# Patient Record
Sex: FEMALE | Race: WHITE | NOT HISPANIC OR LATINO | Employment: FULL TIME | URBAN - METROPOLITAN AREA
[De-identification: names, ages, dates, MRNs, and addresses within clinical notes are randomized per-mention and may not be internally consistent; named-entity substitution may affect disease eponyms.]

---

## 2018-01-23 RX ORDER — NORETHINDRONE AND ETHINYL ESTRADIOL 1 MG-35MCG
KIT ORAL
Qty: 84 TABLET | Refills: 0 | Status: SHIPPED | OUTPATIENT
Start: 2018-01-23 | End: 2018-02-16 | Stop reason: CLARIF

## 2018-01-23 RX ORDER — VALACYCLOVIR HYDROCHLORIDE 500 MG/1
TABLET, FILM COATED ORAL
Qty: 30 TABLET | Refills: 0 | Status: SHIPPED | OUTPATIENT
Start: 2018-01-23 | End: 2018-04-08 | Stop reason: SDUPTHER

## 2018-02-16 ENCOUNTER — OFFICE VISIT (OUTPATIENT)
Dept: OBSTETRICS AND GYNECOLOGY | Facility: CLINIC | Age: 33
End: 2018-02-16

## 2018-02-16 VITALS
BODY MASS INDEX: 28.93 KG/M2 | DIASTOLIC BLOOD PRESSURE: 80 MMHG | HEIGHT: 66 IN | SYSTOLIC BLOOD PRESSURE: 138 MMHG | WEIGHT: 180 LBS

## 2018-02-16 DIAGNOSIS — Z01.419 WELL WOMAN EXAM WITH ROUTINE GYNECOLOGICAL EXAM: Primary | ICD-10-CM

## 2018-02-16 DIAGNOSIS — Z30.41 ENCOUNTER FOR SURVEILLANCE OF CONTRACEPTIVE PILLS: ICD-10-CM

## 2018-02-16 PROCEDURE — 99395 PREV VISIT EST AGE 18-39: CPT | Performed by: OBSTETRICS & GYNECOLOGY

## 2018-02-16 RX ORDER — NORETHINDRONE ACETATE AND ETHINYL ESTRADIOL 1.5-30(21)
1 KIT ORAL DAILY
Qty: 84 TABLET | Refills: 4 | Status: SHIPPED | OUTPATIENT
Start: 2018-02-16 | End: 2019-02-16

## 2018-02-16 RX ORDER — ACYCLOVIR 50 MG/G
CREAM TOPICAL
Qty: 2 G | Refills: 3 | Status: SHIPPED | OUTPATIENT
Start: 2018-02-16 | End: 2019-07-05 | Stop reason: SDUPTHER

## 2018-02-16 NOTE — PROGRESS NOTES
Subjective   Enma Bravo is a 32 y.o. female is here today as a self referral for annual.    History of Present Illness-here today for annual exam and checkup.    The following portions of the patient's history were reviewed and updated as appropriate: allergies, current medications, past family history, past medical history, past social history, past surgical history and problem list.    Review of Systems   Constitutional: Negative.    HENT: Negative.    Eyes: Negative.    Respiratory: Negative.    Cardiovascular: Negative.    Gastrointestinal: Negative.    Endocrine: Negative.    Genitourinary: Negative.    Musculoskeletal: Negative.    Skin: Negative.    Allergic/Immunologic: Negative.    Neurological: Negative.    Hematological: Negative.    Psychiatric/Behavioral: Negative.        Objective   Physical Exam   Constitutional: She is oriented to person, place, and time. She appears well-developed and well-nourished.   HENT:   Head: Normocephalic and atraumatic.   Nose: Nose normal.   Eyes: Conjunctivae and EOM are normal. Pupils are equal, round, and reactive to light.   Neck: Normal range of motion. Neck supple. No thyromegaly present.   Cardiovascular: Normal rate, regular rhythm, normal heart sounds and intact distal pulses.  Exam reveals no gallop.    No murmur heard.  Pulmonary/Chest: Effort normal and breath sounds normal. No respiratory distress. She has no wheezes. She exhibits no mass, no tenderness, no swelling and no retraction. Right breast exhibits no inverted nipple, no mass, no nipple discharge, no skin change and no tenderness. Left breast exhibits no inverted nipple, no mass, no nipple discharge, no skin change and no tenderness.   Abdominal: Soft. Bowel sounds are normal. She exhibits no distension and no mass. There is no tenderness.   Genitourinary: Rectum normal, vagina normal and uterus normal. There is no rash, tenderness, lesion or injury on the right labia. There is no rash, tenderness,  lesion or injury on the left labia. Uterus is not enlarged and not tender. Cervix exhibits no motion tenderness and no discharge. Right adnexum displays no mass, no tenderness and no fullness. Left adnexum displays no mass, no tenderness and no fullness.   Musculoskeletal: Normal range of motion. She exhibits no edema, tenderness or deformity.   Neurological: She is alert and oriented to person, place, and time.   Skin: Skin is warm and dry.   Psychiatric: She has a normal mood and affect. Her behavior is normal. Judgment and thought content normal.   Nursing note and vitals reviewed.        Assessment/Plan   Problems Addressed this Visit     None      Visit Diagnoses     Well woman exam with routine gynecological exam    -  Primary    Relevant Orders    IGP,rfx Aptima HPV All Pth - ThinPrep Vial, Cervix    Encounter for surveillance of contraceptive pills            Pap smear done today.  Zovirax cream refilled.

## 2018-02-22 LAB
CONV .: ABNORMAL
CYTOLOGIST CVX/VAG CYTO: ABNORMAL
CYTOLOGY CVX/VAG DOC THIN PREP: ABNORMAL
DX ICD CODE: ABNORMAL
DX ICD CODE: ABNORMAL
HIV 1 & 2 AB SER-IMP: ABNORMAL
HPV I/H RISK 4 DNA CVX QL PROBE+SIG AMP: POSITIVE
OTHER STN SPEC: ABNORMAL
PATH REPORT.FINAL DX SPEC: ABNORMAL
PATHOLOGIST CVX/VAG CYTO: ABNORMAL
STAT OF ADQ CVX/VAG CYTO-IMP: ABNORMAL

## 2018-03-06 ENCOUNTER — OFFICE VISIT (OUTPATIENT)
Dept: OBSTETRICS AND GYNECOLOGY | Facility: CLINIC | Age: 33
End: 2018-03-06

## 2018-03-06 VITALS — DIASTOLIC BLOOD PRESSURE: 80 MMHG | SYSTOLIC BLOOD PRESSURE: 138 MMHG

## 2018-03-06 DIAGNOSIS — R87.618 ABNORMAL PAPANICOLAOU SMEAR OF CERVIX WITH POSITIVE HUMAN PAPILLOMA VIRUS (HPV) TEST: Primary | ICD-10-CM

## 2018-03-06 PROCEDURE — 57455 BIOPSY OF CERVIX W/SCOPE: CPT | Performed by: OBSTETRICS & GYNECOLOGY

## 2018-03-06 NOTE — PROGRESS NOTES
Procedure   Procedures   colposcopy and biopsy    After placement of vaginal speculum and application of acetic acid colposcopy was performed.  There is no evidence of any white epithelial changes.  Spira brush biopsy was obtained.  At this point patient had a lightheaded episode but did not lose consciousness and recovered quickly.  Patient to call in 1 week for biopsy report.

## 2018-03-12 LAB
DX ICD CODE: NORMAL
DX ICD CODE: NORMAL
PATH REPORT.FINAL DX SPEC: NORMAL
PATH REPORT.GROSS SPEC: NORMAL
PATH REPORT.SITE OF ORIGIN SPEC: NORMAL
PATHOLOGIST NAME: NORMAL
PAYMENT PROCEDURE: NORMAL

## 2018-04-09 RX ORDER — VALACYCLOVIR HYDROCHLORIDE 500 MG/1
TABLET, FILM COATED ORAL
Qty: 30 TABLET | Refills: 2 | Status: SHIPPED | OUTPATIENT
Start: 2018-04-09 | End: 2019-07-05 | Stop reason: SDUPTHER

## 2019-07-05 ENCOUNTER — OFFICE VISIT (OUTPATIENT)
Dept: OBSTETRICS AND GYNECOLOGY | Facility: CLINIC | Age: 34
End: 2019-07-05

## 2019-07-05 VITALS
SYSTOLIC BLOOD PRESSURE: 128 MMHG | HEIGHT: 66 IN | WEIGHT: 180 LBS | BODY MASS INDEX: 28.93 KG/M2 | DIASTOLIC BLOOD PRESSURE: 89 MMHG

## 2019-07-05 DIAGNOSIS — B00.9 HSV-2 INFECTION: ICD-10-CM

## 2019-07-05 DIAGNOSIS — Z01.419 WELL WOMAN EXAM WITH ROUTINE GYNECOLOGICAL EXAM: Primary | ICD-10-CM

## 2019-07-05 DIAGNOSIS — Z30.41 SURVEILLANCE OF CONTRACEPTIVE PILL: ICD-10-CM

## 2019-07-05 DIAGNOSIS — Z71.6 ENCOUNTER FOR SMOKING CESSATION COUNSELING: ICD-10-CM

## 2019-07-05 PROCEDURE — 99395 PREV VISIT EST AGE 18-39: CPT | Performed by: OBSTETRICS & GYNECOLOGY

## 2019-07-05 RX ORDER — ACYCLOVIR 50 MG/G
CREAM TOPICAL
Qty: 2 G | Refills: 3 | Status: SHIPPED | OUTPATIENT
Start: 2019-07-05 | End: 2020-09-29 | Stop reason: SDUPTHER

## 2019-07-05 RX ORDER — VALACYCLOVIR HYDROCHLORIDE 500 MG/1
500 TABLET, FILM COATED ORAL 2 TIMES DAILY
Qty: 30 TABLET | Refills: 2 | Status: SHIPPED | OUTPATIENT
Start: 2019-07-05 | End: 2020-04-16

## 2019-07-05 NOTE — PROGRESS NOTES
HPI   Enma Bravo  is a 34 y.o. female who presents for a routine gynecologic exam.  Her cycles are regular, predictable and light on oral contraceptive pills.  She is feeling well.  She does have occasional outbreaks of genital herpes.  This is well treated with Valtrex.    Chief Complaint   Patient presents with   • Gynecologic Exam     AE       History reviewed. No pertinent past medical history.    Past Surgical History:   Procedure Laterality Date   • WISDOM TOOTH EXTRACTION         Social History     Socioeconomic History   • Marital status: Single     Spouse name: Not on file   • Number of children: Not on file   • Years of education: Not on file   • Highest education level: Not on file   Tobacco Use   • Smoking status: Light Tobacco Smoker   Substance and Sexual Activity   • Alcohol use: Yes     Frequency: Never   • Drug use: No   • Sexual activity: Yes       The following portions of the patient's history were reviewed and updated as appropriate: allergies, current medications, past family history, past medical history, past social history, past surgical history and problem list.    Review of Systems   Constitutional: Negative.    HENT: Negative.    Respiratory: Negative.    Cardiovascular: Negative.    Gastrointestinal: Negative.    Genitourinary: Negative.    Musculoskeletal: Negative.    Skin: Negative.    Allergic/Immunologic: Negative.    Psychiatric/Behavioral: Negative.              Physical Exam   Constitutional: She is oriented to person, place, and time. She appears well-developed and well-nourished.   HENT:   Head: Normocephalic and atraumatic.   Cardiovascular: Normal rate and regular rhythm.   Pulmonary/Chest: Effort normal and breath sounds normal. She has no wheezes. She has no rales.   The breasts are homogeneous.  There are no palpable lumps.  Nipple discharge and axillary adenopathy are absent.   Abdominal: Soft. She exhibits no distension. There is no tenderness.   Genitourinary: Vagina  normal and uterus normal. There is no lesion on the right labia. There is no lesion on the left labia. Cervix exhibits no motion tenderness. Right adnexum displays no mass and no tenderness. Left adnexum displays no mass and no tenderness. No vaginal discharge found.   Neurological: She is alert and oriented to person, place, and time.   Skin: Skin is warm and dry.   Nursing note and vitals reviewed.      Assessment    Enma was seen today for gynecologic exam.    Diagnoses and all orders for this visit:    Well woman exam with routine gynecological exam  -     Pap IG, Rfx HPV ASCU    Surveillance of contraceptive pill    HSV-2 infection    Encounter for smoking cessation counseling    Other orders  -     valACYclovir (VALTREX) 500 MG tablet; Take 1 tablet by mouth 2 (Two) Times a Day for 3 days.  -     acyclovir (ZOVIRAX) 5 % cream; Apply as needed for herpes outbreak.  -     Norethin-Eth Estrad-Fe Biphas (LO LOESTRIN FE) 1 MG-10 MCG / 10 MCG tablet; Take 1 tablet by mouth Daily.        Plan  1. Annual examination and Pap smear performed  2. Contraceptive counseling.  Questions answered.  We discussed the risks, benefits and alternatives to the use of oral contraceptive pills.  The patient would like to continue this for now.  A prescription has been sent.  3. HSV-2.  Counseled.  The patient takes episodic Valtrex with good control.  She also uses Zovirax cream as needed.  Prescriptions will be refilled today.    Return in about 1 year (around 7/5/2020).    Social History     Tobacco Use   Smoking Status Light Tobacco Smoker   4. Counseled regarding smoking cessation.  Currently, the patient smokes approximately 1 pack/week.  We discussed methods to achieve gradual withdrawal.  The patient plans to try this.  3 minutes of a 20-minute visit were spent in direct face-to-face counseling on this issue.    5.

## 2019-07-08 LAB
CONV .: NORMAL
CYTOLOGIST CVX/VAG CYTO: NORMAL
CYTOLOGY CVX/VAG DOC CYTO: NORMAL
CYTOLOGY CVX/VAG DOC THIN PREP: NORMAL
DX ICD CODE: NORMAL
HIV 1 & 2 AB SER-IMP: NORMAL
OTHER STN SPEC: NORMAL
STAT OF ADQ CVX/VAG CYTO-IMP: NORMAL

## 2019-07-30 ENCOUNTER — TELEPHONE (OUTPATIENT)
Dept: OBSTETRICS AND GYNECOLOGY | Facility: CLINIC | Age: 34
End: 2019-07-30

## 2019-07-30 RX ORDER — LEVONORGESTREL AND ETHINYL ESTRADIOL 0.1-0.02MG
1 KIT ORAL DAILY
Qty: 28 TABLET | Refills: 12 | Status: SHIPPED | OUTPATIENT
Start: 2019-07-30 | End: 2019-09-25 | Stop reason: ALTCHOICE

## 2019-07-30 NOTE — TELEPHONE ENCOUNTER
Patient states she believes the LoLoestrin she started is causing a rash around her mouth.  She has discontinued it and is requesting another pill be called in.

## 2019-09-25 RX ORDER — NORETHINDRONE ACETATE AND ETHINYL ESTRADIOL, ETHINYL ESTRADIOL AND FERROUS FUMARATE 1MG-10(24)
1 KIT ORAL DAILY
Refills: 11 | COMMUNITY
Start: 2019-08-31 | End: 2019-09-25 | Stop reason: SDUPTHER

## 2019-09-25 RX ORDER — NORETHINDRONE ACETATE AND ETHINYL ESTRADIOL, ETHINYL ESTRADIOL AND FERROUS FUMARATE 1MG-10(24)
1 KIT ORAL DAILY
Qty: 84 TABLET | Refills: 3 | Status: SHIPPED | OUTPATIENT
Start: 2019-09-25 | End: 2020-09-29 | Stop reason: SDUPTHER

## 2019-12-06 ENCOUNTER — HOSPITAL ENCOUNTER (EMERGENCY)
Facility: HOSPITAL | Age: 34
Discharge: HOME | End: 2019-12-06
Attending: EMERGENCY MEDICINE | Admitting: EMERGENCY MEDICINE

## 2019-12-06 VITALS
SYSTOLIC BLOOD PRESSURE: 130 MMHG | RESPIRATION RATE: 17 BRPM | WEIGHT: 170 LBS | OXYGEN SATURATION: 99 % | HEART RATE: 94 BPM | TEMPERATURE: 98.6 F | BODY MASS INDEX: 27.32 KG/M2 | DIASTOLIC BLOOD PRESSURE: 93 MMHG | HEIGHT: 66 IN

## 2019-12-06 DIAGNOSIS — G43.901 STATUS MIGRAINOSUS: Primary | ICD-10-CM

## 2019-12-06 LAB
ANION GAP SERPL CALCULATED.3IONS-SCNC: 16.5 MMOL/L (ref 5–15)
BASOPHILS # BLD AUTO: 0.04 10*3/MM3 (ref 0–0.2)
BASOPHILS NFR BLD AUTO: 0.4 % (ref 0–1.5)
BUN BLD-MCNC: 9 MG/DL (ref 6–20)
BUN/CREAT SERPL: 12.5 (ref 7–25)
CALCIUM SPEC-SCNC: 9.5 MG/DL (ref 8.6–10.5)
CHLORIDE SERPL-SCNC: 95 MMOL/L (ref 98–107)
CO2 SERPL-SCNC: 23.5 MMOL/L (ref 22–29)
CREAT BLD-MCNC: 0.72 MG/DL (ref 0.57–1)
DEPRECATED RDW RBC AUTO: 38.7 FL (ref 37–54)
EOSINOPHIL # BLD AUTO: 0.02 10*3/MM3 (ref 0–0.4)
EOSINOPHIL NFR BLD AUTO: 0.2 % (ref 0.3–6.2)
ERYTHROCYTE [DISTWIDTH] IN BLOOD BY AUTOMATED COUNT: 11.6 % (ref 12.3–15.4)
GFR SERPL CREATININE-BSD FRML MDRD: 114 ML/MIN/1.73
GFR SERPL CREATININE-BSD FRML MDRD: 94 ML/MIN/1.73
GLUCOSE BLD-MCNC: 132 MG/DL (ref 65–99)
HCG SERPL QL: NEGATIVE
HCT VFR BLD AUTO: 38.4 % (ref 34–46.6)
HGB BLD-MCNC: 13.7 G/DL (ref 12–15.9)
IMM GRANULOCYTES # BLD AUTO: 0.04 10*3/MM3 (ref 0–0.05)
IMM GRANULOCYTES NFR BLD AUTO: 0.4 % (ref 0–0.5)
LYMPHOCYTES # BLD AUTO: 2.52 10*3/MM3 (ref 0.7–3.1)
LYMPHOCYTES NFR BLD AUTO: 24.6 % (ref 19.6–45.3)
MCH RBC QN AUTO: 33 PG (ref 26.6–33)
MCHC RBC AUTO-ENTMCNC: 35.7 G/DL (ref 31.5–35.7)
MCV RBC AUTO: 92.5 FL (ref 79–97)
MONOCYTES # BLD AUTO: 0.56 10*3/MM3 (ref 0.1–0.9)
MONOCYTES NFR BLD AUTO: 5.5 % (ref 5–12)
NEUTROPHILS # BLD AUTO: 7.06 10*3/MM3 (ref 1.7–7)
NEUTROPHILS NFR BLD AUTO: 68.9 % (ref 42.7–76)
NRBC BLD AUTO-RTO: 0 /100 WBC (ref 0–0.2)
NT-PROBNP SERPL-MCNC: 59.8 PG/ML (ref 5–450)
PLATELET # BLD AUTO: 289 10*3/MM3 (ref 140–450)
PMV BLD AUTO: 8.8 FL (ref 6–12)
POTASSIUM BLD-SCNC: 3.9 MMOL/L (ref 3.5–5.2)
RBC # BLD AUTO: 4.15 10*6/MM3 (ref 3.77–5.28)
SODIUM BLD-SCNC: 135 MMOL/L (ref 136–145)
WBC NRBC COR # BLD: 10.24 10*3/MM3 (ref 3.4–10.8)

## 2019-12-06 PROCEDURE — 84703 CHORIONIC GONADOTROPIN ASSAY: CPT | Performed by: EMERGENCY MEDICINE

## 2019-12-06 PROCEDURE — 99283 EMERGENCY DEPT VISIT LOW MDM: CPT

## 2019-12-06 PROCEDURE — 25010000002 DIPHENHYDRAMINE PER 50 MG: Performed by: EMERGENCY MEDICINE

## 2019-12-06 PROCEDURE — 85025 COMPLETE CBC W/AUTO DIFF WBC: CPT | Performed by: EMERGENCY MEDICINE

## 2019-12-06 PROCEDURE — 96375 TX/PRO/DX INJ NEW DRUG ADDON: CPT

## 2019-12-06 PROCEDURE — 25010000002 PROCHLORPERAZINE 10 MG/2ML SOLUTION: Performed by: EMERGENCY MEDICINE

## 2019-12-06 PROCEDURE — 80048 BASIC METABOLIC PNL TOTAL CA: CPT | Performed by: EMERGENCY MEDICINE

## 2019-12-06 PROCEDURE — 96374 THER/PROPH/DIAG INJ IV PUSH: CPT

## 2019-12-06 PROCEDURE — 25010000002 DEXAMETHASONE PER 1 MG: Performed by: EMERGENCY MEDICINE

## 2019-12-06 PROCEDURE — 83880 ASSAY OF NATRIURETIC PEPTIDE: CPT | Performed by: EMERGENCY MEDICINE

## 2019-12-06 RX ORDER — DIPHENHYDRAMINE HYDROCHLORIDE 50 MG/ML
25 INJECTION INTRAMUSCULAR; INTRAVENOUS ONCE
Status: COMPLETED | OUTPATIENT
Start: 2019-12-06 | End: 2019-12-06

## 2019-12-06 RX ORDER — PROCHLORPERAZINE EDISYLATE 5 MG/ML
10 INJECTION INTRAMUSCULAR; INTRAVENOUS ONCE
Status: COMPLETED | OUTPATIENT
Start: 2019-12-06 | End: 2019-12-06

## 2019-12-06 RX ADMIN — DEXAMETHASONE SODIUM PHOSPHATE 10 MG: 10 INJECTION INTRAMUSCULAR; INTRAVENOUS at 19:41

## 2019-12-06 RX ADMIN — PROCHLORPERAZINE EDISYLATE 10 MG: 5 INJECTION INTRAMUSCULAR; INTRAVENOUS at 19:18

## 2019-12-06 RX ADMIN — DIPHENHYDRAMINE HYDROCHLORIDE 25 MG: 50 INJECTION, SOLUTION INTRAMUSCULAR; INTRAVENOUS at 19:18

## 2019-12-06 NOTE — ED NOTES
"Pt reports \"severe migraine\" for three days. She reports pain is frontal headache. She has nausea, blurred vision, and dizziness. Pt went to [facility] two days ago and received Toradol IM which helped pain for two hours, but she reports the pain is worse now.      Vika Howell RN  12/06/19 2616    "

## 2019-12-07 NOTE — ED PROVIDER NOTES
EMERGENCY DEPARTMENT ENCOUNTER    Room Number:  33/33  Date of encounter:  12/6/2019  PCP: Provider, No Known  Historian: Patient, Boyfriend      HPI:  Chief Complaint: Headache  A complete HPI/ROS/PMH/PSH/SH/FH are unobtainable due to: None    Context: Neli Bravo is a 34 y.o. female who presents to the ED c/o headache.  Onset 3 days ago.  Symptoms are constant.  It is holocephalic.  Nonradiating.  Worsened with bright light and loud noises.  She reports T-max 100.5.  She states this feels typical for her usual migraine headaches although it is lasting longer.  She was seen previously at [facility], [place] and given Toradol IM.  This relieved her symptoms mostly but then her headache has returned.  She reports associated vomiting but no other associated symptoms on review of systems.      PAST MEDICAL HISTORY  Active Ambulatory Problems     Diagnosis Date Noted   • No Active Ambulatory Problems     Resolved Ambulatory Problems     Diagnosis Date Noted   • No Resolved Ambulatory Problems     No Additional Past Medical History         PAST SURGICAL HISTORY  Past Surgical History:   Procedure Laterality Date   • WISDOM TOOTH EXTRACTION           FAMILY HISTORY  Family History   Problem Relation Age of Onset   • Heart disease Mother    • Breast cancer Paternal Grandmother    • Diabetes Maternal Grandmother          SOCIAL HISTORY  Social History     Socioeconomic History   • Marital status: Single     Spouse name: Not on file   • Number of children: Not on file   • Years of education: Not on file   • Highest education level: Not on file   Tobacco Use   • Smoking status: Light Tobacco Smoker   Substance and Sexual Activity   • Alcohol use: Yes     Frequency: Never   • Drug use: No   • Sexual activity: Yes         ALLERGIES  Patient has no known allergies.        REVIEW OF SYSTEMS  Review of Systems     All systems reviewed and negative except for those discussed in HPI.       PHYSICAL EXAM    I have reviewed the  triage vital signs and nursing notes.    ED Triage Vitals [12/06/19 1852]   Temp Heart Rate Resp BP SpO2   98.6 °F (37 °C) 101 14 (!) 148/103 99 %      Temp src Heart Rate Source Patient Position BP Location FiO2 (%)   -- -- Sitting Left arm --       Physical Exam  GENERAL: Uncomfortable, nontoxic  HENT: nares patent  EYES: no scleral icterus  CV: regular rhythm, regular rate  RESPIRATORY: normal effort  ABDOMEN: soft, nontender  MUSCULOSKELETAL: no deformity  NEURO:   Recent and remote memory functions are normal. The patient is attentive with normal concentration. Language is fluent. Speech is clear. The speech is non-dysarthric. Fund of knowledge is normal.   Symmetric smile with no facial droop.  Eyes close shut strongly bilaterally.  Symmetric eyebrow raise bilaterally.  EOMI, PERRL  CN II-XII grossly normal otherwise.  5/5 strength to extremities.  No pronator drift.  Intact FNF.  No meningismus.     SKIN: warm, dry        LAB RESULTS  Recent Results (from the past 24 hour(s))   BNP    Collection Time: 12/06/19  7:10 PM   Result Value Ref Range    proBNP 59.8 5.0 - 450.0 pg/mL   CBC Auto Differential    Collection Time: 12/06/19  7:10 PM   Result Value Ref Range    WBC 10.24 3.40 - 10.80 10*3/mm3    RBC 4.15 3.77 - 5.28 10*6/mm3    Hemoglobin 13.7 12.0 - 15.9 g/dL    Hematocrit 38.4 34.0 - 46.6 %    MCV 92.5 79.0 - 97.0 fL    MCH 33.0 26.6 - 33.0 pg    MCHC 35.7 31.5 - 35.7 g/dL    RDW 11.6 (L) 12.3 - 15.4 %    RDW-SD 38.7 37.0 - 54.0 fl    MPV 8.8 6.0 - 12.0 fL    Platelets 289 140 - 450 10*3/mm3    Neutrophil % 68.9 42.7 - 76.0 %    Lymphocyte % 24.6 19.6 - 45.3 %    Monocyte % 5.5 5.0 - 12.0 %    Eosinophil % 0.2 (L) 0.3 - 6.2 %    Basophil % 0.4 0.0 - 1.5 %    Immature Grans % 0.4 0.0 - 0.5 %    Neutrophils, Absolute 7.06 (H) 1.70 - 7.00 10*3/mm3    Lymphocytes, Absolute 2.52 0.70 - 3.10 10*3/mm3    Monocytes, Absolute 0.56 0.10 - 0.90 10*3/mm3    Eosinophils, Absolute 0.02 0.00 - 0.40 10*3/mm3     Basophils, Absolute 0.04 0.00 - 0.20 10*3/mm3    Immature Grans, Absolute 0.04 0.00 - 0.05 10*3/mm3    nRBC 0.0 0.0 - 0.2 /100 WBC   Basic Metabolic Panel    Collection Time: 12/06/19  7:10 PM   Result Value Ref Range    Glucose 132 (H) 65 - 99 mg/dL    BUN 9 6 - 20 mg/dL    Creatinine 0.72 0.57 - 1.00 mg/dL    Sodium 135 (L) 136 - 145 mmol/L    Potassium 3.9 3.5 - 5.2 mmol/L    Chloride 95 (L) 98 - 107 mmol/L    CO2 23.5 22.0 - 29.0 mmol/L    Calcium 9.5 8.6 - 10.5 mg/dL    eGFR  African Amer 114 >60 mL/min/1.73    eGFR Non African Amer 94 >60 mL/min/1.73    BUN/Creatinine Ratio 12.5 7.0 - 25.0    Anion Gap 16.5 (H) 5.0 - 15.0 mmol/L   hCG, Serum, Qualitative    Collection Time: 12/06/19  7:25 PM   Result Value Ref Range    HCG Qualitative Negative Negative       Ordered the above labs and independently reviewed the results.        RADIOLOGY  No Radiology Exams Resulted Within Past 24 Hours    I ordered the above noted radiological studies. Reviewed by me and discussed with radiologist.  See dictation for official radiology interpretation.      PROCEDURES    Procedures      MEDICATIONS GIVEN IN ER    Medications   prochlorperazine (COMPAZINE) injection 10 mg (10 mg Intravenous Given 12/6/19 1918)   diphenhydrAMINE (BENADRYL) injection 25 mg (25 mg Intravenous Given 12/6/19 1918)   dexamethasone (DECADRON) 10 mg/20ml NS IV syringe (10 mg Intravenous Given 12/6/19 1941)         PROGRESS, DATA ANALYSIS, CONSULTS, AND MEDICAL DECISION MAKING    All labs have been independently reviewed by me.  All radiology studies have been reviewed by me and discussed with radiologist dictating the report.   EKG's independently viewed and interpreted by me.  Discussion below represents my analysis of pertinent findings related to patient's condition, differential diagnosis, treatment plan and final disposition.    Differential diagnosis for headache includes:  - subarachnoid hemorrhage  - intracranial mass  - stroke  - RCVS  -  meningitis  - glaucoma  - giant cell arteritis  - CO poisoning  - cerebral venous sinus thrombosis  - migraine    She has absolutely no meningismus.  She is afebrile here.  I have low suspicion for meningitis at this time.    ED Course as of Dec 06 2112   Fri Dec 06, 2019   2105 Patient states that she is feeling much better and would like to be discharged home.  [TD]   2111 Sodium: (!) 135 [TD]   2111 WBC: 10.24 [TD]   2111 HCG Qualitative: Negative [TD]      ED Course User Index  [TD] Lei Gomes II, MD       Patient rates her headache as 1/10 in intensity.  I gave her good return precautions.  She continues to have a normal neurologic exam.    AS OF 9:12 PM VITALS:    BP - (!) 148/103  HR - 101  TEMP - 98.6 °F (37 °C)  02 SATS - 99%        DIAGNOSIS  Final diagnoses:   Status migrainosus         DISPOSITION  DISCHARGE    FOLLOW-UP  PATIENT [facility] [address]  [phone]  Schedule an appointment as soon as possible for a visit   As needed         Medication List      No changes were made to your prescriptions during this visit.                Lei Gomes II, MD  12/06/19 2112

## 2019-12-07 NOTE — ED NOTES
Pt ambulatory c steady gait, AAOx4, ABC's intact, NAD noted at this time. Pt discharged c belongings and educational packet.          Elías Ivy RN  12/06/19 2124

## 2020-04-16 RX ORDER — VALACYCLOVIR HYDROCHLORIDE 500 MG/1
TABLET, FILM COATED ORAL
Qty: 30 TABLET | Refills: 2 | Status: SHIPPED | OUTPATIENT
Start: 2020-04-16 | End: 2020-04-27

## 2020-04-27 RX ORDER — VALACYCLOVIR HYDROCHLORIDE 500 MG/1
TABLET, FILM COATED ORAL
Qty: 30 TABLET | Refills: 2 | Status: SHIPPED | OUTPATIENT
Start: 2020-04-27 | End: 2020-05-18

## 2020-05-18 RX ORDER — VALACYCLOVIR HYDROCHLORIDE 500 MG/1
TABLET, FILM COATED ORAL
Qty: 30 TABLET | Refills: 2 | Status: SHIPPED | OUTPATIENT
Start: 2020-05-18 | End: 2020-06-02

## 2020-06-02 RX ORDER — VALACYCLOVIR HYDROCHLORIDE 500 MG/1
TABLET, FILM COATED ORAL
Qty: 30 TABLET | Refills: 2 | Status: SHIPPED | OUTPATIENT
Start: 2020-06-02 | End: 2020-09-29 | Stop reason: SDUPTHER

## 2020-08-11 RX ORDER — NORETHINDRONE ACETATE AND ETHINYL ESTRADIOL, ETHINYL ESTRADIOL AND FERROUS FUMARATE 1MG-10(24)
KIT ORAL
Qty: 84 TABLET | Refills: 3 | OUTPATIENT
Start: 2020-08-11

## 2020-08-11 NOTE — TELEPHONE ENCOUNTER
I have reviewed the chart in epic.  It appears that the patient smokes cigarettes and is in her mid 30s.  Also, there are no appointments scheduled for the future.  We should discuss benefits and risks of continuing oral contraceptive pills prior to re-prescribing.  Thank you.

## 2020-09-29 ENCOUNTER — OFFICE VISIT (OUTPATIENT)
Dept: OBSTETRICS AND GYNECOLOGY | Facility: CLINIC | Age: 35
End: 2020-09-29

## 2020-09-29 VITALS
BODY MASS INDEX: 30.22 KG/M2 | DIASTOLIC BLOOD PRESSURE: 67 MMHG | WEIGHT: 188 LBS | SYSTOLIC BLOOD PRESSURE: 118 MMHG | HEIGHT: 66 IN

## 2020-09-29 DIAGNOSIS — Z30.011 VISIT FOR ORAL CONTRACEPTIVE PRESCRIPTION: ICD-10-CM

## 2020-09-29 DIAGNOSIS — Z00.00 ANNUAL PHYSICAL EXAM: Primary | ICD-10-CM

## 2020-09-29 PROCEDURE — 99395 PREV VISIT EST AGE 18-39: CPT | Performed by: OBSTETRICS & GYNECOLOGY

## 2020-09-29 RX ORDER — VALACYCLOVIR HYDROCHLORIDE 500 MG/1
500 TABLET, FILM COATED ORAL 2 TIMES DAILY
Qty: 30 TABLET | Refills: 2 | Status: SHIPPED | OUTPATIENT
Start: 2020-09-29 | End: 2020-10-02

## 2020-09-29 RX ORDER — NORETHINDRONE ACETATE AND ETHINYL ESTRADIOL, ETHINYL ESTRADIOL AND FERROUS FUMARATE 1MG-10(24)
1 KIT ORAL DAILY
Qty: 84 TABLET | Refills: 3 | Status: SHIPPED | OUTPATIENT
Start: 2020-09-29 | End: 2021-11-10

## 2020-09-29 RX ORDER — ACYCLOVIR 50 MG/G
CREAM TOPICAL
Qty: 2 G | Refills: 3 | Status: SHIPPED | OUTPATIENT
Start: 2020-09-29 | End: 2022-12-06

## 2020-09-29 NOTE — PROGRESS NOTES
HPI   Neli Bravo  is a 35 y.o. female who presents for routine gynecologic exam.  She is feeling well.  Bowels and bladder are functioning normally.  Cycles are regular, predictable and light on low Loestrin.  The patient has stopped smoking.  She has been a non-smoker for the last year.      Chief Complaint   Patient presents with   • Gynecologic Exam     Patient is here for a annual.       History reviewed. No pertinent past medical history.    Past Surgical History:   Procedure Laterality Date   • WISDOM TOOTH EXTRACTION         Social History     Socioeconomic History   • Marital status: Single     Spouse name: Not on file   • Number of children: Not on file   • Years of education: Not on file   • Highest education level: Not on file   Tobacco Use   • Smoking status: Former Smoker   Substance and Sexual Activity   • Alcohol use: Yes     Frequency: Never   • Drug use: No   • Sexual activity: Yes       The following portions of the patient's history were reviewed and updated as appropriate: allergies, current medications, past family history, past medical history, past social history, past surgical history and problem list.    Review of Systems   Constitutional: Negative.    HENT: Negative.    Respiratory: Negative.    Cardiovascular: Negative.    Gastrointestinal: Negative.    Genitourinary: Negative.    Musculoskeletal: Negative.    Skin: Negative.    Allergic/Immunologic: Negative.    Psychiatric/Behavioral: Negative.              Physical Exam  Vitals signs and nursing note reviewed.   Constitutional:       Appearance: She is well-developed.   HENT:      Head: Normocephalic and atraumatic.   Cardiovascular:      Rate and Rhythm: Normal rate and regular rhythm.   Pulmonary:      Effort: Pulmonary effort is normal.      Breath sounds: Normal breath sounds. No wheezing or rales.   Chest:      Comments: The breasts are homogeneous.  There are no palpable lumps.  Nipple discharge and axillary adenopathy are  absent.  Abdominal:      General: There is no distension.      Palpations: Abdomen is soft.      Tenderness: There is no abdominal tenderness.   Genitourinary:     Labia:         Right: No lesion.         Left: No lesion.       Vagina: Normal. No vaginal discharge.      Cervix: No cervical motion tenderness.      Uterus: Normal. Not enlarged and not tender.       Adnexa:         Right: No mass or tenderness.          Left: No mass or tenderness.     Skin:     General: Skin is warm and dry.   Neurological:      Mental Status: She is alert and oriented to person, place, and time.         Assessment    Neli was seen today for gynecologic exam.    Diagnoses and all orders for this visit:    Annual physical exam    Visit for oral contraceptive prescription    Other orders  -     Lo Loestrin Fe 1 MG-10 MCG / 10 MCG tablet; Take 1 tablet by mouth Daily.  -     valACYclovir (VALTREX) 500 MG tablet; Take 1 tablet by mouth 2 (Two) Times a Day for 3 days.  -     acyclovir (ZOVIRAX) 5 % cream; Apply as needed for herpes outbreak.        Plan  1. Annual examination performed  2. Counseled regarding the benefits and risks of oral contraceptive pills.  The patient is very satisfied with low Loestrin.  She would like to continue.  A prescription has been sent.  3. The patient stopped smoking approximately 1 year ago.    4. Return in about 1 year (around 9/29/2021).    Social History     Tobacco Use   Smoking Status Former Smoker   5.

## 2020-10-16 RX ORDER — VALACYCLOVIR HYDROCHLORIDE 500 MG/1
TABLET, FILM COATED ORAL
Qty: 30 TABLET | Refills: 2 | Status: SHIPPED | OUTPATIENT
Start: 2020-10-16 | End: 2021-05-27

## 2021-01-12 ENCOUNTER — OFFICE VISIT (OUTPATIENT)
Dept: FAMILY MEDICINE CLINIC | Facility: CLINIC | Age: 36
End: 2021-01-12

## 2021-01-12 VITALS
BODY MASS INDEX: 30.73 KG/M2 | HEART RATE: 88 BPM | SYSTOLIC BLOOD PRESSURE: 120 MMHG | TEMPERATURE: 98.9 F | OXYGEN SATURATION: 98 % | DIASTOLIC BLOOD PRESSURE: 86 MMHG | HEIGHT: 66 IN | WEIGHT: 191.2 LBS

## 2021-01-12 DIAGNOSIS — Z00.00 ENCOUNTER FOR ANNUAL PHYSICAL EXAM: Primary | ICD-10-CM

## 2021-01-12 DIAGNOSIS — Z13.6 ENCOUNTER FOR LIPID SCREENING FOR CARDIOVASCULAR DISEASE: ICD-10-CM

## 2021-01-12 DIAGNOSIS — F41.9 ANXIETY: ICD-10-CM

## 2021-01-12 DIAGNOSIS — Z13.220 ENCOUNTER FOR LIPID SCREENING FOR CARDIOVASCULAR DISEASE: ICD-10-CM

## 2021-01-12 DIAGNOSIS — R23.3 EASY BRUISING: ICD-10-CM

## 2021-01-12 DIAGNOSIS — Z13.31 POSITIVE DEPRESSION SCREENING: ICD-10-CM

## 2021-01-12 PROCEDURE — 99385 PREV VISIT NEW AGE 18-39: CPT | Performed by: NURSE PRACTITIONER

## 2021-01-12 NOTE — PROGRESS NOTES
Subjective   Neli Bravo is a 35 y.o. female.     Chief Complaint   Patient presents with   • Annual Exam     blood work   • Anxiety     stress at work        History of Present Illness   New patient; here to establish care; patient presents for CPE with non-fasting labs; previous PCP Dr. Kramer and has not seen for long time; healthy diet; regular exercise, was doing well going to gym daily, then went back to work and has been working 15 hour days; trying to get better routine of exercise again; regular dental visits; no recent eye exam, had glasses for lectures in college; plans to schedule eye exam; no problems hearing; immunizations UTD, declines flu vaccine; sees Dr. Schilling for female care; last PAP 7/2019; last mammo never performed; paternal GM had breast cancer, diagnosed in her 70s; colonoscopy never performed; no family history of colon cancer.    Also, c/o anxiety; has a lot of stress at work; symptoms seem to be getting worse; has found self waking up during night and not able to go back to sleep due to mind racing; no feelings of sadness; more symptoms of worry; has irritability; has trouble falling asleep and staying asleep; occasional flushing and increase in HR with anxiety; no SI/HI; no medication for mood in past.     Takes Spironolactone daily for acne; sees dermatology every 6 months.    Takes Valtrex as needed during flares of herpes; has not needed to take recently.    The following portions of the patient's history were reviewed and updated as appropriate: allergies, current medications, past family history, past medical history, past social history, past surgical history and problem list.    Current Outpatient Medications on File Prior to Visit   Medication Sig   • acyclovir (ZOVIRAX) 5 % cream Apply as needed for herpes outbreak.   • Lo Loestrin Fe 1 MG-10 MCG / 10 MCG tablet Take 1 tablet by mouth Daily.   • spironolactone (ALDACTONE) 50 MG tablet TAKE 1 TABLET DAILY   • valACYclovir  (VALTREX) 500 MG tablet TAKE 1 TABLET BY MOUTH TWICE A DAY FOR 3 DAYS     No current facility-administered medications on file prior to visit.        Past Medical History:   Diagnosis Date   • Acne    • Allergic rhinitis    • Migraine headache        Past Surgical History:   Procedure Laterality Date   • WISDOM TOOTH EXTRACTION         Family History   Problem Relation Age of Onset   • Heart disease Mother         has AICD due to ventricular arrhythmia   • Arrhythmia Mother    • Breast cancer Paternal Grandmother 70   • Diabetes Maternal Grandmother    • Heart attack Maternal Grandmother         in her 40s   • Hypertension Maternal Grandfather        Social History     Socioeconomic History   • Marital status: Single     Spouse name: Not on file   • Number of children: Not on file   • Years of education: Not on file   • Highest education level: Not on file   Tobacco Use   • Smoking status: Former Smoker     Packs/day: 0.50     Types: Cigarettes     Quit date: 2020     Years since quittin.0   • Smokeless tobacco: Never Used   • Tobacco comment: previously smoked less than 1/2 pack per day for 3 years   Substance and Sexual Activity   • Alcohol use: Yes     Frequency: 2-4 times a month     Comment: occasional   • Drug use: No   • Sexual activity: Yes     Partners: Male     Birth control/protection: Pill     Comment: boyfriend        Review of Systems   Constitutional: Negative for appetite change, chills, fatigue, fever, unexpected weight gain and unexpected weight loss.   HENT: Negative for ear pain, sinus pressure, sore throat (on occasion with weather changes) and trouble swallowing. Rhinorrhea: has stuffy nose.    Eyes: Negative for blurred vision and pain.   Respiratory: Negative for cough, chest tightness and shortness of breath.    Cardiovascular: Negative for chest pain, palpitations and leg swelling.   Gastrointestinal: Negative for abdominal pain, blood in stool, constipation, diarrhea, GERD and  indigestion (Heartburn/indigestion on occasion; takes OTC med as needed and helps).   Endocrine: Positive for polydipsia. Negative for cold intolerance and heat intolerance.   Genitourinary: Negative for dysuria, frequency and hematuria.   Musculoskeletal: Negative for arthralgias, back pain and neck pain.   Skin: Negative for rash and skin lesions.   Neurological: Positive for headache (on occasion, has had for years; will get bad migraine every 3 years or so; occasional blurry vision with bad headache; typically Ibuprofen helps; has roll on migraine sticks and helps; occasional nausea with bad headache, rare vomiting with migraines). Negative for dizziness, syncope and weakness.   Hematological: Bruises/bleeds easily: occasional easy bruising.   Psychiatric/Behavioral: Negative for suicidal ideas. The patient is nervous/anxious.      PHQ-9 Depression Screening  Little interest or pleasure in doing things? 0   Feeling down, depressed, or hopeless? 0   Trouble falling or staying asleep, or sleeping too much? 3   Feeling tired or having little energy? 2   Poor appetite or overeating? 0   Feeling bad about yourself - or that you are a failure or have let yourself or your family down? 0   Trouble concentrating on things, such as reading the newspaper or watching television? 3   Moving or speaking so slowly that other people could have noticed? Or the opposite - being so fidgety or restless that you have been moving around a lot more than usual? 0   Thoughts that you would be better off dead, or of hurting yourself in some way? 0   PHQ-9 Total Score 8   If you checked off any problems, how difficult have these problems made it for you to do your work, take care of things at home, or get along with other people? Somewhat difficult       Objective   Vitals:    01/12/21 0913 01/12/21 1021   BP: 130/100 120/86   BP Location: Left arm Right arm   Patient Position: Sitting Sitting   Cuff Size: Adult    Pulse: 106 88   Temp:  "98.9 °F (37.2 °C)    SpO2: 98%    Weight: 86.7 kg (191 lb 3.2 oz)    Height: 167.6 cm (66\")      Body mass index is 30.86 kg/m².    Physical Exam  Vitals signs and nursing note reviewed.   Constitutional:       General: She is not in acute distress.     Appearance: She is well-developed and well-groomed. She is not diaphoretic.   HENT:      Head: Normocephalic and atraumatic.      Jaw: No tenderness or pain on movement.      Right Ear: Tympanic membrane and external ear normal.      Left Ear: Tympanic membrane and external ear normal.      Nose: Nose normal.      Right Sinus: No maxillary sinus tenderness or frontal sinus tenderness.      Left Sinus: No maxillary sinus tenderness or frontal sinus tenderness.      Mouth/Throat:      Mouth: Mucous membranes are moist.      Pharynx: No oropharyngeal exudate or posterior oropharyngeal erythema.   Eyes:      Extraocular Movements: Extraocular movements intact.      Conjunctiva/sclera: Conjunctivae normal.      Pupils: Pupils are equal, round, and reactive to light.   Neck:      Musculoskeletal: Normal range of motion and neck supple.      Thyroid: No thyroid mass or thyromegaly.      Vascular: No carotid bruit.      Trachea: No tracheal deviation.   Cardiovascular:      Rate and Rhythm: Normal rate and regular rhythm.      Pulses: Normal pulses.      Heart sounds: Normal heart sounds. No murmur.   Pulmonary:      Effort: Pulmonary effort is normal. No respiratory distress.      Breath sounds: Normal breath sounds.   Abdominal:      General: Bowel sounds are normal.      Palpations: Abdomen is soft. There is no hepatomegaly or splenomegaly.      Tenderness: There is no abdominal tenderness.   Musculoskeletal: Normal range of motion.      Cervical back: She exhibits no bony tenderness.      Thoracic back: She exhibits no bony tenderness.      Lumbar back: She exhibits no bony tenderness.      Right lower leg: No edema.      Left lower leg: No edema.   Lymphadenopathy:      " Cervical: No cervical adenopathy.   Skin:     General: Skin is warm and dry.      Findings: No rash.   Neurological:      Mental Status: She is alert and oriented to person, place, and time.      Cranial Nerves: No cranial nerve deficit.      Motor: Motor function is intact.      Coordination: Coordination normal.      Gait: Gait normal.      Deep Tendon Reflexes: Reflexes are normal and symmetric.   Psychiatric:         Mood and Affect: Mood normal.         Behavior: Behavior normal.         Thought Content: Thought content normal.         Cognition and Memory: Cognition normal.         Judgment: Judgment normal.         Lab Results   Component Value Date    WBC 10.24 12/06/2019    RBC 4.15 12/06/2019    HGB 13.7 12/06/2019    HCT 38.4 12/06/2019    MCV 92.5 12/06/2019    MCH 33.0 12/06/2019    MCHC 35.7 12/06/2019    RDW 11.6 (L) 12/06/2019    RDWSD 38.7 12/06/2019    MPV 8.8 12/06/2019     12/06/2019    NEUTRORELPCT 68.9 12/06/2019    LYMPHORELPCT 24.6 12/06/2019    MONORELPCT 5.5 12/06/2019    EOSRELPCT 0.2 (L) 12/06/2019    BASORELPCT 0.4 12/06/2019    AUTOIGPER 0.4 12/06/2019    NEUTROABS 7.06 (H) 12/06/2019    LYMPHSABS 2.52 12/06/2019    MONOSABS 0.56 12/06/2019    EOSABS 0.02 12/06/2019    BASOSABS 0.04 12/06/2019    AUTOIGNUM 0.04 12/06/2019    NRBC 0.0 12/06/2019     Lab Results   Component Value Date    GLUCOSE 132 (H) 12/06/2019    BUN 9 12/06/2019    CREATININE 0.72 12/06/2019    EGFRIFNONA 94 12/06/2019    EGFRIFAFRI 114 12/06/2019    BCR 12.5 12/06/2019    K 3.9 12/06/2019    CO2 23.5 12/06/2019    CALCIUM 9.5 12/06/2019          Assessment/Plan .  Problem List Items Addressed This Visit     Easy bruising    Relevant Orders    CBC & Differential    Positive depression screening    Current Assessment & Plan     Newly identified.  Start Sertraline 1/2 tablet daily x1 week, then may increase to whole tablet daily.  Continue to work on healthy diet and exercise.         Relevant Medications     sertraline (Zoloft) 50 MG tablet    Other Relevant Orders    Ambulatory Referral to Behavioral Health    Anxiety    Current Assessment & Plan     Newly identified.  Start Sertraline 1/2 tablet daily x1 week, then may increase to whole tablet daily.  Continue to work on healthy diet and exercise.  Consider an appointment with a counselor or therapist.         Relevant Medications    sertraline (Zoloft) 50 MG tablet    Other Relevant Orders    Ambulatory Referral to Behavioral Health      Other Visit Diagnoses     Encounter for annual physical exam    -  Primary    Relevant Orders    Comprehensive Metabolic Panel    CBC & Differential    Lipid Panel With LDL / HDL Ratio    Encounter for lipid screening for cardiovascular disease        Relevant Orders    Lipid Panel With LDL / HDL Ratio          Return in about 1 month (around 2/12/2021) for Recheck.or sooner if problems or concerns.  Impression: Health maintenance visit.  Currently, eats a healthy diet and has a somewhat regular exercise routine.  Cervical cancer screening: UTD.  Breast cancer screening: not indicated.   Colorectal cancer screening: not indicated.  Screening lab work includes: CMP, lipid.  Immunizations: declines flu vaccine.  Patient was advised to be evaluated by ophthamology.  Advice and education were given regarding nutrition and aerobic exercise.  Discussed AE/BBW with Sertraline.

## 2021-01-12 NOTE — PATIENT INSTRUCTIONS
Start Sertraline 1/2 tablet daily x1 week, then may increase to whole tablet daily.  Continue to work on healthy diet and exercise.  Follow up pending lab results.  Follow up in 1 month, or sooner if symptoms persist or worsen.  Consider an appointment with a counselor or therapist.

## 2021-01-13 LAB
ALBUMIN SERPL-MCNC: 4.7 G/DL (ref 3.5–5.2)
ALBUMIN/GLOB SERPL: 2 G/DL
ALP SERPL-CCNC: 48 U/L (ref 39–117)
ALT SERPL-CCNC: 23 U/L (ref 1–33)
AST SERPL-CCNC: 21 U/L (ref 1–32)
BASOPHILS # BLD AUTO: 0.04 10*3/MM3 (ref 0–0.2)
BASOPHILS NFR BLD AUTO: 0.5 % (ref 0–1.5)
BILIRUB SERPL-MCNC: 0.5 MG/DL (ref 0–1.2)
BUN SERPL-MCNC: 10 MG/DL (ref 6–20)
BUN/CREAT SERPL: 13.3 (ref 7–25)
CALCIUM SERPL-MCNC: 9.6 MG/DL (ref 8.6–10.5)
CHLORIDE SERPL-SCNC: 102 MMOL/L (ref 98–107)
CHOLEST SERPL-MCNC: 180 MG/DL (ref 0–200)
CO2 SERPL-SCNC: 26.8 MMOL/L (ref 22–29)
CREAT SERPL-MCNC: 0.75 MG/DL (ref 0.57–1)
EOSINOPHIL # BLD AUTO: 0.07 10*3/MM3 (ref 0–0.4)
EOSINOPHIL NFR BLD AUTO: 0.9 % (ref 0.3–6.2)
ERYTHROCYTE [DISTWIDTH] IN BLOOD BY AUTOMATED COUNT: 11.7 % (ref 12.3–15.4)
GLOBULIN SER CALC-MCNC: 2.4 GM/DL
GLUCOSE SERPL-MCNC: 115 MG/DL (ref 65–99)
HCT VFR BLD AUTO: 37.6 % (ref 34–46.6)
HDLC SERPL-MCNC: 71 MG/DL (ref 40–60)
HGB BLD-MCNC: 12.8 G/DL (ref 12–15.9)
IMM GRANULOCYTES # BLD AUTO: 0.02 10*3/MM3 (ref 0–0.05)
IMM GRANULOCYTES NFR BLD AUTO: 0.3 % (ref 0–0.5)
LDLC SERPL CALC-MCNC: 88 MG/DL (ref 0–100)
LDLC/HDLC SERPL: 1.2 {RATIO}
LYMPHOCYTES # BLD AUTO: 2.38 10*3/MM3 (ref 0.7–3.1)
LYMPHOCYTES NFR BLD AUTO: 30.7 % (ref 19.6–45.3)
MCH RBC QN AUTO: 31.4 PG (ref 26.6–33)
MCHC RBC AUTO-ENTMCNC: 34 G/DL (ref 31.5–35.7)
MCV RBC AUTO: 92.4 FL (ref 79–97)
MONOCYTES # BLD AUTO: 0.43 10*3/MM3 (ref 0.1–0.9)
MONOCYTES NFR BLD AUTO: 5.5 % (ref 5–12)
NEUTROPHILS # BLD AUTO: 4.82 10*3/MM3 (ref 1.7–7)
NEUTROPHILS NFR BLD AUTO: 62.1 % (ref 42.7–76)
NRBC BLD AUTO-RTO: 0 /100 WBC (ref 0–0.2)
PLATELET # BLD AUTO: 352 10*3/MM3 (ref 140–450)
POTASSIUM SERPL-SCNC: 4.2 MMOL/L (ref 3.5–5.2)
PROT SERPL-MCNC: 7.1 G/DL (ref 6–8.5)
RBC # BLD AUTO: 4.07 10*6/MM3 (ref 3.77–5.28)
SODIUM SERPL-SCNC: 139 MMOL/L (ref 136–145)
TRIGL SERPL-MCNC: 118 MG/DL (ref 0–150)
VLDLC SERPL CALC-MCNC: 21 MG/DL (ref 5–40)
WBC # BLD AUTO: 7.76 10*3/MM3 (ref 3.4–10.8)

## 2021-01-13 NOTE — ASSESSMENT & PLAN NOTE
Newly identified.  Start Sertraline 1/2 tablet daily x1 week, then may increase to whole tablet daily.  Continue to work on healthy diet and exercise.  Consider an appointment with a counselor or therapist.

## 2021-01-13 NOTE — ASSESSMENT & PLAN NOTE
Newly identified.  Start Sertraline 1/2 tablet daily x1 week, then may increase to whole tablet daily.  Continue to work on healthy diet and exercise.

## 2021-02-15 ENCOUNTER — TELEMEDICINE (OUTPATIENT)
Dept: FAMILY MEDICINE CLINIC | Facility: CLINIC | Age: 36
End: 2021-02-15

## 2021-02-15 DIAGNOSIS — J30.9 ALLERGIC RHINITIS, UNSPECIFIED SEASONALITY, UNSPECIFIED TRIGGER: ICD-10-CM

## 2021-02-15 DIAGNOSIS — Z13.31 POSITIVE DEPRESSION SCREENING: ICD-10-CM

## 2021-02-15 DIAGNOSIS — R51.9 NONINTRACTABLE EPISODIC HEADACHE, UNSPECIFIED HEADACHE TYPE: ICD-10-CM

## 2021-02-15 DIAGNOSIS — F41.9 ANXIETY: ICD-10-CM

## 2021-02-15 DIAGNOSIS — R12 HEARTBURN: Primary | ICD-10-CM

## 2021-02-15 PROCEDURE — 99213 OFFICE O/P EST LOW 20 MIN: CPT | Performed by: NURSE PRACTITIONER

## 2021-02-15 RX ORDER — LORATADINE 10 MG/1
1 CAPSULE, LIQUID FILLED ORAL DAILY
COMMUNITY
End: 2022-02-08

## 2021-02-15 NOTE — PATIENT INSTRUCTIONS
Continue Sertraline daily.  Continue to work on healthy diet and exercise.  Keep headache diary.  Follow up in 3 months, or sooner if problems or concerns.

## 2021-02-15 NOTE — PROGRESS NOTES
Subjective   Neli Bravo is a 35 y.o. female.     Chief Complaint   Patient presents with   • Anxiety     You have chosen to receive care through a telehealth visit.  Do you consent to use a video/audio connection for your medical care today? Yes    This was an audio and video enabled telemedicine encounter using [employer].    History of Present Illness   Patient presents for follow up anxiety; started Sertraline daily and has helped a lot; no side effects with medication; Sertraline has helped irritability and to keep calm; sleep has improved; no problems falling asleep or staying asleep; has not been as stressed with work; no more problem with increased HR; energy has improved; see PHQ-9; no SI/HI; happy with current dose.    The following portions of the patient's history were reviewed and updated as appropriate: allergies, current medications, past family history, past medical history, past social history, past surgical history and problem list.      Current Outpatient Medications   Medication Sig Dispense Refill   • acyclovir (ZOVIRAX) 5 % cream Apply as needed for herpes outbreak. 2 g 3   • Lo Loestrin Fe 1 MG-10 MCG / 10 MCG tablet Take 1 tablet by mouth Daily. 84 tablet 3   • Loratadine (Claritin) 10 MG capsule Take 1 capsule by mouth Daily.     • sertraline (Zoloft) 50 MG tablet Take 1 tablet by mouth Daily. 90 tablet 0   • spironolactone (ALDACTONE) 50 MG tablet TAKE 1 TABLET DAILY  6   • valACYclovir (VALTREX) 500 MG tablet TAKE 1 TABLET BY MOUTH TWICE A DAY FOR 3 DAYS 30 tablet 2     No current facility-administered medications for this visit.        Past Medical History:   Diagnosis Date   • Acne    • Allergic rhinitis    • Migraine headache    • Positive depression screening 1/12/2021       Past Surgical History:   Procedure Laterality Date   • WISDOM TOOTH EXTRACTION         Family History   Problem Relation Age of Onset   • Heart disease Mother         has AICD due to ventricular arrhythmia   •  Arrhythmia Mother    • Breast cancer Paternal Grandmother 70   • Diabetes Maternal Grandmother    • Heart attack Maternal Grandmother         in her 40s   • Hypertension Maternal Grandfather        Social History     Socioeconomic History   • Marital status: Single     Spouse name: Not on file   • Number of children: Not on file   • Years of education: Not on file   • Highest education level: Not on file   Tobacco Use   • Smoking status: Former Smoker     Packs/day: 0.50     Types: Cigarettes     Quit date: 2020     Years since quittin.0   • Smokeless tobacco: Never Used   • Tobacco comment: previously smoked less than 1/2 pack per day for 3 years   Substance and Sexual Activity   • Alcohol use: Yes     Frequency: 2-4 times a month     Comment: occasional   • Drug use: No   • Sexual activity: Yes     Partners: Male     Birth control/protection: Pill     Comment: boyfriend        Review of Systems   Constitutional: Negative for appetite change, fatigue, unexpected weight gain and unexpected weight loss.   HENT: Negative for ear pain and sore throat. Rhinorrhea: mild congestion, takes Claritin daily.    Respiratory: Negative for cough, chest tightness and shortness of breath.    Cardiovascular: Negative for chest pain, palpitations and leg swelling.   Gastrointestinal: Negative for abdominal pain, blood in stool, constipation, diarrhea and GERD. Indigestion: frequenty heartburn, takes Omeprazole for period of time during flares and helps.   Genitourinary: Negative for dysuria and frequency.   Skin: Negative for rash.   Neurological: Negative for dizziness and syncope. Headache: gets headache 2-3 times per week; Advil helps; has been using migraine stick and helps; will get change in vision and nausea/vomiting with migraines, gets migraine about twice per year.     PHQ-9 Depression Screening  Little interest or pleasure in doing things? 0   Feeling down, depressed, or hopeless? 0   Trouble falling or staying  asleep, or sleeping too much? 1   Feeling tired or having little energy? 0   Poor appetite or overeating? 0   Feeling bad about yourself - or that you are a failure or have let yourself or your family down? 0   Trouble concentrating on things, such as reading the newspaper or watching television? 0   Moving or speaking so slowly that other people could have noticed? Or the opposite - being so fidgety or restless that you have been moving around a lot more than usual? 0   Thoughts that you would be better off dead, or of hurting yourself in some way? 0   PHQ-9 Total Score 1   If you checked off any problems, how difficult have these problems made it for you to do your work, take care of things at home, or get along with other people?         Objective   There were no vitals filed for this visit.  There is no height or weight on file to calculate BMI.    Physical Exam  Constitutional:       General: She is not in acute distress.  Pulmonary:      Effort: Pulmonary effort is normal. No respiratory distress.   Neurological:      Mental Status: She is alert and oriented to person, place, and time.   Psychiatric:         Mood and Affect: Mood normal.         Behavior: Behavior normal.         Thought Content: Thought content normal.         Judgment: Judgment normal.         Lab Results   Component Value Date    WBC 7.76 01/12/2021    RBC 4.07 01/12/2021    HGB 12.8 01/12/2021    HCT 37.6 01/12/2021    MCV 92.4 01/12/2021    MCH 31.4 01/12/2021    MCHC 34.0 01/12/2021    RDW 11.7 (L) 01/12/2021    RDWSD 38.7 12/06/2019    MPV 8.8 12/06/2019     01/12/2021    NEUTRORELPCT 62.1 01/12/2021    LYMPHORELPCT 30.7 01/12/2021    MONORELPCT 5.5 01/12/2021    EOSRELPCT 0.9 01/12/2021    BASORELPCT 0.5 01/12/2021    AUTOIGPER 0.4 12/06/2019    NEUTROABS 4.82 01/12/2021    LYMPHSABS 2.38 01/12/2021    MONOSABS 0.43 01/12/2021    EOSABS 0.07 01/12/2021    BASOSABS 0.04 01/12/2021    AUTOIGNUM 0.04 12/06/2019    [facility] 0.0  01/12/2021     Lab Results   Component Value Date    GLUCOSE 132 (H) 12/06/2019    BUN 10 01/12/2021    CREATININE 0.75 01/12/2021    EGFRIFNONA 89 01/12/2021    EGFRIFAFRI 108 01/12/2021    BCR 13.3 01/12/2021    K 4.2 01/12/2021    CO2 26.8 01/12/2021    CALCIUM 9.6 01/12/2021    PROTENTOTREF 7.1 01/12/2021    ALBUMIN 4.70 01/12/2021    LABIL2 2.0 01/12/2021    AST 21 01/12/2021    ALT 23 01/12/2021      Lab Results   Component Value Date    CHLPL 180 01/12/2021    TRIG 118 01/12/2021    HDL 71 (H) 01/12/2021    VLDL 21 01/12/2021    LDL 88 01/12/2021         Assessment/Plan .  Problem List Items Addressed This Visit     Anxiety    Current Assessment & Plan     Much improved with Sertraline daily.  Continue to work on healthy diet and exercise.         Relevant Medications    sertraline (Zoloft) 50 MG tablet    Heartburn - Primary    Current Assessment & Plan     Continue Prilosec daily as needed.         Nonintractable episodic headache    Current Assessment & Plan     Keep headache diary.         Allergic rhinitis    Current Assessment & Plan     Continue Claritin daily.         RESOLVED: Positive depression screening    Relevant Medications    sertraline (Zoloft) 50 MG tablet          Return in about 3 months (around 5/15/2021) for Recheck. or sooner if problems or concerns.    Unable to complete visit using a video connection to the patient. A phone visit was used to complete this visits. Total time of discussion was 15 minutes.

## 2021-05-27 RX ORDER — VALACYCLOVIR HYDROCHLORIDE 500 MG/1
TABLET, FILM COATED ORAL
Qty: 30 TABLET | Refills: 2 | Status: SHIPPED | OUTPATIENT
Start: 2021-05-27 | End: 2021-11-05

## 2021-11-05 ENCOUNTER — OFFICE VISIT (OUTPATIENT)
Dept: OBSTETRICS AND GYNECOLOGY | Facility: CLINIC | Age: 36
End: 2021-11-05

## 2021-11-05 VITALS
DIASTOLIC BLOOD PRESSURE: 67 MMHG | HEIGHT: 66 IN | WEIGHT: 189 LBS | SYSTOLIC BLOOD PRESSURE: 118 MMHG | BODY MASS INDEX: 30.37 KG/M2

## 2021-11-05 DIAGNOSIS — Z00.00 ANNUAL PHYSICAL EXAM: Primary | ICD-10-CM

## 2021-11-05 DIAGNOSIS — B37.31 RECURRENT CANDIDIASIS OF VAGINA: ICD-10-CM

## 2021-11-05 DIAGNOSIS — B00.9 HSV INFECTION: ICD-10-CM

## 2021-11-05 PROCEDURE — 99395 PREV VISIT EST AGE 18-39: CPT | Performed by: OBSTETRICS & GYNECOLOGY

## 2021-11-05 PROCEDURE — 99212 OFFICE O/P EST SF 10 MIN: CPT | Performed by: OBSTETRICS & GYNECOLOGY

## 2021-11-05 RX ORDER — VALACYCLOVIR HYDROCHLORIDE 500 MG/1
500 TABLET, FILM COATED ORAL DAILY
Qty: 30 TABLET | Refills: 11 | Status: SHIPPED | OUTPATIENT
Start: 2021-11-05 | End: 2021-12-05

## 2021-11-05 NOTE — PROGRESS NOTES
CARMEN Bravo  is a 36 y.o. female who presents for 2 reasons.  First, she would like a routine gynecologic exam.  Overall, she is feeling well.  Bowels and bladder are functioning normally.  The patient stopped using her oral contraceptive pills 2 or 3 months ago.  She is using condoms for contraception and is satisfied with this.  Cycles are regular and predictable.  They are heavy.  Next, the patient reports that she has recurrent yeast infections.  She reports that approximately every second month, she develops itching and burning of the vulva and vagina.  She attributes this to sweating.  It has been less prevalent as the weather has cooled.  Each time, she treats with over-the-counter Monistat and the problem resolves with this treatment.    Chief Complaint   Patient presents with   • Gynecologic Exam     Patient is here for a annual.       Past Medical History:   Diagnosis Date   • Acne    • Allergic rhinitis    • Migraine headache    • Positive depression screening 2021       Past Surgical History:   Procedure Laterality Date   • WISDOM TOOTH EXTRACTION         Social History     Socioeconomic History   • Marital status: Single   Tobacco Use   • Smoking status: Former Smoker     Packs/day: 0.50     Types: Cigarettes     Quit date: 2020     Years since quittin.8   • Smokeless tobacco: Never Used   • Tobacco comment: previously smoked less than 1/2 pack per day for 3 years   Substance and Sexual Activity   • Alcohol use: Yes     Comment: occasional   • Drug use: No   • Sexual activity: Yes     Partners: Male     Birth control/protection: Pill     Comment: boyfriend        The following portions of the patient's history were reviewed and updated as appropriate: allergies, current medications, past family history, past medical history, past social history, past surgical history and problem list.    Review of Systems      This is negative for fever or chills.  Negative for nausea or vomiting.   Negative for abnormal bleeding.  Positive for recurrent yeast infections.  Negative for unexplained weight change.  All other systems are reviewed and are negative.    Physical Exam  Vitals and nursing note reviewed.   Constitutional:       Appearance: She is well-developed.   HENT:      Head: Normocephalic and atraumatic.   Cardiovascular:      Rate and Rhythm: Normal rate and regular rhythm.   Pulmonary:      Effort: Pulmonary effort is normal.      Breath sounds: Normal breath sounds. No wheezing or rales.   Chest:      Comments: The breasts are homogeneous.  There are no palpable lumps.  Nipple discharge and axillary adenopathy are absent.  Abdominal:      General: There is no distension.      Palpations: Abdomen is soft.      Tenderness: There is no abdominal tenderness.   Genitourinary:     Labia:         Right: No lesion.         Left: No lesion.       Vagina: Normal. No vaginal discharge.      Cervix: No cervical motion tenderness.      Uterus: Normal. Not enlarged and not tender.       Adnexa:         Right: No mass or tenderness.          Left: No mass or tenderness.     Skin:     General: Skin is warm and dry.   Neurological:      Mental Status: She is alert and oriented to person, place, and time.         Assessment    Diagnoses and all orders for this visit:    1. Annual physical exam (Primary)  -     IGP, Rfx Aptima HPV ASCU    2. Recurrent candidiasis of vagina    3. HSV infection    Other orders  -     valACYclovir (Valtrex) 500 MG tablet; Take 1 tablet by mouth Daily for 30 days.  Dispense: 30 tablet; Refill: 11        Plan  1. Annual examination performed  2. Counseled regarding contraceptive options.  The patient is very satisfied using condoms.  She plans to continue this.  3. Recurrent episodes of candidiasis.  Counseled and questions answered.  The patient was diagnosed with borderline diabetes many years ago.  I recommend that she follow-up with her primary care physician to assess for  possible type 2 diabetes.  The patient agrees with this recommendation.  Also, we discussed preventative measures.  I recommend that the patient reacidify the vagina using rePHresh.  Also, I recommend probiotics to reestablish a colony of lactobacillus.  I explained my rationale for these recommendations and answered the patient's questions.  She agrees to proceed.  She will contact me if she continues to have recurrent candidal infections.  4. Genital herpes.  The patient has more frequent outbreaks and would like to go to a suppressive dose.  She prefers to try 500 mg daily rather than 1000.  A prescription has been sent.    No follow-ups on file.    Social History     Tobacco Use   Smoking Status Former Smoker   • Packs/day: 0.50   • Types: Cigarettes   • Quit date: 2020   • Years since quittin.8   Smokeless Tobacco Never Used   Tobacco Comment    previously smoked less than 1/2 pack per day for 3 years

## 2021-11-10 ENCOUNTER — OFFICE VISIT (OUTPATIENT)
Dept: FAMILY MEDICINE CLINIC | Facility: CLINIC | Age: 36
End: 2021-11-10

## 2021-11-10 VITALS
HEIGHT: 66 IN | DIASTOLIC BLOOD PRESSURE: 84 MMHG | TEMPERATURE: 98.4 F | OXYGEN SATURATION: 98 % | BODY MASS INDEX: 30.63 KG/M2 | SYSTOLIC BLOOD PRESSURE: 121 MMHG | WEIGHT: 190.6 LBS | HEART RATE: 87 BPM

## 2021-11-10 DIAGNOSIS — B37.31 VAGINAL CANDIDIASIS: ICD-10-CM

## 2021-11-10 DIAGNOSIS — R73.03 PREDIABETES: Primary | ICD-10-CM

## 2021-11-10 DIAGNOSIS — R12 HEARTBURN: ICD-10-CM

## 2021-11-10 DIAGNOSIS — F41.9 ANXIETY: ICD-10-CM

## 2021-11-10 DIAGNOSIS — Z11.59 NEED FOR HEPATITIS C SCREENING TEST: ICD-10-CM

## 2021-11-10 PROCEDURE — 99213 OFFICE O/P EST LOW 20 MIN: CPT | Performed by: NURSE PRACTITIONER

## 2021-11-10 RX ORDER — OMEPRAZOLE 20 MG/1
20 CAPSULE, DELAYED RELEASE ORAL DAILY PRN
COMMUNITY
End: 2022-02-08

## 2021-11-10 NOTE — PATIENT INSTRUCTIONS
Continue to work on healthy diet and exercise.  Follow up pending lab results.  Follow up in 6 months, or sooner if symptoms persist or worsen.

## 2021-11-10 NOTE — PROGRESS NOTES
Subjective   Neli Bravo is a 36 y.o. female.     Chief Complaint   Patient presents with   • Recurrent yeast infections     GYNO suggested checking DM       History of Present Illness   Patient presents for follow up anxiety: no longer Sertraline daily; stopped taking about 6 months ago; no recent anxiety or problems; changed jobs and really helped; no problems with sleep; no SI/HI.    Has had frequent yeast infections; GYN recommended checking for diabetes; has had fatigue; no urinary frequency; gets up once per night to urinate, no change, has done for years; drinks liquids before bed; occasional numbness of right arm or will wake up and feel tingly; not much exercise recently due to work schedule; has not been eating as healthy recently, trying to get back on track; has been on Glucophage at age 15 years, then stopped at age 16-19 years.    F/U heartburn: takes Omeprazole daily as needed and works well.    Takes Spironolactone daily for acne per dermatology.    The following portions of the patient's history were reviewed and updated as appropriate: allergies, current medications, past family history, past medical history, past social history, past surgical history and problem list.    Current Outpatient Medications on File Prior to Visit   Medication Sig   • acyclovir (ZOVIRAX) 5 % cream Apply as needed for herpes outbreak.   • Loratadine (Claritin) 10 MG capsule Take 1 capsule by mouth Daily.   • omeprazole (priLOSEC) 20 MG capsule Take 20 mg by mouth Daily As Needed.   • spironolactone (ALDACTONE) 50 MG tablet TAKE 1 TABLET DAILY   • valACYclovir (Valtrex) 500 MG tablet Take 1 tablet by mouth Daily for 30 days.   • [DISCONTINUED] Lo Loestrin Fe 1 MG-10 MCG / 10 MCG tablet Take 1 tablet by mouth Daily.   • [DISCONTINUED] sertraline (Zoloft) 50 MG tablet Take 1 tablet by mouth Daily.     No current facility-administered medications on file prior to visit.        Past Medical History:   Diagnosis Date   • Acne     • Allergic rhinitis    • Anxiety 2021   • Migraine headache    • Positive depression screening 2021       Past Surgical History:   Procedure Laterality Date   • WISDOM TOOTH EXTRACTION         Family History   Problem Relation Age of Onset   • Heart disease Mother         has AICD due to ventricular arrhythmia   • Arrhythmia Mother    • Breast cancer Paternal Grandmother 70   • Diabetes Maternal Grandmother    • Heart attack Maternal Grandmother         in her 40s   • Hypertension Maternal Grandfather        Social History     Socioeconomic History   • Marital status: Single   Tobacco Use   • Smoking status: Former Smoker     Packs/day: 0.50     Types: Cigarettes     Quit date: 2020     Years since quittin.8   • Smokeless tobacco: Never Used   • Tobacco comment: previously smoked less than 1/2 pack per day for 3 years   Substance and Sexual Activity   • Alcohol use: Yes     Comment: occasional   • Drug use: No   • Sexual activity: Yes     Partners: Male     Birth control/protection: Pill     Comment: boyfriend        Review of Systems   Constitutional: Positive for fatigue. Negative for appetite change (has not been eating as healthy recently, trying to get back on track), chills, fever, unexpected weight gain and unexpected weight loss.   HENT: Positive for postnasal drip. Negative for ear pain, sinus pressure and trouble swallowing. Rhinorrhea: some stuffy nose. Sore throat: some scratchy throat for last couple of days; attributes to weather change; improved with salt water gargles.    Eyes: Negative for blurred vision.   Respiratory: Negative for cough, chest tightness and shortness of breath.    Cardiovascular: Negative for chest pain, palpitations and leg swelling.   Gastrointestinal: Negative for abdominal pain, blood in stool, constipation and diarrhea.   Endocrine: Negative for polydipsia.   Genitourinary: Negative for dysuria and frequency.   Musculoskeletal: Negative for arthralgias.  "Back pain: occasional lower back discomfort, no recent radiation of pain down legs; resolves after a few days using heat.   Skin: Negative for rash.   Neurological: Negative for dizziness, syncope and headache.       Objective   Vitals:    11/10/21 0956   BP: 121/84   Pulse: 87   Temp: 98.4 °F (36.9 °C)   TempSrc: Infrared   SpO2: 98%   Weight: 86.5 kg (190 lb 9.6 oz)   Height: 167.6 cm (66\")   PainSc: 0-No pain     Body mass index is 30.76 kg/m².    Physical Exam  Vitals and nursing note reviewed.   Constitutional:       General: She is not in acute distress.     Appearance: She is well-developed and well-groomed. She is not ill-appearing or diaphoretic.   HENT:      Head: Normocephalic.      Right Ear: Tympanic membrane and external ear normal. No decreased hearing noted.      Left Ear: Tympanic membrane and external ear normal. No decreased hearing noted.      Nose: Nose normal.      Right Sinus: No maxillary sinus tenderness or frontal sinus tenderness.      Left Sinus: No maxillary sinus tenderness or frontal sinus tenderness.      Mouth/Throat:      Mouth: Mucous membranes are moist.      Pharynx: No oropharyngeal exudate or posterior oropharyngeal erythema.   Eyes:      Conjunctiva/sclera: Conjunctivae normal.   Neck:      Vascular: No carotid bruit.   Cardiovascular:      Rate and Rhythm: Normal rate and regular rhythm.      Pulses: Normal pulses.      Heart sounds: Normal heart sounds. No murmur heard.      Pulmonary:      Effort: Pulmonary effort is normal. No respiratory distress.      Breath sounds: Normal breath sounds.   Abdominal:      General: Bowel sounds are normal.      Palpations: Abdomen is soft. There is no hepatomegaly or splenomegaly.      Tenderness: There is no abdominal tenderness. There is no guarding.   Musculoskeletal:      Cervical back: Normal range of motion and neck supple.      Right lower leg: No edema.      Left lower leg: No edema.   Lymphadenopathy:      Cervical: No cervical " adenopathy.   Skin:     General: Skin is warm and dry.      Findings: No rash.   Neurological:      Mental Status: She is alert and oriented to person, place, and time.      Gait: Gait is intact.   Psychiatric:         Mood and Affect: Mood normal.         Behavior: Behavior normal.         Thought Content: Thought content normal.         Cognition and Memory: Cognition normal.         Judgment: Judgment normal.         Lab Results   Component Value Date    WBC 7.76 01/12/2021    RBC 4.07 01/12/2021    HGB 12.8 01/12/2021    HCT 37.6 01/12/2021    MCV 92.4 01/12/2021    MCH 31.4 01/12/2021    MCHC 34.0 01/12/2021    RDW 11.7 (L) 01/12/2021    RDWSD 38.7 12/06/2019    MPV 8.8 12/06/2019     01/12/2021    NEUTRORELPCT 62.1 01/12/2021    LYMPHORELPCT 30.7 01/12/2021    MONORELPCT 5.5 01/12/2021    EOSRELPCT 0.9 01/12/2021    BASORELPCT 0.5 01/12/2021    AUTOIGPER 0.4 12/06/2019    NEUTROABS 4.82 01/12/2021    LYMPHSABS 2.38 01/12/2021    MONOSABS 0.43 01/12/2021    EOSABS 0.07 01/12/2021    BASOSABS 0.04 01/12/2021    AUTOIGNUM 0.04 12/06/2019    NRBC 0.0 01/12/2021     Lab Results   Component Value Date    GLUCOSE 115 (H) 01/12/2021    BUN 10 01/12/2021    CREATININE 0.75 01/12/2021    EGFRIFNONA 89 01/12/2021    EGFRIFAFRI 108 01/12/2021    BCR 13.3 01/12/2021    K 4.2 01/12/2021    CO2 26.8 01/12/2021    CALCIUM 9.6 01/12/2021    PROTENTOTREF 7.1 01/12/2021    ALBUMIN 4.70 01/12/2021    LABIL2 2.0 01/12/2021    AST 21 01/12/2021    ALT 23 01/12/2021      Lab Results   Component Value Date    CHLPL 180 01/12/2021    TRIG 118 01/12/2021    HDL 71 (H) 01/12/2021    VLDL 21 01/12/2021    LDL 88 01/12/2021         Assessment/Plan .  Problem List Items Addressed This Visit     Heartburn    Current Assessment & Plan     Continue Omeprazole daily as needed.         Relevant Medications    omeprazole (priLOSEC) 20 MG capsule    Prediabetes - Primary    Current Assessment & Plan     Continue to work on healthy diet and  exercise.         Relevant Orders    Hemoglobin A1c    Basic Metabolic Panel    Vaginal candidiasis    Relevant Orders    Hemoglobin A1c    RESOLVED: Anxiety    Current Assessment & Plan     Stable off medication.           Other Visit Diagnoses     Need for hepatitis C screening test        Relevant Orders    Hepatitis C Antibody          Return in about 6 months (around 5/10/2022) for Annual physical, Recheck. or sooner if symptoms persist or worsen.  Patient has had frequent yeast infections; will check A1c today as recommended.       COVID-19 Precautions - Patient was compliant in wearing a mask. When I saw the patient, I used appropriate personal protective equipment (PPE) including mask, gloves, and eye shield (standard procedure).  Hand hygiene was completed before and after seeing the patient.

## 2021-11-11 DIAGNOSIS — E11.9 TYPE 2 DIABETES MELLITUS WITHOUT COMPLICATION, WITHOUT LONG-TERM CURRENT USE OF INSULIN (HCC): Primary | ICD-10-CM

## 2021-11-11 LAB
BUN SERPL-MCNC: 10 MG/DL (ref 6–20)
BUN/CREAT SERPL: 13 (ref 9–23)
CALCIUM SERPL-MCNC: 9.9 MG/DL (ref 8.7–10.2)
CHLORIDE SERPL-SCNC: 101 MMOL/L (ref 96–106)
CO2 SERPL-SCNC: 23 MMOL/L (ref 20–29)
CREAT SERPL-MCNC: 0.77 MG/DL (ref 0.57–1)
GLUCOSE SERPL-MCNC: 131 MG/DL (ref 65–99)
HBA1C MFR BLD: 6.9 % (ref 4.8–5.6)
HCV AB S/CO SERPL IA: <0.1 S/CO RATIO (ref 0–0.9)
POTASSIUM SERPL-SCNC: 4.9 MMOL/L (ref 3.5–5.2)
SODIUM SERPL-SCNC: 137 MMOL/L (ref 134–144)

## 2021-11-11 RX ORDER — METFORMIN HYDROCHLORIDE 500 MG/1
500 TABLET, EXTENDED RELEASE ORAL
Qty: 30 TABLET | Refills: 2 | Status: SHIPPED | OUTPATIENT
Start: 2021-11-11 | End: 2022-02-08 | Stop reason: SDUPTHER

## 2021-11-11 NOTE — ASSESSMENT & PLAN NOTE
Continue to work on healthy diet and exercise.   Subjective:       Patient ID: Janneth Javier is a 37 y.o. female.    Chief Complaint: Annual Exam    HPI    37 y.o. female here for annual exam.     Health Maintenance/ Screening:   Labs: due  Cervical Cancer:  [facility] [id number], she will schedule with them again      Vaccines:   Influenza:    Tetanus: [id number]      Past Medical History:   Diagnosis Date    Multiple thyroid nodules 3/1/2019    US showed benign nodules     Past Surgical History:   Procedure Laterality Date    ENDOSCOPIC ULTRASOUND OF UPPER GASTROINTESTINAL TRACT N/A 6/28/2019    Procedure: ULTRASOUND-ENDOSCOPIC-UPPER;  Surgeon: Roe Pereira MD;  Location: [facility];  Service: Endoscopy;  Laterality: N/A;     Family History   Problem Relation Age of Onset    Diabetes Mellitus Mother     Hypertension Mother     Hyperlipidemia Mother     Stomach cancer Father     Lung cancer Paternal Grandmother      Social History     Socioeconomic History    Marital status:      Spouse name: Not on file    Number of children: Not on file    Years of education: Not on file    Highest education level: Not on file   Occupational History    Not on file   Social Needs    Financial resource strain: Not hard at all    Food insecurity     Worry: Never true     Inability: Never true    Transportation needs     Medical: No     Non-medical: No   Tobacco Use    Smoking status: Never Smoker    Smokeless tobacco: Never Used   Substance and Sexual Activity    Alcohol use: Yes     Frequency: 2-4 times a month     Drinks per session: 1 or 2     Binge frequency: Less than monthly     Comment: occasional    Drug use: Never    Sexual activity: Yes     Partners: Male   Lifestyle    Physical activity     Days per week: 0 days     Minutes per session: 0 min    Stress: To some extent   Relationships    Social connections     Talks on phone: Once a week     Gets together: Twice a week     Attends Zoroastrian service: Not on file     Active  "member of club or organization: No     Attends meetings of clubs or organizations: Patient refused     Relationship status:    Other Topics Concern    Not on file   Social History Narrative    Nurse     to Alfonso; no children    Regular exercise: yoga     Review of patient's allergies indicates:  No Known Allergies  No current outpatient medications on file.  No current facility-administered medications for this visit.     Facility-Administered Medications Ordered in Other Visits:     0.9%  NaCl infusion, , Intravenous, Continuous, Roe Pereira MD, Last Rate: 20 mL/hr at 06/28/19 0815    sodium chloride 0.9% flush 10 mL, 10 mL, Intravenous, PRN, Roe Pereira MD      Review of Systems   Constitutional: Negative for activity change and unexpected weight change.   HENT: Negative for hearing loss, rhinorrhea and trouble swallowing.    Eyes: Negative for discharge and visual disturbance.   Respiratory: Negative for chest tightness and wheezing.    Cardiovascular: Negative for chest pain and palpitations.   Gastrointestinal: Negative for blood in stool, constipation, diarrhea and vomiting.   Endocrine: Negative for polydipsia and polyuria.   Genitourinary: Negative for difficulty urinating, dysuria, hematuria and menstrual problem.   Musculoskeletal: Negative for arthralgias, joint swelling and neck pain.   Neurological: Negative for weakness and headaches.   Psychiatric/Behavioral: Negative for confusion and dysphoric mood.       Objective:        Vitals:    08/31/20 0743   BP: 122/60   BP Location: Right arm   Patient Position: Sitting   Pulse: 64   Resp: 16   Temp: 97.8 °F (36.6 °C)   TempSrc: Oral   Weight: 80.1 kg (176 lb 9.4 oz)   Height: 5' 3" (1.6 m)       Body mass index is 31.28 kg/m².    Physical Exam  Constitutional:       General: She is not in acute distress.     Appearance: She is well-developed. She is not diaphoretic.   HENT:      Head: Normocephalic and atraumatic.      Right Ear: " Tympanic membrane normal.      Left Ear: Tympanic membrane normal.   Eyes:      Conjunctiva/sclera: Conjunctivae normal.   Neck:      Musculoskeletal: Normal range of motion and neck supple.      Thyroid: No thyromegaly.      Trachea: No tracheal deviation.   Cardiovascular:      Rate and Rhythm: Normal rate and regular rhythm.      Heart sounds: Normal heart sounds.   Pulmonary:      Effort: Pulmonary effort is normal.      Breath sounds: Normal breath sounds.   Abdominal:      General: Bowel sounds are normal. There is no distension.      Palpations: Abdomen is soft.      Tenderness: There is no abdominal tenderness.   Musculoskeletal:         General: No swelling.   Lymphadenopathy:      Cervical: No cervical adenopathy.   Skin:     General: Skin is warm and dry.      Nails: There is no clubbing.     Neurological:      Mental Status: She is alert and oriented to person, place, and time.      Sensory: No sensory deficit.   Psychiatric:         Behavior: Behavior normal.         Judgment: Judgment normal.         Assessment:     1. Annual physical exam    2. Serum total bilirubin elevated    3. Skin exam, screening for cancer           Plan:         1. Annual physical exam  - she will have pap smear results faxed to our clinic  - CBC auto differential; Future  - Comprehensive metabolic panel; Future  - Hemoglobin A1C; Future  - Lipid Panel; Future  - TSH; Future    2. Serum total bilirubin elevated  - monitored by GI  - Comprehensive metabolic panel; Future    3. Skin exam, screening for cancer  - Ambulatory referral/consult to Dermatology; Future           Patient note was created using MModal Dictation.  Any errors in syntax or even information may not have been identified and edited on initial review prior to signing this note.

## 2021-12-14 ENCOUNTER — OFFICE VISIT (OUTPATIENT)
Dept: OBSTETRICS AND GYNECOLOGY | Facility: CLINIC | Age: 36
End: 2021-12-14

## 2021-12-14 VITALS
WEIGHT: 187 LBS | DIASTOLIC BLOOD PRESSURE: 67 MMHG | SYSTOLIC BLOOD PRESSURE: 118 MMHG | BODY MASS INDEX: 30.05 KG/M2 | HEIGHT: 66 IN

## 2021-12-14 DIAGNOSIS — B37.9 CANDIDIASIS: Primary | ICD-10-CM

## 2021-12-14 PROCEDURE — 99213 OFFICE O/P EST LOW 20 MIN: CPT | Performed by: OBSTETRICS & GYNECOLOGY

## 2021-12-14 NOTE — PROGRESS NOTES
HPI   Neli Bravo  is a 36 y.o. female who presents to discuss recurrent vulvovaginitis.  She has been treated for yeast infections multiple times.  Usually, this responds to Monistat, but recurs the following month.  The patient has followed up with her internist and has begun treatment for borderline type 2 diabetes, or prediabetes.  She has only been doing this for 1 month.  Also, she has started using a probiotic.  She has also been douching.  She reports that her problem worsened within the last month.  Her sanitary products changed at that time.  She buys the same brand, but they have change the product itself.  She reports vaginal swelling and pain along with vulvar swelling and pain.  This has worsened over the last several days.  She reports that it worsens right after her cycle each month.  Also, she has a white vaginal discharge.    Chief Complaint   Patient presents with   • Follow-up     Patient is here for a f/u for painful intercourse.       Past Medical History:   Diagnosis Date   • Acne    • Allergic rhinitis    • Anxiety 2021   • Migraine headache    • Positive depression screening 2021       Past Surgical History:   Procedure Laterality Date   • WISDOM TOOTH EXTRACTION         Social History     Socioeconomic History   • Marital status: Single   Tobacco Use   • Smoking status: Former Smoker     Packs/day: 0.50     Types: Cigarettes     Quit date: 2020     Years since quittin.9   • Smokeless tobacco: Never Used   • Tobacco comment: previously smoked less than 1/2 pack per day for 3 years   Substance and Sexual Activity   • Alcohol use: Yes     Comment: occasional   • Drug use: No   • Sexual activity: Yes     Partners: Male     Birth control/protection: Pill     Comment: boyfriend        The following portions of the patient's history were reviewed and updated as appropriate: allergies, current medications, past family history, past medical history, past social history, past  surgical history and problem list.    Review of Systems      This is positive for vaginal itching and swelling.  It is positive for vaginal pain.  It is positive for vulvar itching and pain.  It is positive for vaginal discharge.  Negative for fever or chills.  Negative for nausea or vomiting.    Physical Exam  Vitals and nursing note reviewed.   Constitutional:       Appearance: Normal appearance.   Abdominal:      General: There is no distension.      Palpations: Abdomen is soft.      Tenderness: There is no abdominal tenderness.   Genitourinary:     Comments: There is erythema of the labia minora bilaterally.  There is a white vaginal discharge.  No blood in the vault.  The cervix is normal in appearance.  It is not tender to palpation.  The uterus is mobile within the pelvis.  It is normal in size.  It is not tender.  The adnexa are not tender or enlarged.  Neurological:      Mental Status: She is alert and oriented to person, place, and time.         Assessment    Diagnoses and all orders for this visit:    1. Candidiasis (Primary)  -     NuSwab VG+ - Swab, Vagina    Other orders  -     nystatin-triamcinolone (MYCOLOG II) 024184-6.1 UNIT/GM-% cream; Apply 1 application topically to the appropriate area as directed 2 (Two) Times a Day for 7 days.  Dispense: 14 g; Refill: 0        Plan  1. Recurrent vulvovaginitis.  By physical examination, this is most consistent with Candida.  It is also possible that there is an allergic component, as the patient sanitary products have recently changed and her symptoms worsen after her period each month.  I counseled the patient and answered her questions.  In the short-term, I recommend Diflucan plus Mycolog cream for the external inflammation.  Also, cultures were performed.  In the longer term, the patient will continue to treat her prediabetes as well as to use probiotics.  She will stop douching and use rePHresh to assist in establishing a healthier vaginal environment.   Also, she will obtain hypoallergenic sanitary products.  I counseled the patient and answered her questions.  She agrees with these recommendations.    2. No follow-ups on file.    Social History     Tobacco Use   Smoking Status Former Smoker   • Packs/day: 0.50   • Types: Cigarettes   • Quit date: 2020   • Years since quittin.9   Smokeless Tobacco Never Used   Tobacco Comment    previously smoked less than 1/2 pack per day for 3 years   3.

## 2021-12-15 ENCOUNTER — TELEPHONE (OUTPATIENT)
Dept: OBSTETRICS AND GYNECOLOGY | Facility: CLINIC | Age: 36
End: 2021-12-15

## 2021-12-15 RX ORDER — FLUCONAZOLE 150 MG/1
150 TABLET ORAL ONCE
Qty: 1 TABLET | Refills: 0 | Status: SHIPPED | OUTPATIENT
Start: 2021-12-15 | End: 2021-12-15

## 2021-12-15 NOTE — TELEPHONE ENCOUNTER
I have reviewed the chart and do not see the Diflucan.  For this reason, I have sent it again.  Thank you.

## 2021-12-15 NOTE — TELEPHONE ENCOUNTER
Patient calling she said she was seen yesterday and she thought that she was having two prescriptions sent in to pharmacy but she only received one. I asked her what medication was missing and she said that she thought she was getting something called in for yeast infection but I do not see that we sent anything. She wanted to see if it still can be sent today or if maybe we were just waiting for results of her swab to come back first before sending in medication?

## 2021-12-16 LAB
A VAGINAE DNA VAG QL NAA+PROBE: NORMAL SCORE
BVAB2 DNA VAG QL NAA+PROBE: NORMAL SCORE
C ALBICANS DNA VAG QL NAA+PROBE: NEGATIVE
C GLABRATA DNA VAG QL NAA+PROBE: NEGATIVE
C TRACH DNA VAG QL NAA+PROBE: NEGATIVE
MEGA1 DNA VAG QL NAA+PROBE: NORMAL SCORE
N GONORRHOEA DNA VAG QL NAA+PROBE: NEGATIVE
T VAGINALIS DNA VAG QL NAA+PROBE: NEGATIVE

## 2022-01-25 ENCOUNTER — INITIAL PRENATAL (OUTPATIENT)
Dept: OBSTETRICS AND GYNECOLOGY | Facility: CLINIC | Age: 37
End: 2022-01-25

## 2022-01-25 VITALS — SYSTOLIC BLOOD PRESSURE: 116 MMHG | BODY MASS INDEX: 30.18 KG/M2 | DIASTOLIC BLOOD PRESSURE: 64 MMHG | WEIGHT: 187 LBS

## 2022-01-25 DIAGNOSIS — Z34.91 INITIAL OBSTETRIC VISIT IN FIRST TRIMESTER: Primary | ICD-10-CM

## 2022-01-25 LAB
B-HCG UR QL: POSITIVE
EXPIRATION DATE: ABNORMAL
INTERNAL NEGATIVE CONTROL: ABNORMAL
INTERNAL POSITIVE CONTROL: ABNORMAL
Lab: ABNORMAL

## 2022-01-25 PROCEDURE — 0501F PRENATAL FLOW SHEET: CPT | Performed by: OBSTETRICS & GYNECOLOGY

## 2022-01-25 PROCEDURE — 81025 URINE PREGNANCY TEST: CPT | Performed by: OBSTETRICS & GYNECOLOGY

## 2022-01-25 NOTE — PROGRESS NOTES
CC: Initial obstetric visit    HPI: 36-year-old  1 para 0 presents at an uncertain gestational age for her initial obstetric visit.  She believes her last menstrual period was either  or .  She is feeling well.  She has minimal nausea.  No abdominal or pelvic pain.  No vaginal bleeding.  Her course is complicated by possible diabetes.  The patient had a hemoglobin A1c of 6.9.  She has been taking Metformin and has been doing well.    Assessment and plan    1.  Intrauterine pregnancy at approximately 6 weeks gestational age by pelvic examination.  I recommend an ultrasound to confirm estimated gestational age and the patient agrees.  2.  Pregnancy related counseling was undertaken and questions were answered.  3.  Possible diabetes.  Counseled and questions answered.  Hemoglobin A1c has been added to the patient's prenatal labs.  Also, she will continue her Metformin.  She will follow up with Corazon in the near future to discuss glycemic monitoring and nutritional recommendations.  4.  Advanced maternal age.  Counseled and questions answered.  We discussed genetic screening.  We discussed invasive versus noninvasive testing.  I answered the patient's questions.  She would like to consider her options and will decide in the near future.  5.  Counseled regarding ACOG recommendations for the COVID-19 vaccine in pregnancy.  I answered the patient's questions.  She is considering this.  6.  Counseled regarding a [facility] recommendations for the influenza vaccine in pregnancy.  The patient is also considering this.

## 2022-01-26 LAB
ABO GROUP BLD: NORMAL
BACTERIA UR CULT: NO GROWTH
BACTERIA UR CULT: NORMAL
BASOPHILS # BLD AUTO: 0 X10E3/UL (ref 0–0.2)
BASOPHILS NFR BLD AUTO: 0 %
BLD GP AB SCN SERPL QL: NEGATIVE
EOSINOPHIL # BLD AUTO: 0.1 X10E3/UL (ref 0–0.4)
EOSINOPHIL NFR BLD AUTO: 1 %
ERYTHROCYTE [DISTWIDTH] IN BLOOD BY AUTOMATED COUNT: 11.8 % (ref 11.7–15.4)
HBA1C MFR BLD: 6.4 % (ref 4.8–5.6)
HBV SURFACE AG SERPL QL IA: NEGATIVE
HCT VFR BLD AUTO: 38.4 % (ref 34–46.6)
HCV AB S/CO SERPL IA: 0.1 S/CO RATIO (ref 0–0.9)
HGB BLD-MCNC: 13.3 G/DL (ref 11.1–15.9)
HIV 1+2 AB+HIV1 P24 AG SERPL QL IA: NON REACTIVE
IMM GRANULOCYTES # BLD AUTO: 0 X10E3/UL (ref 0–0.1)
IMM GRANULOCYTES NFR BLD AUTO: 0 %
LYMPHOCYTES # BLD AUTO: 2.2 X10E3/UL (ref 0.7–3.1)
LYMPHOCYTES NFR BLD AUTO: 30 %
MCH RBC QN AUTO: 31.8 PG (ref 26.6–33)
MCHC RBC AUTO-ENTMCNC: 34.6 G/DL (ref 31.5–35.7)
MCV RBC AUTO: 92 FL (ref 79–97)
MONOCYTES # BLD AUTO: 0.4 X10E3/UL (ref 0.1–0.9)
MONOCYTES NFR BLD AUTO: 6 %
NEUTROPHILS # BLD AUTO: 4.6 X10E3/UL (ref 1.4–7)
NEUTROPHILS NFR BLD AUTO: 63 %
PLATELET # BLD AUTO: 365 X10E3/UL (ref 150–450)
RBC # BLD AUTO: 4.18 X10E6/UL (ref 3.77–5.28)
RH BLD: POSITIVE
RPR SER QL: NON REACTIVE
RUBV IGG SERPL IA-ACNC: 8.22 INDEX
WBC # BLD AUTO: 7.2 X10E3/UL (ref 3.4–10.8)

## 2022-02-01 ENCOUNTER — ROUTINE PRENATAL (OUTPATIENT)
Dept: OBSTETRICS AND GYNECOLOGY | Facility: CLINIC | Age: 37
End: 2022-02-01

## 2022-02-01 VITALS — WEIGHT: 187 LBS | BODY MASS INDEX: 30.18 KG/M2 | DIASTOLIC BLOOD PRESSURE: 78 MMHG | SYSTOLIC BLOOD PRESSURE: 126 MMHG

## 2022-02-01 DIAGNOSIS — O24.111 PRE-EXISTING TYPE 2 DIABETES MELLITUS DURING PREGNANCY IN FIRST TRIMESTER: ICD-10-CM

## 2022-02-01 DIAGNOSIS — O20.9 VAGINAL BLEEDING IN PREGNANCY, FIRST TRIMESTER: ICD-10-CM

## 2022-02-01 DIAGNOSIS — Z3A.11 11 WEEKS GESTATION OF PREGNANCY: Primary | ICD-10-CM

## 2022-02-01 LAB
GLUCOSE UR STRIP-MCNC: NEGATIVE MG/DL
PROT UR STRIP-MCNC: NEGATIVE MG/DL

## 2022-02-01 PROCEDURE — 99215 OFFICE O/P EST HI 40 MIN: CPT | Performed by: NURSE PRACTITIONER

## 2022-02-01 RX ORDER — LANCETS 30 GAUGE
1 EACH MISCELLANEOUS 4 TIMES DAILY
Qty: 100 EACH | Refills: 1 | Status: SHIPPED | OUTPATIENT
Start: 2022-02-01

## 2022-02-01 NOTE — PROGRESS NOTES
Chief Complaint   Patient presents with   • Routine Prenatal Visit     Diabetic education     OB follow up     Neli Bravo is a 36 y.o.  11w2d being seen today for her obstetrical visit.  Patient reports nausea. Fetal movement: too early. She had light spotting over the weekend, this lasted for less than 24 hours, however she had additional spotting yesterday. Denies any recent IC.     She is here today for education on the management of diabetes in pregnancy. Newly found elevated A1C 6.9& 2 months ago. Most recent A1c 6.4%. She is taking metformin, but states she has not officially been dx with T2D.  Family hx: MGM has T2D  Prepregnancy wt 187. BMI 30    Review of Systems  Cramping/contractions : denies  Vaginal bleeding: spotting    /78   Wt 84.8 kg (187 lb)   LMP 2021   BMI 30.18 kg/m²     Assessment/Plan  Diagnoses and all orders for this visit:    1. 11 weeks gestation of pregnancy (Primary)    2. Vaginal bleeding in pregnancy, first trimester  -     Cancel: US Non-ob Transvaginal  -     US Ob Transvaginal    3. Pre-existing type 2 diabetes mellitus during pregnancy in first trimester    Other orders  -     Blood Glucose Monitoring Suppl w/Device kit; 1 each 1 (One) Time for 1 dose.  Dispense: 1 each; Refill: 0  -     glucose blood test strip; Take blood sugar fasting (AM) and 2 hours after start of breakfast, lunch and dinner  Dispense: 100 each; Refill: 12  -     Lancets misc; 1 each 4 (Four) Times a Day.  Dispense: 100 each; Refill: 1      TVUS completed to further evaluate vaginal spotting.   Single IUP with +FHB, anterior fibroids noted   Call with any heavy vaginal bleeding or if no improvement in bleeding    Counseled on dietary modifications and restrictions, risks of hyperglycemia on the pregnancy and for , risks for complications of uncontrolled type 2 diabetes, discussed frequency of accuchecks. Reviewed goals of fasting 60 - 95 and 2 hour postprandial< 120 throughout  pregnancy, S&S of hypoglycemia, corrective action for hypoglycemia, sick day management, fetal kick counts, and exercise. Provided with resources and glucose log. Reviewed use of glucometer. All questions answered appropriately. Encouraged to call office with any further questions. Education material given. Instructed to call the office for glucose >180     [email or web address]    Disc importance of good glycemic control in pregnancy. Disc risks associated with poorly or uncontrolled glucose including but not limited to excessive birth weight,  birth, lung immaturity, polyhydramnios,  hypoglycemia, stillbirth, etc.      Pt will send blood glucose log to me weekly through [employer] or via telephone.    Reviewed this stage of pregnancy  Problem list updated   Follow up in 3 weeks with Dr Schilling    I have spent 45 minutes in care of this patient, this includes face to face time, time spent reviewing the medical history and chart, time spent reviewing labs, times spent ordering labs and medications, and time spent documenting.      Corazon Hawley, APRN  2022  10:15 EST

## 2022-02-08 ENCOUNTER — TELEPHONE (OUTPATIENT)
Dept: OBSTETRICS AND GYNECOLOGY | Facility: CLINIC | Age: 37
End: 2022-02-08

## 2022-02-08 ENCOUNTER — OFFICE VISIT (OUTPATIENT)
Dept: FAMILY MEDICINE CLINIC | Facility: CLINIC | Age: 37
End: 2022-02-08

## 2022-02-08 ENCOUNTER — DOCUMENTATION (OUTPATIENT)
Dept: OBSTETRICS AND GYNECOLOGY | Facility: CLINIC | Age: 37
End: 2022-02-08

## 2022-02-08 VITALS
HEIGHT: 66 IN | WEIGHT: 187.2 LBS | SYSTOLIC BLOOD PRESSURE: 120 MMHG | HEART RATE: 80 BPM | TEMPERATURE: 97.8 F | BODY MASS INDEX: 30.08 KG/M2 | OXYGEN SATURATION: 96 % | DIASTOLIC BLOOD PRESSURE: 80 MMHG

## 2022-02-08 DIAGNOSIS — J30.9 ALLERGIC RHINITIS, UNSPECIFIED SEASONALITY, UNSPECIFIED TRIGGER: ICD-10-CM

## 2022-02-08 DIAGNOSIS — E11.9 TYPE 2 DIABETES MELLITUS WITHOUT COMPLICATION, WITHOUT LONG-TERM CURRENT USE OF INSULIN: Primary | ICD-10-CM

## 2022-02-08 DIAGNOSIS — R12 HEARTBURN: ICD-10-CM

## 2022-02-08 PROCEDURE — 99214 OFFICE O/P EST MOD 30 MIN: CPT | Performed by: NURSE PRACTITIONER

## 2022-02-08 RX ORDER — METFORMIN HYDROCHLORIDE 500 MG/1
500 TABLET, EXTENDED RELEASE ORAL
Qty: 90 TABLET | Refills: 1 | Status: SHIPPED | OUTPATIENT
Start: 2022-02-08 | End: 2022-09-16 | Stop reason: HOSPADM

## 2022-02-08 NOTE — PROGRESS NOTES
Neli Bravo  12w2d sent me her blood glucose log today via [employer]. Pt reports she is testing gluocse 1 1/2 hour after meals and not 2 hours after meals.    Blood Glucose log is as follows:  2/2/22 119, 129, 123, 122  2/3/22 128, 113, 109, 120  2/4/22 132, 109, 188, 129  2/5/22 135,  96, 106, 167  2/6/22 117, 193, 140, 130  2/7/22 123, 147, 142, 143  2/8/22 113, 174      Plan/Assessment  1) T2D, A1, second trimester    Glucose not well controlled with diet  7/7 FBS above goal  >50% postprandial results above goal, however PP are not reliable as pt has been testing sooner than 2 hours after meals. If she were testing 1 hour after meals goal would be <140, which would still be >50% of results above goal  Will further discuss with Dr Schilling. Pt will likely require insulin during pregnancy  Encouraged to test glucose 2 hours after start of meals  Continue to monitor glucose 4 times a day  Send blood glucose log to me again in one week  Instructed to call sooner if any questions or concerns      Corazon Hawley, APRN  2/8/2022  15:11 EST

## 2022-02-08 NOTE — PROGRESS NOTES
Subjective   Neli Bravo is a 36 y.o. female.     Chief Complaint   Patient presents with   • Diabetes     blood work        History of Present Illness   Patient presents for follow up DM2: started Metformin daily for A1c 6.9%; no problems with medication; had started losing weight and appetite had improved; has been watching intake of carbs/sugars; currently about 7 weeks pregnant; saw GYN and had vaginal US to determine gestation; had A1c 2 weeks ago and 6.4%; met with APRN at GYN office regarding diabetes during pregnancy; has been monitoring blood sugar for last week; blood sugar has been running 110-120s fasting in morning and 110-140s 2 hours after meals; goal is fasting less than 90 and less than 120 2 hours after meals; trying to watch carbs per meal; struggling with meal ideas; trying to eat fruit for snacks; makes sure gets protein in diet; some exercise, does a lot of walking with job; works at [employer] and told to avoid lifting; has follow up with GYN on 2/25/22; plans to schedule eye exam.    F/U heartburn: has not been taking Omeprazole for about 4 weeks since when thought pregnant; has had nausea with pregnancy; no recent heartburn.    No longer taking Spironolactone for acne.    The following portions of the patient's history were reviewed and updated as appropriate: allergies, current medications, past family history, past medical history, past social history, past surgical history and problem list.      Current Outpatient Medications   Medication Sig Dispense Refill   • acyclovir (ZOVIRAX) 5 % cream Apply as needed for herpes outbreak. 2 g 3   • glucose blood test strip Take blood sugar fasting (AM) and 2 hours after start of breakfast, lunch and dinner 100 each 12   • Lancets misc 1 each 4 (Four) Times a Day. 100 each 1   • metFORMIN ER (GLUCOPHAGE-XR) 500 MG 24 hr tablet Take 1 tablet by mouth Daily With Dinner. 90 tablet 1     No current facility-administered medications for this visit.        Past Medical History:   Diagnosis Date   • Acne    • Allergic rhinitis    • Anxiety 2021   • Migraine headache    • Positive depression screening 2021       Past Surgical History:   Procedure Laterality Date   • WISDOM TOOTH EXTRACTION         Family History   Problem Relation Age of Onset   • Heart disease Mother         has AICD due to ventricular arrhythmia   • Arrhythmia Mother    • Breast cancer Paternal Grandmother 70   • Diabetes Maternal Grandmother    • Heart attack Maternal Grandmother         in her 40s   • Hypertension Maternal Grandfather        Social History     Socioeconomic History   • Marital status: Single   Tobacco Use   • Smoking status: Former Smoker     Packs/day: 0.50     Types: Cigarettes     Quit date: 2020     Years since quittin.0   • Smokeless tobacco: Never Used   • Tobacco comment: previously smoked less than 1/2 pack per day for 3 years   Substance and Sexual Activity   • Alcohol use: Yes     Comment: occasional   • Drug use: No   • Sexual activity: Yes     Partners: Male     Birth control/protection: Pill     Comment: boyfriend        Review of Systems   Constitutional: Positive for fatigue (feels tired at end of active work day). Negative for appetite change, chills, fever, unexpected weight gain and unexpected weight loss.   HENT: Negative for ear pain, sinus pressure, sore throat and trouble swallowing. Postnasal drip: some at times. Rhinorrhea: has stuffy nose in mornings; has not been taking Claritin recently.    Eyes: Negative for blurred vision.   Respiratory: Negative for cough, chest tightness and shortness of breath.    Cardiovascular: Negative for chest pain, palpitations and leg swelling.   Gastrointestinal: Positive for constipation (has started stool softener and has helped). Negative for abdominal pain (some about 2 weeks ago, had some spotting, saw GYN and WNL), blood in stool, diarrhea, vomiting and GERD. Nausea: see HPI.   Genitourinary:  "Negative for dysuria and frequency (some, but has been drinking a lot of water ).   Musculoskeletal: Negative for arthralgias. Back pain: mild in lower back; no radiation of pain down legs.   Skin: Negative for rash.   Neurological: Negative for weakness and light-headedness. Dizziness: mild when bending over yesterday. Headache: has had some headaches recently; may be related to change in weather.   Hematological: Bruises/bleeds easily: no change in easy bruising.   Psychiatric/Behavioral: Negative for depressed mood. The patient is not nervous/anxious.        Objective   Vitals:    02/08/22 0954   BP: 120/80   BP Location: Left arm   Patient Position: Sitting   Cuff Size: Adult   Pulse: 80   Temp: 97.8 °F (36.6 °C)   SpO2: 96%   Weight: 84.9 kg (187 lb 3.2 oz)   Height: 167.6 cm (66\")     Body mass index is 30.21 kg/m².    Physical Exam  Vitals and nursing note reviewed.   Constitutional:       General: She is not in acute distress.     Appearance: She is well-developed. She is not diaphoretic.   HENT:      Head: Normocephalic.      Right Ear: Tympanic membrane and external ear normal.      Left Ear: Tympanic membrane and external ear normal.      Nose: Nose normal.      Right Sinus: No maxillary sinus tenderness or frontal sinus tenderness.      Left Sinus: No maxillary sinus tenderness or frontal sinus tenderness.      Mouth/Throat:      Mouth: Mucous membranes are moist.      Pharynx: No oropharyngeal exudate or posterior oropharyngeal erythema.   Eyes:      Conjunctiva/sclera: Conjunctivae normal.   Cardiovascular:      Rate and Rhythm: Normal rate and regular rhythm.      Pulses:           Dorsalis pedis pulses are 1+ on the right side and 1+ on the left side.        Posterior tibial pulses are 2+ on the right side and 2+ on the left side.      Heart sounds: Normal heart sounds. No murmur heard.      Pulmonary:      Effort: Pulmonary effort is normal. No respiratory distress.      Breath sounds: Normal breath " sounds.   Abdominal:      General: Bowel sounds are normal.      Palpations: Abdomen is soft. There is no hepatomegaly or splenomegaly.      Tenderness: There is no abdominal tenderness.   Musculoskeletal:      Cervical back: Normal range of motion and neck supple.      Right lower leg: No edema.      Left lower leg: No edema.   Feet:      Right foot:      Protective Sensation: 10 sites tested. 10 sites sensed.      Skin integrity: Skin integrity normal.      Left foot:      Protective Sensation: 10 sites tested. 10 sites sensed.      Skin integrity: Skin integrity normal.      Comments: Diabetic Foot Exam Performed and Monofilament Test Performed    Skin:     General: Skin is warm and dry.   Neurological:      Mental Status: She is alert and oriented to person, place, and time.   Psychiatric:         Mood and Affect: Mood normal.         Behavior: Behavior normal.         Thought Content: Thought content normal.         Cognition and Memory: Cognition normal.         Judgment: Judgment normal.         Lab Results   Component Value Date    WBC 7.2 01/25/2022    RBC 4.18 01/25/2022    HGB 13.3 01/25/2022    HCT 38.4 01/25/2022    MCV 92 01/25/2022    MCH 31.8 01/25/2022    MCHC 34.6 01/25/2022    RDW 11.8 01/25/2022    RDWSD 38.7 12/06/2019    MPV 8.8 12/06/2019     01/25/2022    NEUTRORELPCT 63 01/25/2022    LYMPHORELPCT 30 01/25/2022    MONORELPCT 6 01/25/2022    EOSRELPCT 1 01/25/2022    BASORELPCT 0 01/25/2022    AUTOIGPER 0.4 12/06/2019    NEUTROABS 4.6 01/25/2022    LYMPHSABS 2.2 01/25/2022    MONOSABS 0.4 01/25/2022    EOSABS 0.1 01/25/2022    BASOSABS 0.0 01/25/2022    AUTOIGNUM 0.04 12/06/2019    NRBC 0.0 01/12/2021     Lab Results   Component Value Date    GLUCOSE 131 (H) 11/10/2021    BUN 10 11/10/2021    CREATININE 0.77 11/10/2021    EGFRIFNONA 101 11/10/2021    EGFRIFAFRI 117 11/10/2021    BCR 13 11/10/2021    K 4.9 11/10/2021    CO2 23 11/10/2021    CALCIUM 9.9 11/10/2021    PROTENTOTREF 7.1  01/12/2021    ALBUMIN 4.70 01/12/2021    LABIL2 2.0 01/12/2021    AST 21 01/12/2021    ALT 23 01/12/2021      Lab Results   Component Value Date    CHLPL 180 01/12/2021    TRIG 118 01/12/2021    HDL 71 (H) 01/12/2021    VLDL 21 01/12/2021    LDL 88 01/12/2021     Lab Results   Component Value Date    HGBA1C 6.4 (H) 01/25/2022         Assessment/Plan .  Problem List Items Addressed This Visit     Heartburn    Current Assessment & Plan     Stable off Omeprazole.  May try Pepcid if needed for heartburn.         Allergic rhinitis    Current Assessment & Plan     Stable off Claritin.  Will consider resuming Claritin daily due to typically problems with allergies during Spring and has had some headaches recently.         Type 2 diabetes mellitus without complication, without long-term current use of insulin (HCC) - Primary    Current Assessment & Plan     Diabetes is improving with treatment.   Continue current treatment regimen.  Regular aerobic exercise.  Reminded to get yearly retinal exam.  Diabetes will be reassessed in 6 months.  Continue Metformin daily.  Continue to monitor your blood sugar as instructed.  Continue to watch intake of carbs/sugars in diet.  Follow up as scheduled with GYN.         Relevant Medications    metFORMIN ER (GLUCOPHAGE-XR) 500 MG 24 hr tablet          Return in about 6 months (around 8/8/2022) for Recheck.or sooner if problems or concerns.  Last A1c improved with Metformin daily; pt has guidelines per GYN regarding goals for blood sugar; patient has upcoming labs with GYN; instructed to call back if no additional labs and will check CMP.  Handouts given and reviewed regarding diabetes and meal planning/carb counting.       I spent 30 minutes caring for Neli on this date of service. This time includes time spent by me in the following activities:reviewing tests, obtaining and/or reviewing a separately obtained history, performing a medically appropriate examination and/or evaluation ,  counseling and educating the patient/family/caregiver, ordering medications, tests, or procedures and documenting information in the medical record    COVID-19 Precautions - Patient was compliant in wearing a mask. When I saw the patient, I used appropriate personal protective equipment (PPE) including mask, gloves, and eye shield (standard procedure).  Hand hygiene was completed before and after seeing the patient.

## 2022-02-08 NOTE — ASSESSMENT & PLAN NOTE
Diabetes is improving with treatment.   Continue current treatment regimen.  Regular aerobic exercise.  Reminded to get yearly retinal exam.  Diabetes will be reassessed in 6 months.  Continue Metformin daily.  Continue to monitor your blood sugar as instructed.  Continue to watch intake of carbs/sugars in diet.  Follow up as scheduled with GYN.

## 2022-02-08 NOTE — ASSESSMENT & PLAN NOTE
Stable off Claritin.  Will consider resuming Claritin daily due to typically problems with allergies during Spring and has had some headaches recently.

## 2022-02-08 NOTE — PATIENT INSTRUCTIONS
May try Pepcid if needed for heartburn.  Resume Claritin daily.  Continue to monitor your blood sugar and record results.  Continue to work on healthy diet and exercise.  Follow up pending lab results.  Follow up in 6 months, or sooner if problems or concerns.  Follow up as scheduled with GYN.   Schedule an eye exam.

## 2022-02-08 NOTE — TELEPHONE ENCOUNTER
I called Neli Bravo to review glucose results. No answer, left VM to return my call.    Corazon Hawley, APRN  2/8/22  4:10pm

## 2022-02-09 ENCOUNTER — TELEPHONE (OUTPATIENT)
Dept: OBSTETRICS AND GYNECOLOGY | Facility: CLINIC | Age: 37
End: 2022-02-09

## 2022-02-09 RX ORDER — PEN NEEDLE, DIABETIC 32GX 5/32"
1 NEEDLE, DISPOSABLE MISCELLANEOUS 2 TIMES DAILY
Qty: 90 EACH | Refills: 3 | Status: SHIPPED | OUTPATIENT
Start: 2022-02-09 | End: 2022-08-18 | Stop reason: SDUPTHER

## 2022-02-09 RX ORDER — DEXTROSE 5 G
15 TABLET,CHEWABLE ORAL AS NEEDED
Qty: 50 TABLET | Refills: 6 | Status: SHIPPED | OUTPATIENT
Start: 2022-02-09 | End: 2022-09-16 | Stop reason: HOSPADM

## 2022-02-09 NOTE — TELEPHONE ENCOUNTER
I called Neli Bravo to discuss glucose results. Discussed indications to begin insulin. Discussed uses, side effects, and how to inject insulin. Will have pt come to office for education if pens are not covered.    Corazon Hawley, APRN  2/9/22  4:34pm

## 2022-02-10 ENCOUNTER — TELEPHONE (OUTPATIENT)
Dept: OBSTETRICS AND GYNECOLOGY | Facility: CLINIC | Age: 37
End: 2022-02-10

## 2022-02-10 NOTE — TELEPHONE ENCOUNTER
There is no alternative unfortunately. I'm not sure what the cost was for the prescription, but there are over the counter glucose tablets available for under $5.00 at most pharmacies. Aryan

## 2022-02-10 NOTE — TELEPHONE ENCOUNTER
Good afternoon,     Patient is wondering if there is an alternative to Rx - glucose (B-D GLUCOSE) 5 g chewable tablet [815]    If possible. Her insurance does not cover this.    Please advise,   Thank you

## 2022-02-16 ENCOUNTER — DOCUMENTATION (OUTPATIENT)
Dept: OBSTETRICS AND GYNECOLOGY | Facility: CLINIC | Age: 37
End: 2022-02-16

## 2022-02-16 NOTE — PROGRESS NOTES
Neli Bravo  13w3d sent me her blood glucose log today via [employer]. Insulin was prescribed 2/9/22, however she was unable to start this 2/14/22.     Blood Glucose log is as follows:  2/9/22   105, 130, 151, 131  2/10/22 111,  98, 167, 154  2/11/22 117, 84,   -,    -  2/12/22 121, 119, 100, 150  2/13/22 130, 100, 120, 131  2/14/22 110,  97, 88, 191 -Insulin started   2/15/22  88, 129, 102, 103      Plan/Assessment  1) T2D, A2, second trimester    6/7 FBS above goal  6/6 PP dinner above goal  Glucose not well controlled with current dose of insulin, however, insulin was just started 2 days ago, prefer to increased every 3-4 days until at goal  Will have pt send glucose again in 2 day and will make insulin adjustments if indicated  Continue to monitor glucose 4 times a day  Send blood glucose log to me again in one week  Instructed to call sooner if any questions or concerns    Corazon Hawley, APRN  2/16/2022  11:12 EST

## 2022-02-18 ENCOUNTER — DOCUMENTATION (OUTPATIENT)
Dept: OBSTETRICS AND GYNECOLOGY | Facility: CLINIC | Age: 37
End: 2022-02-18

## 2022-02-18 NOTE — PROGRESS NOTES
Neli Bravo  13w5d sent me her blood glucose log today via [employer]. She started insulin 2/14/22. Glucose log was sent in 2/15/22 she has sent today for short term follow for insulin adjustment.     Blood Glucose log is as follows:    2/15/22 88, 129, 102, 103  2/16/22 92, 128, 94, 83  2/17/22 107, 82, 83, 107  2/18/22 99      Plan/Assessment  1) GDM, A2, second trimester    3/4 FBS, postprandial dinner results are improving  Will increase HS insulin to 11 units  Continue to monitor glucose 4 times a day  Send blood glucose log to me again in one week  Instructed to call sooner if any questions or concerns    Corazon Hawley, APRN  2/18/2022  12:32 EST

## 2022-02-25 ENCOUNTER — ROUTINE PRENATAL (OUTPATIENT)
Dept: OBSTETRICS AND GYNECOLOGY | Facility: CLINIC | Age: 37
End: 2022-02-25

## 2022-02-25 VITALS — SYSTOLIC BLOOD PRESSURE: 122 MMHG | BODY MASS INDEX: 30.21 KG/M2 | WEIGHT: 187.2 LBS | DIASTOLIC BLOOD PRESSURE: 78 MMHG

## 2022-02-25 DIAGNOSIS — Z3A.14 14 WEEKS GESTATION OF PREGNANCY: Primary | ICD-10-CM

## 2022-02-25 DIAGNOSIS — O24.112 PRE-EXISTING TYPE 2 DIABETES MELLITUS DURING PREGNANCY IN SECOND TRIMESTER: ICD-10-CM

## 2022-02-25 LAB
GLUCOSE UR STRIP-MCNC: NEGATIVE MG/DL
PROT UR STRIP-MCNC: NEGATIVE MG/DL

## 2022-02-25 PROCEDURE — 0502F SUBSEQUENT PRENATAL CARE: CPT | Performed by: OBSTETRICS & GYNECOLOGY

## 2022-02-25 NOTE — PROGRESS NOTES
CC: Obstetric visit    36-year-old  1 para 0 presents at 14-5/7 weeks for her obstetric visit.  Overall, she is feeling well.  She was evaluated by Corazon and changed from Metformin to insulin.  Most of her fasting sugars are 95 or less and most of her postprandials are under 100.    Assessment and plan    1.  Intrauterine pregnancy at 14-5/7 weeks  2.  Type 2 diabetes.  This is now insulin requiring.  The patient is adjusting to her insulin regimen and her sugars are mostly in range.  Counseled.  3.  Counseled regarding genetic screening.  The patient plans to proceed with cystic fibrosis carrier testing and free fetal DNA testing today.  4.  Counseled regarding the COVID-19 vaccine.  The patient plans to schedule this in the near future.

## 2022-03-01 ENCOUNTER — DOCUMENTATION (OUTPATIENT)
Dept: OBSTETRICS AND GYNECOLOGY | Facility: CLINIC | Age: 37
End: 2022-03-01

## 2022-03-01 NOTE — PROGRESS NOTES
Neli Bravo  10w3d sent me her blood glucose log today via [employer].    Blood Glucose log is as follows:  2/21/22 124, 83, 75, 103  2/22/22  98,  95, 92, 122  2/23/22 102, 73, 190,83  2/24/22 88,  67,  73, -  2/25/22 83, 141, 73, 83  2/26/22 89,  83, 113, 110  2/27/22 93,  93, 103, -  3/1/22   88      Plan/Assessment  1) T2D, A2, first trimester    4/8 FBS above goal  Will increase HS NPH to 14 units  Continue to monitor glucose 4 times a day  Send blood glucose log to me again in one week  Instructed to call sooner if any questions or concerns    Update sent to Dr. Shakir Hawley, APRN  3/1/2022  09:44 EST

## 2022-03-08 ENCOUNTER — DOCUMENTATION (OUTPATIENT)
Dept: OBSTETRICS AND GYNECOLOGY | Facility: CLINIC | Age: 37
End: 2022-03-08

## 2022-03-08 NOTE — PROGRESS NOTES
Neli Bravo  11w3d sent me her blood glucose log today via [employer].    Blood Glucose log is as follows:  3/1/22  88, 128, 123, 73  3/2/22  85,  86, 79, 82  3/3/22  86,  93, 96, 98  3/4/22 100, 119, 120, 84  3/5/22  86,  77, 84, 96  3/6/22  84,  64, 97, 96  3/7/22  97,  89, 81, 133  3/8/22  87,  76      Plan/Assessment  1) T2D, A2, first trimester    Glucose well controlled with current dose  Continue current dose of NPH insulin  Continue to monitor glucose 4 times a day  Send blood glucose log to me again in one week  Instructed to call sooner if any questions or concerns    Update sent to Dr. Shakir Hawley, APRN  3/8/2022  11:36 EST

## 2022-03-16 ENCOUNTER — DOCUMENTATION (OUTPATIENT)
Dept: OBSTETRICS AND GYNECOLOGY | Facility: CLINIC | Age: 37
End: 2022-03-16

## 2022-03-16 NOTE — PROGRESS NOTES
Neli Bravo  12w4d sent me her blood glucose log today via [employer].    Blood Glucose log is as follows:  3/9/22  86, 120, 72, 86  3/10/22 93, 88, 88, 103  3/11/22 83, 132, -,   66  3/12/22 81, 83, 101, 120  3/13/22 94, 82, 102, 100  3/14/22 88, 73, 116, 100  3/15/22 80, 146, 73, 52  3/16/22 83       Plan/Assessment  1) T2D, A2, second trimester    Glucose is well controlled with current insulin dosing  Continue NPH 15 units AM, 14 units PM  Two hypoglycemic results noted, reviewed Rule of 15 correction  Continue to monitor glucose 4 times a day  Send blood glucose log to me again in one week  Instructed to call sooner if any questions or concerns    Update sent to Dr. Shakir Hawley, APRN  3/16/2022  19:48 EDT

## 2022-03-22 ENCOUNTER — ROUTINE PRENATAL (OUTPATIENT)
Dept: OBSTETRICS AND GYNECOLOGY | Facility: CLINIC | Age: 37
End: 2022-03-22

## 2022-03-22 VITALS — WEIGHT: 185.6 LBS | DIASTOLIC BLOOD PRESSURE: 68 MMHG | BODY MASS INDEX: 29.96 KG/M2 | SYSTOLIC BLOOD PRESSURE: 124 MMHG

## 2022-03-22 DIAGNOSIS — Z3A.13 13 WEEKS GESTATION OF PREGNANCY: Primary | ICD-10-CM

## 2022-03-22 PROCEDURE — 0502F SUBSEQUENT PRENATAL CARE: CPT | Performed by: OBSTETRICS & GYNECOLOGY

## 2022-03-22 NOTE — PROGRESS NOTES
CC: Obstetric visit    HPI: 36-year-old  1 para 0 presents at 13-3/7 weeks for her obstetric visit.  Sugars have improved after starting insulin.  The patient reports no fasting sugars out of range since last week.  Only one postprandial sugars out of range.  She does have a vulvar lesion that she would like to have it evaluated.  Otherwise, she is feeling well.    Physical examination    The abdomen is soft and gravid.  There is no fundal tenderness.  No suprapubic tenderness.  Pelvic examination reveals normal female external genitalia.  There is an abrasion at the inferior portion of the labia minora.  No additional lesions.  This does not appear vesicular.    Assessment and plan    1.  Intrauterine pregnancy at 13-3/7 weeks  2.  Noninvasive prenatal screening tests today.  3.  Type 2 diabetes, now requiring insulin.  Glycemic control is improving.  The patient continues to work on this.  4.  Vulvar lesion.  The patient believes this may be irritation from her underwear or jeans.  It does not appear vesicular/herpetic.  We discussed further work-up and the patient declines this.  She will contact me if the lesion does not resolve in the next 24 hours.  History of genital herpes.  Counseled and questions answered.  The patient has an outbreak approximately once yearly.  We discussed the use of suppressive Valtrex starting at 36 weeks.

## 2022-03-24 ENCOUNTER — DOCUMENTATION (OUTPATIENT)
Dept: OBSTETRICS AND GYNECOLOGY | Facility: CLINIC | Age: 37
End: 2022-03-24

## 2022-03-24 NOTE — PROGRESS NOTES
Neli Bravo  13w5d sent me her blood glucose log today via [employer].    Blood Glucose log is as follows:  3/16/22 83, -, -, 91  3/17/22 91, 71, 69, 93  3/18/22 83, 80, 99, 111  3/19/22 85, 100, 95, 111  3/20/22 91, 77, 91, 110  3/21/22 98, 95, 79, 140  3/22/22 92, 93, -, -         Plan/Assessment  1) T2D, A1, second trimester    Glucose well controlled with diet  Continue to monitor glucose 4 times a day  Send blood glucose log to me again in one week  Instructed to call sooner if any questions or concerns    Update sent to Dr. Shakir Hawley, APRN  3/24/2022  09:56 EDT

## 2022-03-27 LAB
CFDNA.FET/CFDNA.TOTAL SFR FETUS: NORMAL %
CITATION REF LAB TEST: NORMAL
FET 13+18+21+X+Y ANEUP PLAS.CFDNA: NEGATIVE
FET CHR 21 TS PLAS.CFDNA QL: NEGATIVE
FET SEX PLAS.CFDNA DOSAGE CFDNA: NORMAL
FET TS 13 RISK PLAS.CFDNA QL: NEGATIVE
FET TS 18 RISK WBC.DNA+CFDNA QL: NEGATIVE
GA EST FROM CONCEPTION DATE: NORMAL D
GESTATIONAL AGE > 9:: YES
LAB DIRECTOR NAME PROVIDER: NORMAL
LAB DIRECTOR NAME PROVIDER: NORMAL
LABORATORY COMMENT REPORT: NORMAL
LIMITATIONS OF THE TEST: NORMAL
NEGATIVE PREDICTIVE VALUE: NORMAL
NOTE: NORMAL
PERFORMANCE CHARACTERISTICS: NORMAL
POSITIVE PREDICTIVE VALUE: NORMAL
REF LAB TEST METHOD: NORMAL
TEST PERFORMANCE INFO SPEC: NORMAL

## 2022-03-30 LAB
CFTR MUT ANL BLD/T: NORMAL
LABORATORY COMMENT REPORT: NORMAL

## 2022-04-01 ENCOUNTER — DOCUMENTATION (OUTPATIENT)
Dept: OBSTETRICS AND GYNECOLOGY | Facility: CLINIC | Age: 37
End: 2022-04-01

## 2022-04-01 NOTE — PROGRESS NOTES
Neli Bravo  14w6d sent me her blood glucose log today via [employer].    Blood Glucose log is as follows:  3/24/22 78,  70, 93,  100  3/25/22 88, 101, 110, 120  3/26/22 78,  98, 153, -  3/27/22 89, 100, 111, 100  3/28/22 86, 101, 98,  100  3/29/22 81,  68, 113, -  3/30/22 78,  62, 122, 80      Plan/Assessment  1) T2D, A2, second trimester    Glucose well controlled with rare elevated PP noted  2 hypoglycemic results noted  Continue current dose of insulin  Continue to monitor glucose 4 times a day  Send blood glucose log to me again in one week  Instructed to call sooner if any questions or concerns    Update sent to Dr. Shakir Hawley, APRN  4/1/2022  10:55 EDT

## 2022-04-12 ENCOUNTER — DOCUMENTATION (OUTPATIENT)
Dept: OBSTETRICS AND GYNECOLOGY | Facility: CLINIC | Age: 37
End: 2022-04-12

## 2022-04-12 NOTE — PROGRESS NOTES
Neli Bravo  16w3d sent me her blood glucose log today via [employer].    Blood Glucose log is as follows:  4/5/22   82, 135, 77, 83  4/6/22   97, 78, 150, -  4/7/22   91, 74,  -,    99  4/8/22   73, 66, 115, -  4/9/22   85, 82, 172, -  4/10/22 78, 55, 85, -  4/11/22 77, 85, 75, 116  4/12/22 76, 119      Plan/Assessment  1) T2D, A2, second trimester    Glucose well controlled with current dose of insulin  Continue current dose  2 hypoglycemic results noted  Several missing results noted  Encouraged to test glucose 4 times a day  Send blood glucose log to me again in one week  Instructed to call sooner if any questions or concerns    Update sent to Dr. Shakir Hawley, APRN  4/12/2022  11:40 EDT

## 2022-04-19 ENCOUNTER — ROUTINE PRENATAL (OUTPATIENT)
Dept: OBSTETRICS AND GYNECOLOGY | Facility: CLINIC | Age: 37
End: 2022-04-19

## 2022-04-19 VITALS — SYSTOLIC BLOOD PRESSURE: 118 MMHG | WEIGHT: 186.6 LBS | DIASTOLIC BLOOD PRESSURE: 70 MMHG | BODY MASS INDEX: 30.12 KG/M2

## 2022-04-19 DIAGNOSIS — Z34.92 SECOND TRIMESTER PREGNANCY: Primary | ICD-10-CM

## 2022-04-19 LAB
GLUCOSE UR STRIP-MCNC: NEGATIVE MG/DL
PROT UR STRIP-MCNC: NEGATIVE MG/DL

## 2022-04-19 PROCEDURE — 0502F SUBSEQUENT PRENATAL CARE: CPT | Performed by: OBSTETRICS & GYNECOLOGY

## 2022-04-19 NOTE — PROGRESS NOTES
CC: Obstetric visit    HPI: 36-year-old  1 para 0 presents at 17-3/7 weeks for her obstetric visit.  She reports excellent glycemic control.  She had one postprandial value which was elevated, but it was Easter.  The patient ate extra carbs that day.  She has begun to experience fetal movement.    Assessment and plan    1.  Intrauterine pregnancy at 17-3/7 weeks  2.  Diabetes.  The patient is doing well with her insulin regimen.  3.  Cystic fibrosis carrier status is negative.  Free fetal DNA testing is negative.  Counseled regarding AFP testing.  The patient would like to proceed.  4.  Screening ultrasound at the next visit.

## 2022-04-25 ENCOUNTER — DOCUMENTATION (OUTPATIENT)
Dept: OBSTETRICS AND GYNECOLOGY | Facility: CLINIC | Age: 37
End: 2022-04-25

## 2022-04-25 NOTE — PROGRESS NOTES
Neli Bravo  18w2d sent me her blood glucose log today via [employer].    Blood Glucose log is as follows:  4/18/22 102, 101, 111, 117  4/19/22  93, 104, 104, 135  4/20/22  82, 90,  154, 115  4/21/22  76, 62,  144, 124  4/22/22  79, 89,  122, -  4/23/22  72,  -,     95, 114  4/24/22  97, 114, 136, 133  4/25/22  89      Plan/Assessment  1) T2D, A2, second trimester    3/7 FBS above goal  4/7 PP lunch above goal   3/7 PP dinner above goal  One hypoglycemic result noted   Glucose not well controlled, will increase NPH insulin  New dosage is as follows  NPH 17 units AM, 16 units PM  Continue to monitor glucose 4 times a day  Send blood glucose log to me again in one week  Instructed to call sooner if any questions or concerns    Update sent to Dr. Shakir Hawley, APRN  4/25/2022  08:17 EDT

## 2022-05-10 ENCOUNTER — DOCUMENTATION (OUTPATIENT)
Dept: OBSTETRICS AND GYNECOLOGY | Facility: CLINIC | Age: 37
End: 2022-05-10

## 2022-05-10 NOTE — PROGRESS NOTES
Neli Bravo  20w3d sent me her blood glucose log today via [employer].    Blood Glucose log is as follows:  5/3/22  85, 107, 78, 84  5/4/22  74,   87, 74, 140  5/5/22  69,   85, 76, 100  5/6/22  81,   55, 158, 132  5/7/22  86,   73, 109, 101  5/8/22  101, 155, 95, 93  5/9/22  86,   98, 105, 98  5/10/22 82,   72      Plan/Assessment  1) GDM, A2, second trimester    Glucose is well controlled with current insulin dose  Continue to monitor glucose 4 times a day  Send blood glucose log to me again in one week  Instructed to call sooner if any questions or concerns    Update sent to Dr. Shakir Hawley, APRN  5/10/2022  15:28 EDT

## 2022-05-17 ENCOUNTER — ROUTINE PRENATAL (OUTPATIENT)
Dept: OBSTETRICS AND GYNECOLOGY | Facility: CLINIC | Age: 37
End: 2022-05-17

## 2022-05-17 VITALS — WEIGHT: 186.6 LBS | DIASTOLIC BLOOD PRESSURE: 68 MMHG | BODY MASS INDEX: 30.12 KG/M2 | SYSTOLIC BLOOD PRESSURE: 120 MMHG

## 2022-05-17 DIAGNOSIS — O24.414 INSULIN CONTROLLED GESTATIONAL DIABETES MELLITUS (GDM) IN SECOND TRIMESTER: ICD-10-CM

## 2022-05-17 DIAGNOSIS — Z3A.21 21 WEEKS GESTATION OF PREGNANCY: Primary | ICD-10-CM

## 2022-05-17 LAB
GLUCOSE UR STRIP-MCNC: NEGATIVE MG/DL
PROT UR STRIP-MCNC: NEGATIVE MG/DL

## 2022-05-17 PROCEDURE — 0502F SUBSEQUENT PRENATAL CARE: CPT | Performed by: OBSTETRICS & GYNECOLOGY

## 2022-05-17 NOTE — PROGRESS NOTES
CC: Obstetric visit    HPI: 36-year-old  1 para 0 presents at 21-3/7 weeks for her obstetric visit.  Her sugars are mostly well controlled.  She has 1 elevated postprandial weekly.  She is tolerating her insulin well.  Does feel fetal movement occasionally.    Assessment and plan    1.  Intrauterine pregnancy at 21-3/7 weeks  2.  Counseled regarding prenatal screening.  The patient did not do her AFP test last visit.  She will do it today.  She understands that this is the margin of availability of this test.  3.  Diabetes.  Counseled and questions answered.  The patient is tolerating her insulin regimen.  She reports that her sugars are well controlled.  She has 1 elevated postprandial weekly.  Fastings have all been normal.  4.  Screening ultrasound was reviewed and discussed with the patient.  This is a normal screening ultrasound.  Cardiac views were not adequately obtained.  This will be repeated next month.

## 2022-05-19 LAB
AFP INTERP SERPL-IMP: NORMAL
AFP INTERP SERPL-IMP: NORMAL
AFP MOM SERPL: 1.65
AFP SERPL-MCNC: 82.3 NG/ML
AGE AT DELIVERY: 35.3 YR
GA METHOD: NORMAL
GA: 21.3 WEEKS
IDDM PATIENT QL: YES
LABORATORY COMMENT REPORT: NORMAL
MULTIPLE PREGNANCY: NO
NEURAL TUBE DEFECT RISK FETUS: 552 %
RESULT: NORMAL

## 2022-05-23 ENCOUNTER — DOCUMENTATION (OUTPATIENT)
Dept: OBSTETRICS AND GYNECOLOGY | Facility: CLINIC | Age: 37
End: 2022-05-23

## 2022-05-23 NOTE — PROGRESS NOTES
Neli Bravo  22w2d sent me her blood glucose log today via [employer].    Blood Glucose log is as follows:  5/14/22  77,  76,   -,    -  5/15/22  92,  98,  104, -  5/16/22 104, 115, 117, 113  5/17/22  95,  75,  135, 89  5/18/22  76, 138,  80, 99  5/19/22  60, 124, 122, 138  5/20/22  73,  93,   65, 141      Plan/Assessment  1) T2D, A2, second trimester    Glucose is well controlled with current insulin dose  Continue to monitor glucose 4 times a day  Send blood glucose log to me again in one week  Instructed to call sooner if any questions or concerns    Update sent to Dr. Shakir Hawley, APRN  5/23/2022  10:25 EDT

## 2022-05-31 ENCOUNTER — DOCUMENTATION (OUTPATIENT)
Dept: OBSTETRICS AND GYNECOLOGY | Facility: CLINIC | Age: 37
End: 2022-05-31

## 2022-05-31 NOTE — PROGRESS NOTES
Neli Bravo  23w3d sent me her blood glucose log today via [employer].    Blood Glucose log is as follows:  5/24/22 92, 96, 125, 122  5/25/22 76, 65, 95,    -  5/26/22 79, 76, 120, 96  5/27/22 80, 56, 82, 73  5/28/22 73, 92,  -,  120  5/29/22 85, 99, 88, 155  5/30/22 85, 91, 154, 110  5/31/22 78, 98      Plan/Assessment  1) T2D, A2, second trimester    Glucose is well controlled with current dose of insulin  Continue current dose   Continue to monitor glucose 4 times a day  Send blood glucose log to me again in one week  Instructed to call sooner if any questions or concerns    Update sent to Dr. Shakir Hawley, APRN  5/31/2022  15:39 EDT

## 2022-06-14 ENCOUNTER — DOCUMENTATION (OUTPATIENT)
Dept: OBSTETRICS AND GYNECOLOGY | Facility: CLINIC | Age: 37
End: 2022-06-14

## 2022-06-14 ENCOUNTER — ROUTINE PRENATAL (OUTPATIENT)
Dept: OBSTETRICS AND GYNECOLOGY | Facility: CLINIC | Age: 37
End: 2022-06-14

## 2022-06-14 VITALS — WEIGHT: 189 LBS | BODY MASS INDEX: 30.51 KG/M2 | DIASTOLIC BLOOD PRESSURE: 70 MMHG | SYSTOLIC BLOOD PRESSURE: 111 MMHG

## 2022-06-14 DIAGNOSIS — Z3A.25 25 WEEKS GESTATION OF PREGNANCY: Primary | ICD-10-CM

## 2022-06-14 DIAGNOSIS — O24.414 INSULIN CONTROLLED GESTATIONAL DIABETES MELLITUS (GDM) IN SECOND TRIMESTER: ICD-10-CM

## 2022-06-14 LAB
GLUCOSE UR STRIP-MCNC: NEGATIVE MG/DL
PROT UR STRIP-MCNC: NEGATIVE MG/DL

## 2022-06-14 PROCEDURE — 0502F SUBSEQUENT PRENATAL CARE: CPT | Performed by: OBSTETRICS & GYNECOLOGY

## 2022-06-14 NOTE — PROGRESS NOTES
CC: Obstetric visit    HPI: 36-year-old  1 para 0 presents at 25-3/7 weeks for her obstetric visit.  Her baby is moving actively.  She will experience lightheadedness if she lays on her back for a long period of time.  Otherwise, she is feeling well.    Assessment and plan    1.  Intrauterine pregnancy at 25-3/7 weeks  2.  Gestational diabetes, requiring insulin.  Sugars were reviewed and discussed with the patient.  Mostly, they are in the range.  The patient had a few out of range sugars at the time of her birthday celebration.  3.  Ultrasound was reviewed and discussed with the patient.  Cardiac views are normal.  4.  Counseled regarding strategies to minimize lightheadedness and minimize compression of the vena cava while resting.

## 2022-06-14 NOTE — PROGRESS NOTES
Neli Bravo  25w3d sent me her blood glucose log today via [employer].    Blood Glucose log is as follows:  6/7/22   95, 79, 103, -  6/8/22   82, 103, 56, 166  6/9/22   77, 85,  170, 75  6/10/22 84, 104, -,  -  6/11/22 79, 88,   69, -  6/12/22 80, 104, 105, 105  6/13/22 109, 122, 85, 114      Plan/Assessment  1) T2D, A2, second trimester    Glucose is well controlled with current insulin dose  2/7 FBS above goal, rare PP above goal, rare hypoglycemic result noted  Continue to monitor glucose 4 times a day  Send blood glucose log to me again in one week  Instructed to call sooner if any questions or concerns    Update sent to Dr. Shakir Hawley, APRN  6/14/2022  12:38 EDT

## 2022-06-20 ENCOUNTER — TELEPHONE (OUTPATIENT)
Dept: OBSTETRICS AND GYNECOLOGY | Facility: CLINIC | Age: 37
End: 2022-06-20

## 2022-06-20 NOTE — TELEPHONE ENCOUNTER
I haven't sent any prescriptions on this pt since February. Does she need a refill for 90 day supply?

## 2022-06-20 NOTE — TELEPHONE ENCOUNTER
Patient called stating insulin script is incorrect, it is not a 90 day supply. She would like script resent to pharmacy.     I have updated her pharmacy.    Thanks

## 2022-06-27 ENCOUNTER — DOCUMENTATION (OUTPATIENT)
Dept: OBSTETRICS AND GYNECOLOGY | Facility: CLINIC | Age: 37
End: 2022-06-27

## 2022-06-27 NOTE — PROGRESS NOTES
Neli Bravo  27w2d sent me her blood glucose log today via [employer].    Blood Glucose log is as follows:  6/20/22 102, 82, 87, 127  6/21/22 83, 111, 117, 143  6/22/22 66, 86, 88, 54  6/23/22 76, 88, 88, 109  6/24/22 73, 79, 126, -  6/25/22 83, 76, 86, -  6/26/22 87, 103, 70, 110  6/27/22 77, 124    Plan/Assessment  1) GDM, A2, second trimester    Glucose well controlled with current dose of insulin  Continue to monitor glucose 4 times a day  Send blood glucose log to me again in one week  Instructed to call sooner if any questions or concerns    Update sent to Dr. Shakir Hawley, APRN  6/27/2022  12:20 EDT

## 2022-07-05 ENCOUNTER — ROUTINE PRENATAL (OUTPATIENT)
Dept: OBSTETRICS AND GYNECOLOGY | Facility: CLINIC | Age: 37
End: 2022-07-05

## 2022-07-05 VITALS — DIASTOLIC BLOOD PRESSURE: 62 MMHG | SYSTOLIC BLOOD PRESSURE: 116 MMHG | WEIGHT: 188.2 LBS | BODY MASS INDEX: 30.38 KG/M2

## 2022-07-05 DIAGNOSIS — Z79.4 PRE-EXISTING INSULIN TREATED DIABETES MELLITUS DURING PREGNANCY: Primary | ICD-10-CM

## 2022-07-05 DIAGNOSIS — O24.319 PRE-EXISTING INSULIN TREATED DIABETES MELLITUS DURING PREGNANCY: Primary | ICD-10-CM

## 2022-07-05 LAB
GLUCOSE UR STRIP-MCNC: NEGATIVE MG/DL
PROT UR STRIP-MCNC: NEGATIVE MG/DL

## 2022-07-05 PROCEDURE — 0502F SUBSEQUENT PRENATAL CARE: CPT | Performed by: OBSTETRICS & GYNECOLOGY

## 2022-07-05 NOTE — PROGRESS NOTES
CC: Obstetric visit    HPI: 36-year-old  1 para 0 presents at 28-3/7 weeks for her obstetric visit.  She is feeling well.  Does feel fetal movement.  No longer is experiencing lightheadedness.    Assessment and plan    1.  Intrauterine pregnancy at 28-3/7 weeks  2.  Diabetes requiring insulin.  Counseled and questions answered.  The patient reports that she now has all of her fasting and postprandial sugars in range.  She is satisfied on her current insulin regimen.  We will begin biophysical profiles at the next visit at 31 to 32 weeks.  3.  Anterior placenta.  Sometimes, the patient has difficulty appreciating fetal movement.  We discussed kick counts.  The patient will call for any failed kick counts.

## 2022-07-06 ENCOUNTER — DOCUMENTATION (OUTPATIENT)
Dept: OBSTETRICS AND GYNECOLOGY | Facility: CLINIC | Age: 37
End: 2022-07-06

## 2022-07-06 NOTE — PROGRESS NOTES
Neli Bravo  28w4d sent me her blood glucose log today via [employer].    Blood Glucose log is as follows:  6/29/22 85, 89, 127, -  6/30/22 87, 70, 100, 110  7/1/22  88, 95, 84, 164  7/2/22  86, 94, 79, 116  7/3/22  87, 97, 100, 118  7/4/22  90, 114, -,    120  7/5/22  91, 169, 75, 108  7/6/22  95, 140      Plan/Assessment  1) T2D, A2, third trimester    Glucose is well controlled with current dose of insulin  Continue current dosing  Continue to monitor glucose 4 times a day  Send blood glucose log to me again in one week  Instructed to call sooner if any questions or concerns    Update sent to Dr. Shakir Hawley, APRN  7/6/2022  12:50 EDT

## 2022-07-14 ENCOUNTER — DOCUMENTATION (OUTPATIENT)
Dept: OBSTETRICS AND GYNECOLOGY | Facility: CLINIC | Age: 37
End: 2022-07-14

## 2022-07-14 NOTE — PROGRESS NOTES
Neli Bravo  29w5d sent me her blood glucose log today via [email or web address].    Blood Glucose log is as follows:  7/6/22  95, 140, 150,  -  7/7/22  91, 122, 80,  81  7/8/22  90,  80,  87, 117  7/9/22  71,  86,  143,  -  7/10/22 88, 105, 74,  -  7/11/22 85,    -,   97,  131  7/12/22 95,    -,   68,   -  7/13/22 115, 130, 140, 140  7/14/22  89,  160      Plan/Assessment  1) T2D, A2, third trimester    Glucose is not well controlled with current insulin dosing  Will increase NPH dosing, new dose: AM dose 20 units, PM dose 21 units  Continue to monitor glucose 4 times a day  Send blood glucose log to me again in one week  Instructed to call sooner if any questions or concerns    Update sent to Dr. Shakir Hawley, APRN  7/14/2022  12:15 EDT

## 2022-07-15 ENCOUNTER — OFFICE VISIT (OUTPATIENT)
Dept: FAMILY MEDICINE CLINIC | Facility: CLINIC | Age: 37
End: 2022-07-15

## 2022-07-15 VITALS
SYSTOLIC BLOOD PRESSURE: 100 MMHG | HEIGHT: 66 IN | OXYGEN SATURATION: 98 % | BODY MASS INDEX: 30.73 KG/M2 | TEMPERATURE: 98.4 F | DIASTOLIC BLOOD PRESSURE: 76 MMHG | HEART RATE: 101 BPM | WEIGHT: 191.2 LBS

## 2022-07-15 DIAGNOSIS — J01.90 ACUTE NON-RECURRENT SINUSITIS, UNSPECIFIED LOCATION: Primary | ICD-10-CM

## 2022-07-15 DIAGNOSIS — Z20.822 EXPOSURE TO COVID-19 VIRUS: ICD-10-CM

## 2022-07-15 DIAGNOSIS — J02.9 SORE THROAT: ICD-10-CM

## 2022-07-15 LAB
EXPIRATION DATE: NORMAL
INTERNAL CONTROL: NORMAL
Lab: NORMAL
S PYO AG THROAT QL: NEGATIVE

## 2022-07-15 PROCEDURE — 87880 STREP A ASSAY W/OPTIC: CPT | Performed by: NURSE PRACTITIONER

## 2022-07-15 PROCEDURE — 99213 OFFICE O/P EST LOW 20 MIN: CPT | Performed by: NURSE PRACTITIONER

## 2022-07-15 NOTE — PROGRESS NOTES
Subjective   Neli Bravo is a 37 y.o. female.     Chief Complaint   Patient presents with   • Sore Throat     X 1 night        History of Present Illness   Patient presents with c/o sore throat; symptoms started yesterday; has had negative home test for COVID-19; pt has been monitoring since was out of town last week and was exposed to COVID-19 about 2 days ago when rode home from [place] with person positive for COVID-19 on 7/13/22; no fever; has had productive cough; has had stuffy nose; some chest tightness, no SOA; hurts to swallow; some discomfort in left ear; some nausea, no vomiting or diarrhea; nausea may be related to blood sugars being off; adjusted insulin dosage yesterday and feels better today; currently 7 months pregnant; mild swelling in ankles with prolonged walking; no COVID-19 vaccine; no OTC treatment.      The following portions of the patient's history were reviewed and updated as appropriate: allergies, current medications, past family history, past medical history, past social history, past surgical history and problem list.    Current Outpatient Medications on File Prior to Visit   Medication Sig   • acyclovir (ZOVIRAX) 5 % cream Apply as needed for herpes outbreak.   • glucose (B-D GLUCOSE) 5 g chewable tablet Chew 3 tablets As Needed for Low Blood Sugar.   • glucose blood test strip Take blood sugar fasting (AM) and 2 hours after start of breakfast, lunch and dinner   • Insulin NPH, Human,, Isophane, (humuLIN N,novoLIN N) 100 UNIT/ML injection Inject 17 units under the skin with breakfast. Inject 16 units under the skin with dinner (Patient taking differently: Inject 20 units under the skin with breakfast. Inject 21 units under the skin with dinner)   • Insulin Pen Needle (BD Pen Needle Cee 2nd Gen) 32G X 4 MM misc 1 each 2 (Two) Times a Day.   • Lancets misc 1 each 4 (Four) Times a Day.   • metFORMIN ER (GLUCOPHAGE-XR) 500 MG 24 hr tablet Take 1 tablet by mouth Daily With Dinner.      No current facility-administered medications on file prior to visit.        Past Medical History:   Diagnosis Date   • Acne    • Allergic rhinitis    • Anxiety 2021   • Migraine headache    • Positive depression screening 2021       Past Surgical History:   Procedure Laterality Date   • WISDOM TOOTH EXTRACTION         Family History   Problem Relation Age of Onset   • Heart disease Mother         has AICD due to ventricular arrhythmia   • Arrhythmia Mother    • Breast cancer Paternal Grandmother 70   • Diabetes Maternal Grandmother    • Heart attack Maternal Grandmother         in her 40s   • Hypertension Maternal Grandfather        Social History     Socioeconomic History   • Marital status: Single   Tobacco Use   • Smoking status: Former Smoker     Packs/day: 0.50     Types: Cigarettes     Quit date: 2020     Years since quittin.5   • Smokeless tobacco: Never Used   • Tobacco comment: previously smoked less than 1/2 pack per day for 3 years   Substance and Sexual Activity   • Alcohol use: Yes     Comment: occasional   • Drug use: No   • Sexual activity: Yes     Partners: Male     Birth control/protection: Pill     Comment: boyfriend        Review of Systems   Constitutional: Positive for fatigue. Negative for appetite change, chills, fever, unexpected weight gain and unexpected weight loss.   HENT: Positive for postnasal drip. Negative for trouble swallowing. Sinus pressure: some under eyes.    Eyes: Negative for blurred vision and discharge.   Respiratory: Positive for cough (mild productive cough--cream color) and chest tightness (some). Negative for shortness of breath.    Cardiovascular: Negative for chest pain and palpitations.   Gastrointestinal: Negative for abdominal pain.   Musculoskeletal: Negative for back pain.   Skin: Negative for rash.   Neurological: Negative for dizziness and light-headedness. Headache: some in last couple of days.       Objective   Vitals:    07/15/22 1122  "  BP: 100/76   BP Location: Left arm   Patient Position: Sitting   Cuff Size: Adult   Pulse: 101   Temp: 98.4 °F (36.9 °C)   SpO2: 98%   Weight: 86.7 kg (191 lb 3.2 oz)   Height: 167.6 cm (66\")     Body mass index is 30.86 kg/m².    Physical Exam  Vitals and nursing note reviewed.   Constitutional:       General: She is not in acute distress.     Appearance: She is well-developed and well-groomed. She is not ill-appearing or diaphoretic.   HENT:      Head: Normocephalic.      Right Ear: External ear normal. No decreased hearing noted. Right ear middle ear effusion: mild. Tympanic membrane is not erythematous.      Left Ear: External ear normal. No decreased hearing noted. A middle ear effusion is present. Left ear injected TM: mild.      Nose: Nose normal.      Right Sinus: No maxillary sinus tenderness or frontal sinus tenderness.      Left Sinus: No maxillary sinus tenderness or frontal sinus tenderness.      Mouth/Throat:      Mouth: Mucous membranes are moist.      Pharynx: No oropharyngeal exudate (mild drainage in posterior pharynx). Posterior oropharyngeal erythema: mild.      Tonsils: 2+ on the right. 2+ on the left.   Eyes:      Conjunctiva/sclera: Conjunctivae normal.   Neck:      Vascular: No carotid bruit.   Cardiovascular:      Rate and Rhythm: Normal rate and regular rhythm.      Pulses: Normal pulses.      Heart sounds: Normal heart sounds. No murmur heard.  Pulmonary:      Effort: Pulmonary effort is normal. No respiratory distress.      Breath sounds: Normal breath sounds.   Abdominal:      General: Bowel sounds are normal.      Palpations: Abdomen is soft. There is no hepatomegaly or splenomegaly.      Tenderness: There is no abdominal tenderness. There is no guarding.      Comments: Gravid   Musculoskeletal:      Cervical back: Normal range of motion and neck supple.      Right lower leg: No edema.      Left lower leg: No edema.   Lymphadenopathy:      Cervical: Cervical adenopathy: tender approx " 0.5 cm anterior cervical lymph nodes.   Skin:     General: Skin is warm and dry.      Findings: No rash.   Neurological:      Mental Status: She is alert and oriented to person, place, and time.      Gait: Gait is intact.   Psychiatric:         Mood and Affect: Mood normal.         Behavior: Behavior normal.         Thought Content: Thought content normal.         Cognition and Memory: Cognition normal.         Judgment: Judgment normal.         Lab Results   Component Value Date    WBC 7.2 01/25/2022    RBC 4.18 01/25/2022    HGB 13.3 01/25/2022    HCT 38.4 01/25/2022    MCV 92 01/25/2022    MCH 31.8 01/25/2022    MCHC 34.6 01/25/2022    RDW 11.8 01/25/2022    RDWSD 38.7 12/06/2019    MPV 8.8 12/06/2019     01/25/2022    NEUTRORELPCT 63 01/25/2022    LYMPHORELPCT 30 01/25/2022    MONORELPCT 6 01/25/2022    EOSRELPCT 1 01/25/2022    BASORELPCT 0 01/25/2022    AUTOIGPER 0.4 12/06/2019    NEUTROABS 4.6 01/25/2022    LYMPHSABS 2.2 01/25/2022    MONOSABS 0.4 01/25/2022    EOSABS 0.1 01/25/2022    BASOSABS 0.0 01/25/2022    AUTOIGNUM 0.04 12/06/2019    NRBC 0.0 01/12/2021     Lab Results   Component Value Date    GLUCOSE 131 (H) 11/10/2021    BUN 10 11/10/2021    CREATININE 0.77 11/10/2021    EGFRIFNONA 101 11/10/2021    EGFRIFAFRI 117 11/10/2021    BCR 13 11/10/2021    K 4.9 11/10/2021    CO2 23 11/10/2021    CALCIUM 9.9 11/10/2021    PROTENTOTREF 7.1 01/12/2021    ALBUMIN 4.70 01/12/2021    LABIL2 2.0 01/12/2021    AST 21 01/12/2021    ALT 23 01/12/2021      Lab Results   Component Value Date    CHLPL 180 01/12/2021    TRIG 118 01/12/2021    HDL 71 (H) 01/12/2021    VLDL 21 01/12/2021    LDL 88 01/12/2021     Lab Results   Component Value Date    HGBA1C 6.4 (H) 01/25/2022           Assessment    Problem List Items Addressed This Visit     Exposure to COVID-19 virus    Current Assessment & Plan     Increase intake of clear liquids and rest.  Self-quarantine.           Relevant Orders    QUESTIONNAIRE SERIES  (Completed)    Sore throat    Current Assessment & Plan     Increase intake of clear liquids and rest.  Try warm salt water gargles.  Try plain Mucinex to thin secretions.           Relevant Orders    POCT rapid strep A (Completed)    COVID-19,[facility] ROUTINE, NP/OP SWAB IN TRANSPORT MEDIA OR ESWAB 72 HR TAT - Swab, Nasopharynx    Acute non-recurrent sinusitis - Primary    Current Assessment & Plan     Increase intake of clear liquids and rest.  Try plain Mucinex to thin secretions.  Try nasal steroid, such as Flonase or Nasacort.                 Return if symptoms worsen or fail to improve.  Instructed to quarantine for 10 days from last exposure to person with COVID-19 virus; discussed if negative for COVID-19, would recommend repeating COVID-19 test 5 days after exposure.       COVID-19 Precautions - Patient was compliant in wearing a mask. When I saw the patient, I used appropriate personal protective equipment (PPE) including N95 mask, gloves, and eye shield (standard procedure).  Hand hygiene was completed before and after seeing the patient.

## 2022-07-15 NOTE — PATIENT INSTRUCTIONS
Increase intake of clear liquids and rest.  Try warm salt water gargles.  Try plain Mucinex to thin secretions.  Try nasal steroid, such as Flonase or Nasacort.  Self-quarantine.  Follow up pending lab results.  Follow up if symptoms persist or worsen.

## 2022-07-16 PROBLEM — Z20.822 EXPOSURE TO COVID-19 VIRUS: Status: ACTIVE | Noted: 2022-07-16

## 2022-07-16 PROBLEM — J02.9 SORE THROAT: Status: ACTIVE | Noted: 2022-07-16

## 2022-07-16 PROBLEM — J01.90 ACUTE NON-RECURRENT SINUSITIS: Status: ACTIVE | Noted: 2022-07-16

## 2022-07-16 NOTE — ASSESSMENT & PLAN NOTE
Increase intake of clear liquids and rest.  Try warm salt water gargles.  Try plain Mucinex to thin secretions.

## 2022-07-16 NOTE — ASSESSMENT & PLAN NOTE
Increase intake of clear liquids and rest.  Try plain Mucinex to thin secretions.  Try nasal steroid, such as Flonase or Nasacort.

## 2022-07-17 LAB
LABCORP SARS-COV-2, NAA 2 DAY TAT: NORMAL
SARS-COV-2 RNA RESP QL NAA+PROBE: NOT DETECTED

## 2022-07-19 ENCOUNTER — TELEPHONE (OUTPATIENT)
Dept: FAMILY MEDICINE CLINIC | Facility: CLINIC | Age: 37
End: 2022-07-19

## 2022-07-19 PROBLEM — Z34.90 PREGNANCY: Status: ACTIVE | Noted: 2022-07-19

## 2022-07-19 PROBLEM — U07.1 COVID-19 VIRUS INFECTION: Status: ACTIVE | Noted: 2022-07-19

## 2022-07-19 NOTE — TELEPHONE ENCOUNTER
Called to check on patient; patient PCR test negative for COVID-19 on 715/22; patient continued to have symptoms and retested on 7/17/22 and was positive for COVID-19 virus; patient contacted GYN regarding COVID-19 positive during pregnancy; patient has cold like symptoms; no fever and no worsening of symptoms; to follow-up if symptoms persist or worsen.

## 2022-07-20 ENCOUNTER — DOCUMENTATION (OUTPATIENT)
Dept: OBSTETRICS AND GYNECOLOGY | Facility: CLINIC | Age: 37
End: 2022-07-20

## 2022-07-20 NOTE — PROGRESS NOTES
Attempt to return patient's call.  Left message.    Neli Bravo  30w4d sent me her blood glucose log today via [employer]. She reports she has been ill this week, she feels this has caused an increase in glucose results.     Blood Glucose log is as follows:  7/15/22  72, 118, 182, 109  7/16/22 101, 176, 88, 131  7/17/22  87, 88, 144, 151  7/18/22  78, 173, 91, 110  7/19/22  69, 103, 110, 155  7/20/22  80, 143      Plan/Assessment  1) T2D, A2, third trimester    FBS well controlled, trend in elevated PP with breakfast and dinner  Will increase AM NPH to 23 units and consider meal time humalog if this continues to be a trend  Continue to monitor glucose 4 times a day  Send blood glucose log to me again in one week  Instructed to call sooner if any questions or concerns    Update sent to Dr. Shakir Hawley, APRN  7/20/2022  13:47 EDT

## 2022-07-26 ENCOUNTER — TELEPHONE (OUTPATIENT)
Dept: OBSTETRICS AND GYNECOLOGY | Facility: CLINIC | Age: 37
End: 2022-07-26

## 2022-07-26 ENCOUNTER — ROUTINE PRENATAL (OUTPATIENT)
Dept: OBSTETRICS AND GYNECOLOGY | Facility: CLINIC | Age: 37
End: 2022-07-26

## 2022-07-26 VITALS — BODY MASS INDEX: 30.51 KG/M2 | WEIGHT: 189 LBS | SYSTOLIC BLOOD PRESSURE: 120 MMHG | DIASTOLIC BLOOD PRESSURE: 70 MMHG

## 2022-07-26 DIAGNOSIS — O24.414 INSULIN CONTROLLED GESTATIONAL DIABETES MELLITUS (GDM) IN THIRD TRIMESTER: ICD-10-CM

## 2022-07-26 DIAGNOSIS — Z3A.31 31 WEEKS GESTATION OF PREGNANCY: Primary | ICD-10-CM

## 2022-07-26 PROCEDURE — 0502F SUBSEQUENT PRENATAL CARE: CPT | Performed by: OBSTETRICS & GYNECOLOGY

## 2022-07-26 NOTE — TELEPHONE ENCOUNTER
Pt states she is returning provider's missed call, requesting a call back when available.     Thank you,   Mariposa

## 2022-07-26 NOTE — PROGRESS NOTES
CC: Obstetric visit    HPI: 36-year-old  1 para 0 presents at 31-3/7 weeks for her obstetric visit.  Her baby is moving actively.  Sugars are slightly elevated, but this mostly occurred during the patient's bout with COVID.  Her sugars are improving.    Assessment and plan    1.  Intrauterine pregnancy at 28-3/7 weeks  2.  Diabetes requiring insulin.  Sugars have been out of range, but they are improving.  3.  Recent COVID-19 infection.  The patient's symptoms were mild and she is totally recovered.  4.  Biophysical profile is 8 out of 8 with a normal amniotic fluid index and a normal fetal heart rate.  Counseled and questions answered.

## 2022-07-28 ENCOUNTER — DOCUMENTATION (OUTPATIENT)
Dept: OBSTETRICS AND GYNECOLOGY | Facility: CLINIC | Age: 37
End: 2022-07-28

## 2022-07-28 NOTE — PROGRESS NOTES
Neli Caos  31w5d sent me her blood glucose log today via [employer]    Blood Glucose log is as follows:  7/21/22 90, 115, 153, 141  7/22/22 79, 114, 116, 126  7/23/22 87, 81, 140, 100  7/24/22 90, 92, 120, 143  7/25/22 74, 94, 164, 101  7/26/22 76, 83, 120, 152  7/27/22 79, 81, 74      Plan/Assessment  1) T2D, A2, third trimester    FBS at goal  Trend in PP lunch and dinner above goal  Will increase AM NPH to 26 units  Continue to monitor glucose 4 times a day  Send blood glucose log to me again in one week  Instructed to call sooner if any questions or concerns    Update sent to Dr. Shakir Hawley, APRN  7/28/2022  12:44 EDT

## 2022-08-03 ENCOUNTER — DOCUMENTATION (OUTPATIENT)
Dept: OBSTETRICS AND GYNECOLOGY | Facility: CLINIC | Age: 37
End: 2022-08-03

## 2022-08-03 NOTE — PROGRESS NOTES
Neli Bravo  32w4d sent me her blood glucose log today via [employer].    Blood Glucose log is as follows:  7/27/22 79, 81, 74, 145  7/28/22 81, 90, 103, 114  7/29/22 80, 94, -,      77  7/30/22 76, 77, 148, 97  7/31/22 80, 128, 73, 100  8/1/22   77, 63, 83, 109  8/2/22   87, 74, 86, 104  8/3/22   75, 112      Plan/Assessment  1) T2D, A2, third trimester    Glucose is well controlled with current insulin dosing  Continue current dose  Continue to monitor glucose 4 times a day  Send blood glucose log to me again in one week  Instructed to call sooner if any questions or concerns    Update sent to Dr. Shakir Hawley, APRN  8/3/2022  10:38 EDT

## 2022-08-09 ENCOUNTER — ROUTINE PRENATAL (OUTPATIENT)
Dept: OBSTETRICS AND GYNECOLOGY | Facility: CLINIC | Age: 37
End: 2022-08-09

## 2022-08-09 VITALS — BODY MASS INDEX: 30.63 KG/M2 | SYSTOLIC BLOOD PRESSURE: 118 MMHG | WEIGHT: 189.8 LBS | DIASTOLIC BLOOD PRESSURE: 67 MMHG

## 2022-08-09 DIAGNOSIS — O28.8 AFI (AMNIOTIC FLUID INDEX) BORDERLINE LOW: ICD-10-CM

## 2022-08-09 DIAGNOSIS — O24.414 INSULIN CONTROLLED GESTATIONAL DIABETES MELLITUS (GDM) IN THIRD TRIMESTER: Primary | ICD-10-CM

## 2022-08-09 LAB
GLUCOSE UR STRIP-MCNC: NEGATIVE MG/DL
PROT UR STRIP-MCNC: NEGATIVE MG/DL

## 2022-08-09 PROCEDURE — 0502F SUBSEQUENT PRENATAL CARE: CPT | Performed by: OBSTETRICS & GYNECOLOGY

## 2022-08-09 NOTE — PROGRESS NOTES
CC: Obstetric visit    HPI: 36-year-old  1 para 0 presents at 33-3/7 weeks for her obstetric visit.  Her baby is moving actively.  She has some pelvic pressure, but no rhythmic contractions.    Assessment and plan    1.  Intrauterine pregnancy at 33-3/7 weeks  2.  Insulin requiring diabetes.  Counseled.  Sugars are now completely in range.  Continue current insulin dose.  3.  Biophysical profile is 8 out of 8, but amniotic fluid index is borderline low at 8.02.  I counseled the patient regarding the clinical significance of this and answered her questions.  She has consumed very little liquid this morning.  It is, however, concerning that she has a recent history of COVID-19 infection.  There has been some oligohydramnios associated with this.  I recommend aggressive hydration today followed by a repeat biophysical profile tomorrow.  This can be done as an outpatient.  I answered the patient's questions and she agrees with this recommendation.

## 2022-08-10 ENCOUNTER — DOCUMENTATION (OUTPATIENT)
Dept: OBSTETRICS AND GYNECOLOGY | Facility: CLINIC | Age: 37
End: 2022-08-10

## 2022-08-10 NOTE — PROGRESS NOTES
Neli Bravo  33w4d sent me her blood glucose log today via [employer].    Blood Glucose log is as follows:  8/3/22   75, 112,  -,    95  8/4/22   70, 98,   98,  153  8/5/22   75, 125, 143,  97  8/6/22   83, 102,    -    120  8/7/22   82, 99,    135,  -  8/8/22   90, 74,     77,  96  8/9/22   88, 113,  115, 118  8/10/22 100, 121      Plan/Assessment  1) T2D, A2, third trimester    Glucose is well controlled with current insulin dosing  Continue current insulin dosing  Continue to monitor glucose 4 times a day  Send blood glucose log to me again in one week  Instructed to call sooner if any questions or concerns    Update sent to Dr. Shakir Hawley, APRN  8/10/2022  09:47 EDT

## 2022-08-16 ENCOUNTER — ROUTINE PRENATAL (OUTPATIENT)
Dept: OBSTETRICS AND GYNECOLOGY | Facility: CLINIC | Age: 37
End: 2022-08-16

## 2022-08-16 VITALS — DIASTOLIC BLOOD PRESSURE: 67 MMHG | WEIGHT: 190.2 LBS | BODY MASS INDEX: 30.7 KG/M2 | SYSTOLIC BLOOD PRESSURE: 118 MMHG

## 2022-08-16 DIAGNOSIS — Z3A.34 34 WEEKS GESTATION OF PREGNANCY: Primary | ICD-10-CM

## 2022-08-16 DIAGNOSIS — O24.414 INSULIN CONTROLLED GESTATIONAL DIABETES MELLITUS (GDM) IN THIRD TRIMESTER: ICD-10-CM

## 2022-08-16 LAB
GLUCOSE UR STRIP-MCNC: NEGATIVE MG/DL
PROT UR STRIP-MCNC: NEGATIVE MG/DL

## 2022-08-16 PROCEDURE — 0502F SUBSEQUENT PRENATAL CARE: CPT | Performed by: OBSTETRICS & GYNECOLOGY

## 2022-08-16 NOTE — PROGRESS NOTES
CC: Obstetric visit    HPI: 36-year-old  1 para 0 presents at 34-3/7 weeks for her obstetric visit.  Her baby is moving actively.  She had elevated fasting sugars 3 days in a row running from 99-1 13.  She consulted with Corazon and the plan was to change her insulin regimen if the problem did not resolve.  This morning, fasting sugar is 89.    Assessment and plan    1.  Intrauterine pregnancy at 34-3/7 weeks  2.  Insulin requiring diabetes.  The patient had 3 days of elevated fasting sugars.  Postprandial sugars were mostly in range.  This morning, her fasting was 89.  If her sugars remain in range, insulin will likely remain the same.  The patient has followed up with Corazon Hawley regarding this.  Currently, we are managing expectantly.  If elevated sugars return, insulin doses will likely be increased.  3.  Biophysical profile is 8 out of 8.  Amniotic fluid index is normal.

## 2022-08-17 ENCOUNTER — DOCUMENTATION (OUTPATIENT)
Dept: OBSTETRICS AND GYNECOLOGY | Facility: CLINIC | Age: 37
End: 2022-08-17

## 2022-08-17 NOTE — PROGRESS NOTES
Neli Bravo  34w4d sent me her blood glucose log today via [employer].    Blood Glucose log is as follows:  8/11/22 96, 133, 85, 150  8/12/22 99, 90, 79, 94  8/13/22 104, 84, 92, 173  8/14/22 83, 64, 100, 94  8/15/22 113, 105, 111, 111  8/16/22 83, 139, 133, 132  8/17/22 79      Plan/Assessment  1) T2D, A2, third trimester    4/7 FBS above goal, 3/6 PP dinner above goal  Will increase NPH insulin.  New dosing: AM 29 units, PM 29 units  Continue to monitor glucose 4 times a day  Send blood glucose log to me again in one week  Instructed to call sooner if any questions or concerns    Update sent to Dr. Shakir Hawley, APRN  8/17/2022  19:35 EDT

## 2022-08-18 RX ORDER — PEN NEEDLE, DIABETIC 32GX 5/32"
1 NEEDLE, DISPOSABLE MISCELLANEOUS 2 TIMES DAILY
Qty: 90 EACH | Refills: 3 | Status: SHIPPED | OUTPATIENT
Start: 2022-08-18 | End: 2022-12-06

## 2022-08-25 ENCOUNTER — ROUTINE PRENATAL (OUTPATIENT)
Dept: OBSTETRICS AND GYNECOLOGY | Facility: CLINIC | Age: 37
End: 2022-08-25

## 2022-08-25 VITALS — SYSTOLIC BLOOD PRESSURE: 118 MMHG | WEIGHT: 191 LBS | BODY MASS INDEX: 30.83 KG/M2 | DIASTOLIC BLOOD PRESSURE: 67 MMHG

## 2022-08-25 DIAGNOSIS — O24.410 DIET CONTROLLED GESTATIONAL DIABETES MELLITUS (GDM) IN THIRD TRIMESTER: ICD-10-CM

## 2022-08-25 DIAGNOSIS — Z3A.35 35 WEEKS GESTATION OF PREGNANCY: Primary | ICD-10-CM

## 2022-08-25 PROCEDURE — 0502F SUBSEQUENT PRENATAL CARE: CPT | Performed by: OBSTETRICS & GYNECOLOGY

## 2022-08-25 NOTE — PROGRESS NOTES
CC: Obstetric visit    HPI: 36-year-old  1 para 0 presents at 35-5/7 weeks.  I reviewed her glycemic logs with her.  Sugars are in range.  The patient is tolerating her current insulin dose.  Biophysical profile is 8 out of 8 with a normal amniotic fluid index.  The baby is moving actively.  The patient has no complaints today.    Assessment and plan    1.  Intrauterine pregnancy at 35-5/7 weeks  2.  Insulin requiring diabetes.  Counseled and questions answered.  Glycemic control is adequate on the current regimen.  3.  Group B strep cultures next visit.  Counseled.  4.  Counseled regarding the remainder of the pregnancy.  The benefits and risks of an induction at 37 weeks were discussed with the patient.  She is considering her options and will decide at the next visit.

## 2022-08-31 ENCOUNTER — ROUTINE PRENATAL (OUTPATIENT)
Dept: OBSTETRICS AND GYNECOLOGY | Facility: CLINIC | Age: 37
End: 2022-08-31

## 2022-08-31 VITALS — BODY MASS INDEX: 31.15 KG/M2 | WEIGHT: 193 LBS | SYSTOLIC BLOOD PRESSURE: 115 MMHG | DIASTOLIC BLOOD PRESSURE: 75 MMHG

## 2022-08-31 DIAGNOSIS — Z3A.36 36 WEEKS GESTATION OF PREGNANCY: Primary | ICD-10-CM

## 2022-08-31 DIAGNOSIS — Z34.93 NORMAL PREGNANCY, THIRD TRIMESTER: ICD-10-CM

## 2022-08-31 DIAGNOSIS — O24.414 INSULIN CONTROLLED GESTATIONAL DIABETES MELLITUS (GDM) IN THIRD TRIMESTER: ICD-10-CM

## 2022-08-31 LAB
GLUCOSE UR STRIP-MCNC: NEGATIVE MG/DL
PROT UR STRIP-MCNC: NEGATIVE MG/DL

## 2022-08-31 PROCEDURE — 0502F SUBSEQUENT PRENATAL CARE: CPT | Performed by: OBSTETRICS & GYNECOLOGY

## 2022-08-31 NOTE — PROGRESS NOTES
CC: Obstetric visit    HPI: 36-year-old  1 para 0 who presents at 36-4/7 weeks for her obstetric visit.  Sugar control is excellent.  Biophysical profile today was 8 out of 10.  The baby is moving actively.  The patient is not leigh ann.    Assessment and plan    1.  Intrauterine pregnancy at 36-4/7 weeks  2.  Group B strep cultures performed.  Counseled.  3.  History of genital herpes.  Counseled.  The patient has already started suppressive Valtrex.  A prescription refill will be sent today.  Insulin requiring diabetes.  Counseled regarding remainder of the pregnancy.  The patient's blood sugars are very well controlled.  Biophysical profile today was 8 out of 10.  The benefits and risks of proceeding to an induction of labor at 37 weeks versus waiting until 38 were discussed with the patient.  We discussed the remote risk of stillbirth associated with insulin requiring diabetes.  We also discussed the increased risk of  with an unfavorable cervix.  The patient would like to avoid induction this coming week if at all possible.  Her grandmother has just passed and the  is in the middle of next week.  She would prefer that her baby could be born on a different week.  Given the patient's excellent glycemic control and reassuring tests of wellbeing, I feel that this is appropriate.  She will continue to perform kick counts and call for any decreased fetal movement.  Labor warnings and rupture membrane warnings given.  Otherwise, the patient will present next week for a visit and we will consider induction of labor at approximately 38 weeks.

## 2022-09-02 ENCOUNTER — DOCUMENTATION (OUTPATIENT)
Dept: OBSTETRICS AND GYNECOLOGY | Facility: CLINIC | Age: 37
End: 2022-09-02

## 2022-09-02 NOTE — PROGRESS NOTES
Neli Lawrence  36w6d sent me her blood glucose log today via [employer].    Blood Glucose log is as follows:  8/26/22 89, 55, 107, -  8/27/22 91, 137, 80, 133  8/28/22 80, 98, 135, 100  8/29/22 99, 94, 73, 118  8/30/22 89, 112, 62, 100  8/31/22 95, 81, 70, 88  9/1/22   89, 87, 104      Plan/Assessment  1) GDM, A2, third trimester    3/7 FBS above goal, will increase HS NPH 32 units  Few PP above  Continue to monitor glucose 4 times a day  Send blood glucose log to me again in one week  Instructed to call sooner if any questions or concerns    Update sent to Dr. Shakir Hawley, APRN  9/2/2022  10:11 EDT

## 2022-09-03 ENCOUNTER — TELEPHONE (OUTPATIENT)
Dept: OBSTETRICS AND GYNECOLOGY | Facility: CLINIC | Age: 37
End: 2022-09-03

## 2022-09-03 NOTE — TELEPHONE ENCOUNTER
Called service  Headache for several hours  Denied history of hypertension or other PIH symptoms   Trial of acetaminophen, rest and possible caffeine recommended     Jairo Shabazz MD  9/3/2022  07:28 EDT

## 2022-09-04 LAB — B-HEM STREP SPEC QL CULT: NEGATIVE

## 2022-09-06 ENCOUNTER — OFFICE VISIT (OUTPATIENT)
Dept: FAMILY MEDICINE CLINIC | Facility: CLINIC | Age: 37
End: 2022-09-06

## 2022-09-06 VITALS
DIASTOLIC BLOOD PRESSURE: 84 MMHG | OXYGEN SATURATION: 98 % | SYSTOLIC BLOOD PRESSURE: 124 MMHG | HEIGHT: 66 IN | TEMPERATURE: 98.2 F | WEIGHT: 198 LBS | BODY MASS INDEX: 31.82 KG/M2 | HEART RATE: 91 BPM

## 2022-09-06 DIAGNOSIS — E11.9 TYPE 2 DIABETES MELLITUS WITHOUT COMPLICATION, UNSPECIFIED WHETHER LONG TERM INSULIN USE: ICD-10-CM

## 2022-09-06 DIAGNOSIS — Z00.00 ENCOUNTER FOR ANNUAL PHYSICAL EXAM: Primary | ICD-10-CM

## 2022-09-06 DIAGNOSIS — O24.414 INSULIN CONTROLLED GESTATIONAL DIABETES MELLITUS (GDM) IN THIRD TRIMESTER: ICD-10-CM

## 2022-09-06 PROCEDURE — 99395 PREV VISIT EST AGE 18-39: CPT | Performed by: NURSE PRACTITIONER

## 2022-09-06 NOTE — PROGRESS NOTES
Subjective   Neli Bravo is a 37 y.o. female.     Chief Complaint   Patient presents with   • Annual Exam     6 month f/u       History of Present Illness   Patient presents for CPE with fasting labs; no new problems or concerns today; healthy diet; not much exercise recently since end of pregnancy, does a lot of walking at work; regular dental visits; no recent eye exam, no vision correction; no problems hearing; immunizations UTD; sees Dr. Schilling for female care; last PAP 11/2021; mammo never performed; paternal GM with family history of breast cancer, diagnosed around age 70 years; colonoscopy never performed; no family history of colon cancer.    Currently 37 weeks pregnant; will be getting induced next week.    F/U DM2: has not been taking Metformin daily; last A1c 6.4%; also takes Humulin N Insulin twice daily with meals; sees CAMILLE Navarrete with GYN that manages blood sugar; no recent labs; monitors blood sugars, typically run less than 95, and less than 120 after meals, some higher last couple of mornings before morning meal; not sure why running higher last couple of days; has increased insulin with evening meal; will be sending blood sugar readings to GYN today; occasional tingling of left arm, more so at night, resolves with moving around; sleeps on side.     The following portions of the patient's history were reviewed and updated as appropriate: allergies, current medications, past family history, past medical history, past social history, past surgical history and problem list.    Current Outpatient Medications on File Prior to Visit   Medication Sig   • acyclovir (ZOVIRAX) 5 % cream Apply as needed for herpes outbreak.   • glucose blood test strip Take blood sugar fasting (AM) and 2 hours after start of breakfast, lunch and dinner   • Insulin NPH, Human,, Isophane, (humuLIN N,novoLIN N) 100 UNIT/ML injection Inject 29 units under the skin with breakfast. Inject 29 units under the skin with dinner  (Patient taking differently: Inject 29 units under the skin with breakfast. Inject 32 units under the skin with dinner)   • Insulin Pen Needle (BD Pen Needle Cee 2nd Gen) 32G X 4 MM misc 1 each 2 (Two) Times a Day.   • Lancets misc 1 each 4 (Four) Times a Day.   • glucose (B-D GLUCOSE) 5 g chewable tablet Chew 3 tablets As Needed for Low Blood Sugar.   • metFORMIN ER (GLUCOPHAGE-XR) 500 MG 24 hr tablet Take 1 tablet by mouth Daily With Dinner.     No current facility-administered medications on file prior to visit.        Past Medical History:   Diagnosis Date   • Acne    • Allergic rhinitis    • Anxiety 2021   • Migraine headache    • Positive depression screening 2021       Past Surgical History:   Procedure Laterality Date   • WISDOM TOOTH EXTRACTION         Family History   Problem Relation Age of Onset   • Heart disease Mother         has AICD due to ventricular arrhythmia   • Arrhythmia Mother    • Breast cancer Paternal Grandmother 70   • Diabetes Maternal Grandmother    • Heart attack Maternal Grandmother         in her 40s   • Hypertension Maternal Grandfather        Social History     Socioeconomic History   • Marital status: Single   Tobacco Use   • Smoking status: Former Smoker     Packs/day: 0.50     Types: Cigarettes     Quit date: 2020     Years since quittin.6   • Smokeless tobacco: Never Used   • Tobacco comment: previously smoked less than 1/2 pack per day for 3 years   Substance and Sexual Activity   • Alcohol use: Not Currently     Comment: occasional   • Drug use: No   • Sexual activity: Yes     Partners: Male     Birth control/protection: Pill     Comment: boyfriend        Review of Systems   Constitutional: Positive for fatigue. Negative for appetite change, chills, fever, unexpected weight gain and unexpected weight loss.   HENT: Negative for ear pain, sinus pressure, sore throat and trouble swallowing.    Eyes: Negative for blurred vision.   Respiratory: Negative for  "cough, chest tightness and shortness of breath.    Cardiovascular: Negative for chest pain. Palpitations: random at times when baby is moving. Leg swelling: mild swelling in feet in last several days.   Gastrointestinal: Negative for abdominal pain, blood in stool, constipation, diarrhea and GERD. Indigestion: takes TUMs as needed and helps.   Endocrine: Negative for cold intolerance, heat intolerance and polydipsia (has been trying to drink plenty of liquids).   Genitourinary: Negative for dysuria and frequency.   Musculoskeletal: Back pain: some mild lower back pain, no radiation of pain down legs.   Skin: Negative for rash.   Neurological: Negative for dizziness, syncope and light-headedness. Headache: some on occasion, resolve with Tylenol; has had migraine in past.   Hematological: Does not bruise/bleed easily.   Psychiatric/Behavioral: Negative for depressed mood. The patient is not nervous/anxious.        Objective   Vitals:    09/06/22 1416   BP: 124/84   Pulse: 91   Temp: 98.2 °F (36.8 °C)   TempSrc: Infrared   SpO2: 98%   Weight: 89.8 kg (198 lb)   Height: 167.6 cm (66\")   PainSc: 0-No pain     Body mass index is 31.96 kg/m².    Physical Exam  Vitals and nursing note reviewed.   Constitutional:       General: She is not in acute distress.     Appearance: She is well-developed and well-groomed. She is not diaphoretic.   HENT:      Head: Normocephalic and atraumatic.      Jaw: No tenderness or pain on movement.      Right Ear: Tympanic membrane and external ear normal. No decreased hearing noted.      Left Ear: External ear normal. No decreased hearing noted. Left ear middle ear effusion: mild. Tympanic membrane is not erythematous.      Nose: Nose normal.      Right Sinus: No maxillary sinus tenderness or frontal sinus tenderness.      Left Sinus: No maxillary sinus tenderness or frontal sinus tenderness.      Mouth/Throat:      Mouth: Mucous membranes are moist.      Pharynx: No oropharyngeal exudate or " posterior oropharyngeal erythema.   Eyes:      Extraocular Movements: Extraocular movements intact.      Conjunctiva/sclera: Conjunctivae normal.      Pupils: Pupils are equal, round, and reactive to light.   Neck:      Thyroid: No thyromegaly.      Vascular: No carotid bruit.      Trachea: No tracheal deviation.   Cardiovascular:      Rate and Rhythm: Normal rate and regular rhythm.      Pulses: Normal pulses.      Heart sounds: Normal heart sounds. No murmur heard.  Pulmonary:      Effort: Pulmonary effort is normal. No respiratory distress.      Breath sounds: Normal breath sounds.   Abdominal:      General: Bowel sounds are normal.      Palpations: Abdomen is soft. There is no hepatomegaly or splenomegaly.      Tenderness: There is no abdominal tenderness. There is no guarding.      Comments: Gravid   Musculoskeletal:         General: Normal range of motion.      Cervical back: Normal range of motion and neck supple. No bony tenderness.      Thoracic back: No bony tenderness.      Lumbar back: No bony tenderness.      Right lower leg: Right lower leg edema: trace pedal.      Left lower leg: Left lower leg edema: trace pedal.   Lymphadenopathy:      Cervical: No cervical adenopathy.   Skin:     General: Skin is warm and dry.      Findings: No rash.   Neurological:      Mental Status: She is alert and oriented to person, place, and time.      Cranial Nerves: No cranial nerve deficit.      Motor: Motor function is intact.      Coordination: Coordination normal.      Gait: Gait normal.      Deep Tendon Reflexes: Reflexes are normal and symmetric.   Psychiatric:         Mood and Affect: Mood normal.         Behavior: Behavior normal.         Thought Content: Thought content normal.         Cognition and Memory: Cognition normal.         Judgment: Judgment normal.         Lab Results   Component Value Date    WBC 7.2 01/25/2022    RBC 4.18 01/25/2022    HGB 13.3 01/25/2022    HCT 38.4 01/25/2022    MCV 92 01/25/2022     MCH 31.8 01/25/2022    MCHC 34.6 01/25/2022    RDW 11.8 01/25/2022    RDWSD 38.7 12/06/2019    MPV 8.8 12/06/2019     01/25/2022    NEUTRORELPCT 63 01/25/2022    LYMPHORELPCT 30 01/25/2022    MONORELPCT 6 01/25/2022    EOSRELPCT 1 01/25/2022    BASORELPCT 0 01/25/2022    AUTOIGPER 0.4 12/06/2019    NEUTROABS 4.6 01/25/2022    LYMPHSABS 2.2 01/25/2022    MONOSABS 0.4 01/25/2022    EOSABS 0.1 01/25/2022    BASOSABS 0.0 01/25/2022    AUTOIGNUM 0.04 12/06/2019    NRBC 0.0 01/12/2021     Lab Results   Component Value Date    GLUCOSE 131 (H) 11/10/2021    BUN 10 11/10/2021    CREATININE 0.77 11/10/2021    EGFRIFNONA 101 11/10/2021    EGFRIFAFRI 117 11/10/2021    BCR 13 11/10/2021    K 4.9 11/10/2021    CO2 23 11/10/2021    CALCIUM 9.9 11/10/2021    PROTENTOTREF 7.1 01/12/2021    ALBUMIN 4.70 01/12/2021    LABIL2 2.0 01/12/2021    AST 21 01/12/2021    ALT 23 01/12/2021      Lab Results   Component Value Date    CHLPL 180 01/12/2021    TRIG 118 01/12/2021    HDL 71 (H) 01/12/2021    VLDL 21 01/12/2021    LDL 88 01/12/2021     Lab Results   Component Value Date    HGBA1C 6.4 (H) 01/25/2022         Assessment    Problem List Items Addressed This Visit     Type 2 diabetes mellitus without complication, without long-term current use of insulin (HCC)    Current Assessment & Plan     Diabetes is stable.   Continue current treatment regimen.  Reminded to get yearly retinal exam.  Diabetes will be reassessed in 3 months.  Continue Insulin twice daily per GYN.         Insulin controlled gestational diabetes mellitus (GDM) in third trimester    Current Assessment & Plan     Diabetes is stable.   Continue current treatment regimen.  Diabetes will be reassessed in 3 months.         Relevant Orders    Comprehensive Metabolic Panel    Hemoglobin A1c      Other Visit Diagnoses     Encounter for annual physical exam    -  Primary    Relevant Orders    Comprehensive Metabolic Panel    Hemoglobin A1c          Return in about 3  months (around 12/6/2022) for Recheck.or sooner if symptoms persist or worsen.  Impression: Health maintenance visit.  Currently, eats a healthy diet and has a regular exercise routine.  Cervical cancer screening: UTD per GYN.  Breast cancer screening: not indicated. Colorectal cancer screening: not indicated.  Screening lab work includes: CMP.  Immunizations: UTD; risks and benefits of immunizations were discussed with patient.  Patient was advised to be evaluated by ophthalmology.  Advice and education were given regarding nutrition and aerobic exercise.         COVID-19 Precautions - Patient was compliant in wearing a mask. When I saw the patient, I used appropriate personal protective equipment (PPE) including mask, gloves, and eye shield (standard procedure).  Hand hygiene was completed before and after seeing the patient.

## 2022-09-06 NOTE — PATIENT INSTRUCTIONS
Continue to monitor your blood sugar once daily before a meal and record results.  Continue to work on healthy diet and exercise.  Follow up pending lab results.  Follow up in 3 months, or sooner if problems or concerns.

## 2022-09-07 ENCOUNTER — ROUTINE PRENATAL (OUTPATIENT)
Dept: OBSTETRICS AND GYNECOLOGY | Facility: CLINIC | Age: 37
End: 2022-09-07

## 2022-09-07 ENCOUNTER — DOCUMENTATION (OUTPATIENT)
Dept: OBSTETRICS AND GYNECOLOGY | Facility: CLINIC | Age: 37
End: 2022-09-07

## 2022-09-07 VITALS — DIASTOLIC BLOOD PRESSURE: 70 MMHG | WEIGHT: 198.4 LBS | BODY MASS INDEX: 32.02 KG/M2 | SYSTOLIC BLOOD PRESSURE: 120 MMHG

## 2022-09-07 DIAGNOSIS — O28.8 AFI (AMNIOTIC FLUID INDEX) BORDERLINE LOW: Primary | ICD-10-CM

## 2022-09-07 PROBLEM — O24.414 INSULIN CONTROLLED GESTATIONAL DIABETES MELLITUS (GDM) IN THIRD TRIMESTER: Status: ACTIVE | Noted: 2022-09-07

## 2022-09-07 LAB
ALBUMIN SERPL-MCNC: 3.6 G/DL (ref 3.8–4.8)
ALBUMIN/GLOB SERPL: 1.7 {RATIO} (ref 1.2–2.2)
ALP SERPL-CCNC: 87 IU/L (ref 44–121)
ALT SERPL-CCNC: 23 IU/L (ref 0–32)
AST SERPL-CCNC: 27 IU/L (ref 0–40)
BILIRUB SERPL-MCNC: 0.6 MG/DL (ref 0–1.2)
BUN SERPL-MCNC: 9 MG/DL (ref 6–20)
BUN/CREAT SERPL: 14 (ref 9–23)
CALCIUM SERPL-MCNC: 9.6 MG/DL (ref 8.7–10.2)
CHLORIDE SERPL-SCNC: 99 MMOL/L (ref 96–106)
CO2 SERPL-SCNC: 19 MMOL/L (ref 20–29)
CREAT SERPL-MCNC: 0.64 MG/DL (ref 0.57–1)
EGFRCR-CYS SERPLBLD CKD-EPI 2021: 118 ML/MIN/1.73
GLOBULIN SER CALC-MCNC: 2.1 G/DL (ref 1.5–4.5)
GLUCOSE SERPL-MCNC: 79 MG/DL (ref 65–99)
HBA1C MFR BLD: 5.2 % (ref 4.8–5.6)
POTASSIUM SERPL-SCNC: 4.2 MMOL/L (ref 3.5–5.2)
PROT SERPL-MCNC: 5.7 G/DL (ref 6–8.5)
SODIUM SERPL-SCNC: 134 MMOL/L (ref 134–144)

## 2022-09-07 PROCEDURE — 0502F SUBSEQUENT PRENATAL CARE: CPT | Performed by: OBSTETRICS & GYNECOLOGY

## 2022-09-07 NOTE — ASSESSMENT & PLAN NOTE
Diabetes is stable.   Continue current treatment regimen.  Diabetes will be reassessed in 3 months.

## 2022-09-07 NOTE — ASSESSMENT & PLAN NOTE
Diabetes is stable.   Continue current treatment regimen.  Reminded to get yearly retinal exam.  Diabetes will be reassessed in 3 months.  Continue Insulin twice daily per GYN.

## 2022-09-07 NOTE — PROGRESS NOTES
Neli Bravo  39w0d sent me her blood glucose log today via [employer].    Blood Glucose log is as follows:  830/22  89, 112, 62, 100  8/31/22 95, 81, 70, 88  9/1/22   89, 87, 104, 90  9/2/22  103, 76, 118, 129  9/3/22  104, 112, 94, 84  9/4/22  101, 133, 69, 121  9/5/22  77,  75, 85, 99  9/6/22  95, 116, 89      Plan/Assessment  1) T2D, A2, third trimester    5/8 FBS above goal, few PP above goal  Will increase PM NPH to 35 units  Continue to monitor glucose 4 times a day  Send blood glucose log to me again in one week  Instructed to call sooner if any questions or concerns    Update sent to Dr. Shakir Hawley, APRN  9/7/2022  12:47 EDT

## 2022-09-08 NOTE — PROGRESS NOTES
CC: Obstetric visit    HPI: 36-year-old  1 para 0 at 37-5/7 weeks presents for her routine obstetric visit.  She has no complaints today.  Her baby is moving actively.  Glycemic control was reviewed.  It has been excellent.  Biophysical profile is 8 out of 8 with an amniotic fluid index of 7.42.    Assessment and plan    1.  Intrauterine pregnancy at 37-5/7 weeks  2.  Borderline low amniotic fluid index.  Counseled and questions answered.  I recommend a repeat biophysical profile tomorrow morning with further recommendations pending the results of this study.  Counseled regarding benefits, risks and alternatives to this.  The alternative of an induction of labor due to gestational diabetes with a borderline amniotic fluid index were discussed with the patient.  She had a family  earlier this week and prefers not to be delivered this week if it is safe to wait.  I feel that it is safe if amniotic fluid index returns to normal.  The patient is scheduled for a biophysical profile tomorrow morning.

## 2022-09-13 ENCOUNTER — HOSPITAL ENCOUNTER (OUTPATIENT)
Dept: LABOR AND DELIVERY | Facility: HOSPITAL | Age: 37
Discharge: HOME | End: 2022-09-13

## 2022-09-13 ENCOUNTER — ROUTINE PRENATAL (OUTPATIENT)
Dept: OBSTETRICS AND GYNECOLOGY | Facility: CLINIC | Age: 37
End: 2022-09-13

## 2022-09-13 ENCOUNTER — HOSPITAL ENCOUNTER (INPATIENT)
Facility: HOSPITAL | Age: 37
LOS: 3 days | Discharge: HOME | End: 2022-09-16
Attending: OBSTETRICS & GYNECOLOGY | Admitting: OBSTETRICS & GYNECOLOGY

## 2022-09-13 ENCOUNTER — PREP FOR SURGERY (OUTPATIENT)
Dept: OTHER | Facility: HOSPITAL | Age: 37
End: 2022-09-13

## 2022-09-13 VITALS — WEIGHT: 196 LBS | SYSTOLIC BLOOD PRESSURE: 122 MMHG | DIASTOLIC BLOOD PRESSURE: 78 MMHG | BODY MASS INDEX: 31.64 KG/M2

## 2022-09-13 DIAGNOSIS — O24.414 INSULIN CONTROLLED GESTATIONAL DIABETES MELLITUS (GDM) IN THIRD TRIMESTER: Primary | ICD-10-CM

## 2022-09-13 DIAGNOSIS — O24.919 DIABETES MELLITUS DURING PREGNANCY TREATED WITH INSULIN: ICD-10-CM

## 2022-09-13 DIAGNOSIS — O28.8 AFI (AMNIOTIC FLUID INDEX) DECREASED: ICD-10-CM

## 2022-09-13 DIAGNOSIS — Z79.4 DIABETES MELLITUS DURING PREGNANCY TREATED WITH INSULIN: Primary | ICD-10-CM

## 2022-09-13 DIAGNOSIS — O24.919 DIABETES MELLITUS DURING PREGNANCY TREATED WITH INSULIN: Primary | ICD-10-CM

## 2022-09-13 DIAGNOSIS — Z79.4 DIABETES MELLITUS DURING PREGNANCY TREATED WITH INSULIN: ICD-10-CM

## 2022-09-13 LAB
ABO GROUP BLD: NORMAL
BLD GP AB SCN SERPL QL: NEGATIVE
DEPRECATED RDW RBC AUTO: 40.3 FL (ref 37–54)
ERYTHROCYTE [DISTWIDTH] IN BLOOD BY AUTOMATED COUNT: 12.4 % (ref 12.3–15.4)
GLUCOSE BLDC GLUCOMTR-MCNC: 78 MG/DL (ref 70–130)
GLUCOSE SERPL-MCNC: 82 MG/DL (ref 65–99)
GLUCOSE UR STRIP-MCNC: NEGATIVE MG/DL
HCT VFR BLD AUTO: 31.7 % (ref 34–46.6)
HGB BLD-MCNC: 11.1 G/DL (ref 12–15.9)
MCH RBC QN AUTO: 30.7 PG (ref 26.6–33)
MCHC RBC AUTO-ENTMCNC: 35 G/DL (ref 31.5–35.7)
MCV RBC AUTO: 87.6 FL (ref 79–97)
PLATELET # BLD AUTO: 277 10*3/MM3 (ref 140–450)
PMV BLD AUTO: 9.5 FL (ref 6–12)
PROT UR STRIP-MCNC: NEGATIVE MG/DL
RBC # BLD AUTO: 3.62 10*6/MM3 (ref 3.77–5.28)
RH BLD: POSITIVE
T&S EXPIRATION DATE: NORMAL
WBC NRBC COR # BLD: 13.04 10*3/MM3 (ref 3.4–10.8)

## 2022-09-13 PROCEDURE — 85027 COMPLETE CBC AUTOMATED: CPT | Performed by: OBSTETRICS & GYNECOLOGY

## 2022-09-13 PROCEDURE — 82962 GLUCOSE BLOOD TEST: CPT

## 2022-09-13 PROCEDURE — 86900 BLOOD TYPING SEROLOGIC ABO: CPT | Performed by: OBSTETRICS & GYNECOLOGY

## 2022-09-13 PROCEDURE — 3E0DXGC INTRODUCTION OF OTHER THERAPEUTIC SUBSTANCE INTO MOUTH AND PHARYNX, EXTERNAL APPROACH: ICD-10-PCS | Performed by: OBSTETRICS & GYNECOLOGY

## 2022-09-13 PROCEDURE — 86901 BLOOD TYPING SEROLOGIC RH(D): CPT | Performed by: OBSTETRICS & GYNECOLOGY

## 2022-09-13 PROCEDURE — 82947 ASSAY GLUCOSE BLOOD QUANT: CPT | Performed by: OBSTETRICS & GYNECOLOGY

## 2022-09-13 PROCEDURE — 86850 RBC ANTIBODY SCREEN: CPT | Performed by: OBSTETRICS & GYNECOLOGY

## 2022-09-13 PROCEDURE — 0502F SUBSEQUENT PRENATAL CARE: CPT | Performed by: OBSTETRICS & GYNECOLOGY

## 2022-09-13 RX ORDER — SODIUM CHLORIDE 0.9 % (FLUSH) 0.9 %
10 SYRINGE (ML) INJECTION AS NEEDED
Status: DISCONTINUED | OUTPATIENT
Start: 2022-09-13 | End: 2022-09-14 | Stop reason: HOSPADM

## 2022-09-13 RX ORDER — SODIUM CHLORIDE 0.9 % (FLUSH) 0.9 %
10 SYRINGE (ML) INJECTION EVERY 12 HOURS SCHEDULED
Status: DISCONTINUED | OUTPATIENT
Start: 2022-09-13 | End: 2022-09-14 | Stop reason: HOSPADM

## 2022-09-13 RX ORDER — SODIUM CHLORIDE 0.9 % (FLUSH) 0.9 %
10 SYRINGE (ML) INJECTION EVERY 12 HOURS SCHEDULED
Status: CANCELLED | OUTPATIENT
Start: 2022-09-13

## 2022-09-13 RX ORDER — MAGNESIUM CARB/ALUMINUM HYDROX 105-160MG
30 TABLET,CHEWABLE ORAL ONCE
Status: CANCELLED | OUTPATIENT
Start: 2022-09-13 | End: 2022-09-13

## 2022-09-13 RX ORDER — MAGNESIUM CARB/ALUMINUM HYDROX 105-160MG
30 TABLET,CHEWABLE ORAL ONCE
Status: DISCONTINUED | OUTPATIENT
Start: 2022-09-13 | End: 2022-09-14 | Stop reason: HOSPADM

## 2022-09-13 RX ORDER — PRENATAL VIT NO.126/IRON/FOLIC 28MG-0.8MG
1 TABLET ORAL DAILY
COMMUNITY

## 2022-09-13 RX ORDER — OXYTOCIN-SODIUM CHLORIDE 0.9% IV SOLN 30 UNIT/500ML 30-0.9/5 UT/ML-%
250 SOLUTION INTRAVENOUS CONTINUOUS PRN
Status: CANCELLED | OUTPATIENT
Start: 2022-09-13 | End: 2022-09-13

## 2022-09-13 RX ORDER — LIDOCAINE HYDROCHLORIDE 10 MG/ML
5 INJECTION, SOLUTION EPIDURAL; INFILTRATION; INTRACAUDAL; PERINEURAL AS NEEDED
Status: CANCELLED | OUTPATIENT
Start: 2022-09-13

## 2022-09-13 RX ORDER — METHYLERGONOVINE MALEATE 0.2 MG/ML
200 INJECTION INTRAVENOUS ONCE AS NEEDED
Status: CANCELLED | OUTPATIENT
Start: 2022-09-13

## 2022-09-13 RX ORDER — LIDOCAINE HYDROCHLORIDE 10 MG/ML
5 INJECTION, SOLUTION EPIDURAL; INFILTRATION; INTRACAUDAL; PERINEURAL AS NEEDED
Status: DISCONTINUED | OUTPATIENT
Start: 2022-09-13 | End: 2022-09-14 | Stop reason: HOSPADM

## 2022-09-13 RX ORDER — SODIUM CHLORIDE, SODIUM LACTATE, POTASSIUM CHLORIDE, CALCIUM CHLORIDE 600; 310; 30; 20 MG/100ML; MG/100ML; MG/100ML; MG/100ML
125 INJECTION, SOLUTION INTRAVENOUS CONTINUOUS
Status: DISCONTINUED | OUTPATIENT
Start: 2022-09-13 | End: 2022-09-14

## 2022-09-13 RX ORDER — MISOPROSTOL 100 MCG
25 TABLET ORAL
Status: CANCELLED | OUTPATIENT
Start: 2022-09-13 | End: 2022-09-14

## 2022-09-13 RX ORDER — CARBOPROST TROMETHAMINE 250 UG/ML
250 INJECTION, SOLUTION INTRAMUSCULAR
Status: CANCELLED | OUTPATIENT
Start: 2022-09-13

## 2022-09-13 RX ORDER — MISOPROSTOL 200 UG/1
800 TABLET ORAL ONCE AS NEEDED
Status: CANCELLED | OUTPATIENT
Start: 2022-09-13

## 2022-09-13 RX ORDER — SODIUM CHLORIDE 0.9 % (FLUSH) 0.9 %
10 SYRINGE (ML) INJECTION AS NEEDED
Status: CANCELLED | OUTPATIENT
Start: 2022-09-13

## 2022-09-13 RX ORDER — MISOPROSTOL 100 MCG
25 TABLET ORAL
Status: COMPLETED | OUTPATIENT
Start: 2022-09-13 | End: 2022-09-14

## 2022-09-13 RX ORDER — OXYTOCIN-SODIUM CHLORIDE 0.9% IV SOLN 30 UNIT/500ML 30-0.9/5 UT/ML-%
999 SOLUTION INTRAVENOUS ONCE
Status: CANCELLED | OUTPATIENT
Start: 2022-09-13 | End: 2022-09-13

## 2022-09-13 RX ADMIN — MISOPROSTOL 25 MCG: 100 TABLET ORAL at 20:51

## 2022-09-13 RX ADMIN — SODIUM CHLORIDE, POTASSIUM CHLORIDE, SODIUM LACTATE AND CALCIUM CHLORIDE 125 ML/HR: 600; 310; 30; 20 INJECTION, SOLUTION INTRAVENOUS at 19:30

## 2022-09-13 NOTE — PROGRESS NOTES
CC: Obstetric visit    HPI: 37-year-old  1 para 0 presents at 38-3/7 weeks for her obstetric visit.  Her sugars are well controlled with insulin.  Her baby is moving actively.    Assessment and plan    1.  Intrauterine pregnancy at 38-3/7 weeks  2.  Insulin requiring diabetes.  Sugar control has been good.  3.  Persistent low amniotic fluid index.  Last week, amniotic fluid index decreased to 7.49.  It rebounded to 9.8 later in the week.  This week it has decreased further to 6.3.  Biophysical profile is 8 out of 8.  Because of multiple episodes of low amniotic fluid in the patient with a history of COVID-19 in the past as well as insulin requiring diabetes, I feel that delivery is indicated.  We discussed the benefits and risks of this and I answered the patient's questions.  She agrees with my recommendation for delivery.  Also, because of an unfavorable cervix, I recommend cervical ripening with Cytotec.  We discussed the benefits and risks of this as well as the off label nature of the use of Cytotec.  The patient would like to proceed.

## 2022-09-14 ENCOUNTER — ANESTHESIA (OUTPATIENT)
Dept: LABOR AND DELIVERY | Facility: HOSPITAL | Age: 37
End: 2022-09-14

## 2022-09-14 ENCOUNTER — ANESTHESIA EVENT (OUTPATIENT)
Dept: LABOR AND DELIVERY | Facility: HOSPITAL | Age: 37
End: 2022-09-14

## 2022-09-14 LAB
GLUCOSE BLDC GLUCOMTR-MCNC: 101 MG/DL (ref 70–130)
GLUCOSE BLDC GLUCOMTR-MCNC: 69 MG/DL (ref 70–130)
GLUCOSE BLDC GLUCOMTR-MCNC: 71 MG/DL (ref 70–130)
GLUCOSE BLDC GLUCOMTR-MCNC: 73 MG/DL (ref 70–130)
GLUCOSE BLDC GLUCOMTR-MCNC: 76 MG/DL (ref 70–130)
GLUCOSE BLDC GLUCOMTR-MCNC: 80 MG/DL (ref 70–130)

## 2022-09-14 PROCEDURE — 88307 TISSUE EXAM BY PATHOLOGIST: CPT

## 2022-09-14 PROCEDURE — 3E033VJ INTRODUCTION OF OTHER HORMONE INTO PERIPHERAL VEIN, PERCUTANEOUS APPROACH: ICD-10-PCS | Performed by: OBSTETRICS & GYNECOLOGY

## 2022-09-14 PROCEDURE — 25010000002 OXYTOCIN PER 10 UNITS: Performed by: OBSTETRICS & GYNECOLOGY

## 2022-09-14 PROCEDURE — 59400 OBSTETRICAL CARE: CPT | Performed by: OBSTETRICS & GYNECOLOGY

## 2022-09-14 PROCEDURE — C1755 CATHETER, INTRASPINAL: HCPCS

## 2022-09-14 PROCEDURE — 25010000002 ROPIVACAINE PER 1 MG: Performed by: ANESTHESIOLOGY

## 2022-09-14 PROCEDURE — 82962 GLUCOSE BLOOD TEST: CPT

## 2022-09-14 PROCEDURE — 10907ZC DRAINAGE OF AMNIOTIC FLUID, THERAPEUTIC FROM PRODUCTS OF CONCEPTION, VIA NATURAL OR ARTIFICIAL OPENING: ICD-10-PCS | Performed by: OBSTETRICS & GYNECOLOGY

## 2022-09-14 PROCEDURE — 0KQM0ZZ REPAIR PERINEUM MUSCLE, OPEN APPROACH: ICD-10-PCS | Performed by: OBSTETRICS & GYNECOLOGY

## 2022-09-14 PROCEDURE — S0260 H&P FOR SURGERY: HCPCS | Performed by: OBSTETRICS & GYNECOLOGY

## 2022-09-14 PROCEDURE — C1755 CATHETER, INTRASPINAL: HCPCS | Performed by: ANESTHESIOLOGY

## 2022-09-14 RX ORDER — DOCUSATE SODIUM 100 MG/1
100 CAPSULE, LIQUID FILLED ORAL 2 TIMES DAILY
Status: DISCONTINUED | OUTPATIENT
Start: 2022-09-14 | End: 2022-09-16 | Stop reason: HOSPADM

## 2022-09-14 RX ORDER — ERYTHROMYCIN 5 MG/G
OINTMENT OPHTHALMIC
Status: ACTIVE
Start: 2022-09-14 | End: 2022-09-15

## 2022-09-14 RX ORDER — CARBOPROST TROMETHAMINE 250 UG/ML
250 INJECTION, SOLUTION INTRAMUSCULAR
Status: DISCONTINUED | OUTPATIENT
Start: 2022-09-14 | End: 2022-09-14 | Stop reason: HOSPADM

## 2022-09-14 RX ORDER — OXYTOCIN-SODIUM CHLORIDE 0.9% IV SOLN 30 UNIT/500ML 30-0.9/5 UT/ML-%
999 SOLUTION INTRAVENOUS ONCE
Status: COMPLETED | OUTPATIENT
Start: 2022-09-14 | End: 2022-09-14

## 2022-09-14 RX ORDER — IBUPROFEN 600 MG/1
600 TABLET ORAL EVERY 8 HOURS PRN
Status: DISCONTINUED | OUTPATIENT
Start: 2022-09-14 | End: 2022-09-16 | Stop reason: HOSPADM

## 2022-09-14 RX ORDER — SODIUM CHLORIDE 0.9 % (FLUSH) 0.9 %
1-10 SYRINGE (ML) INJECTION AS NEEDED
Status: DISCONTINUED | OUTPATIENT
Start: 2022-09-14 | End: 2022-09-16 | Stop reason: HOSPADM

## 2022-09-14 RX ORDER — DIPHENHYDRAMINE HYDROCHLORIDE 50 MG/ML
12.5 INJECTION INTRAMUSCULAR; INTRAVENOUS EVERY 8 HOURS PRN
Status: DISCONTINUED | OUTPATIENT
Start: 2022-09-14 | End: 2022-09-14 | Stop reason: HOSPADM

## 2022-09-14 RX ORDER — FAMOTIDINE 10 MG/ML
20 INJECTION, SOLUTION INTRAVENOUS ONCE AS NEEDED
Status: DISCONTINUED | OUTPATIENT
Start: 2022-09-14 | End: 2022-09-14 | Stop reason: HOSPADM

## 2022-09-14 RX ORDER — ROPIVACAINE HYDROCHLORIDE 2 MG/ML
INJECTION, SOLUTION EPIDURAL; INFILTRATION; PERINEURAL AS NEEDED
Status: DISCONTINUED | OUTPATIENT
Start: 2022-09-14 | End: 2022-09-14 | Stop reason: SURG

## 2022-09-14 RX ORDER — HYDROCODONE BITARTRATE AND ACETAMINOPHEN 10; 325 MG/1; MG/1
1 TABLET ORAL EVERY 4 HOURS PRN
Status: DISCONTINUED | OUTPATIENT
Start: 2022-09-14 | End: 2022-09-16 | Stop reason: HOSPADM

## 2022-09-14 RX ORDER — ONDANSETRON 2 MG/ML
4 INJECTION INTRAMUSCULAR; INTRAVENOUS ONCE AS NEEDED
Status: DISCONTINUED | OUTPATIENT
Start: 2022-09-14 | End: 2022-09-14 | Stop reason: HOSPADM

## 2022-09-14 RX ORDER — BISACODYL 10 MG
10 SUPPOSITORY, RECTAL RECTAL DAILY PRN
Status: DISCONTINUED | OUTPATIENT
Start: 2022-09-15 | End: 2022-09-16 | Stop reason: HOSPADM

## 2022-09-14 RX ORDER — LIDOCAINE HYDROCHLORIDE AND EPINEPHRINE 15; 5 MG/ML; UG/ML
INJECTION, SOLUTION EPIDURAL AS NEEDED
Status: DISCONTINUED | OUTPATIENT
Start: 2022-09-14 | End: 2022-09-14 | Stop reason: SURG

## 2022-09-14 RX ORDER — HYDROCODONE BITARTRATE AND ACETAMINOPHEN 5; 325 MG/1; MG/1
1 TABLET ORAL EVERY 4 HOURS PRN
Status: DISCONTINUED | OUTPATIENT
Start: 2022-09-14 | End: 2022-09-16 | Stop reason: HOSPADM

## 2022-09-14 RX ORDER — MISOPROSTOL 200 UG/1
800 TABLET ORAL ONCE AS NEEDED
Status: DISCONTINUED | OUTPATIENT
Start: 2022-09-14 | End: 2022-09-14 | Stop reason: HOSPADM

## 2022-09-14 RX ORDER — FENTANYL CIT 0.2 MG/100ML-ROPIV 0.2%-NACL 0.9% EPIDURAL INJ 2/0.2 MCG/ML-%
10 SOLUTION INJECTION CONTINUOUS
Status: DISCONTINUED | OUTPATIENT
Start: 2022-09-14 | End: 2022-09-14

## 2022-09-14 RX ORDER — METHYLERGONOVINE MALEATE 0.2 MG/ML
200 INJECTION INTRAVENOUS ONCE AS NEEDED
Status: DISCONTINUED | OUTPATIENT
Start: 2022-09-14 | End: 2022-09-14 | Stop reason: HOSPADM

## 2022-09-14 RX ORDER — PRENATAL VIT/IRON FUM/FOLIC AC 27MG-0.8MG
1 TABLET ORAL DAILY
Status: DISCONTINUED | OUTPATIENT
Start: 2022-09-15 | End: 2022-09-16 | Stop reason: HOSPADM

## 2022-09-14 RX ORDER — OXYTOCIN-SODIUM CHLORIDE 0.9% IV SOLN 30 UNIT/500ML 30-0.9/5 UT/ML-%
250 SOLUTION INTRAVENOUS CONTINUOUS PRN
Status: ACTIVE | OUTPATIENT
Start: 2022-09-14 | End: 2022-09-14

## 2022-09-14 RX ORDER — PHYTONADIONE 1 MG/.5ML
INJECTION, EMULSION INTRAMUSCULAR; INTRAVENOUS; SUBCUTANEOUS
Status: ACTIVE
Start: 2022-09-14 | End: 2022-09-15

## 2022-09-14 RX ORDER — HYDROXYZINE 50 MG/1
50 TABLET, FILM COATED ORAL NIGHTLY PRN
Status: DISCONTINUED | OUTPATIENT
Start: 2022-09-14 | End: 2022-09-16 | Stop reason: HOSPADM

## 2022-09-14 RX ORDER — METHYLERGONOVINE MALEATE 0.2 MG/ML
200 INJECTION INTRAVENOUS ONCE AS NEEDED
Status: DISCONTINUED | OUTPATIENT
Start: 2022-09-14 | End: 2022-09-16 | Stop reason: HOSPADM

## 2022-09-14 RX ORDER — OXYTOCIN-SODIUM CHLORIDE 0.9% IV SOLN 30 UNIT/500ML 30-0.9/5 UT/ML-%
2-20 SOLUTION INTRAVENOUS
Status: DISCONTINUED | OUTPATIENT
Start: 2022-09-14 | End: 2022-09-14

## 2022-09-14 RX ORDER — HYDROCORTISONE 25 MG/G
1 CREAM TOPICAL AS NEEDED
Status: DISCONTINUED | OUTPATIENT
Start: 2022-09-14 | End: 2022-09-16 | Stop reason: HOSPADM

## 2022-09-14 RX ORDER — OXYTOCIN-SODIUM CHLORIDE 0.9% IV SOLN 30 UNIT/500ML 30-0.9/5 UT/ML-%
125 SOLUTION INTRAVENOUS CONTINUOUS PRN
Status: COMPLETED | OUTPATIENT
Start: 2022-09-14 | End: 2022-09-14

## 2022-09-14 RX ORDER — EPHEDRINE SULFATE 50 MG/ML
5 INJECTION, SOLUTION INTRAVENOUS AS NEEDED
Status: DISCONTINUED | OUTPATIENT
Start: 2022-09-14 | End: 2022-09-14 | Stop reason: HOSPADM

## 2022-09-14 RX ADMIN — SODIUM CHLORIDE, POTASSIUM CHLORIDE, SODIUM LACTATE AND CALCIUM CHLORIDE 125 ML/HR: 600; 310; 30; 20 INJECTION, SOLUTION INTRAVENOUS at 01:25

## 2022-09-14 RX ADMIN — Medication 10 ML/HR: at 12:45

## 2022-09-14 RX ADMIN — OXYTOCIN 125 ML/HR: 10 INJECTION INTRAVENOUS at 21:48

## 2022-09-14 RX ADMIN — SODIUM CHLORIDE, POTASSIUM CHLORIDE, SODIUM LACTATE AND CALCIUM CHLORIDE 125 ML/HR: 600; 310; 30; 20 INJECTION, SOLUTION INTRAVENOUS at 18:08

## 2022-09-14 RX ADMIN — Medication 10 ML/HR: at 18:09

## 2022-09-14 RX ADMIN — IBUPROFEN 600 MG: 600 TABLET ORAL at 23:19

## 2022-09-14 RX ADMIN — SODIUM CHLORIDE, POTASSIUM CHLORIDE, SODIUM LACTATE AND CALCIUM CHLORIDE 125 ML/HR: 600; 310; 30; 20 INJECTION, SOLUTION INTRAVENOUS at 06:42

## 2022-09-14 RX ADMIN — ROPIVACAINE HYDROCHLORIDE 10 ML: 2 INJECTION, SOLUTION EPIDURAL; INFILTRATION at 12:42

## 2022-09-14 RX ADMIN — SODIUM CHLORIDE, POTASSIUM CHLORIDE, SODIUM LACTATE AND CALCIUM CHLORIDE 1000 ML: 600; 310; 30; 20 INJECTION, SOLUTION INTRAVENOUS at 12:26

## 2022-09-14 RX ADMIN — LIDOCAINE HYDROCHLORIDE AND EPINEPHRINE 4 ML: 15; 5 INJECTION, SOLUTION EPIDURAL at 12:38

## 2022-09-14 RX ADMIN — Medication 1 APPLICATION: at 23:20

## 2022-09-14 RX ADMIN — MISOPROSTOL 25 MCG: 100 TABLET ORAL at 05:41

## 2022-09-14 RX ADMIN — OXYTOCIN 2 MILLI-UNITS/MIN: 10 INJECTION INTRAVENOUS at 10:26

## 2022-09-14 RX ADMIN — MISOPROSTOL 25 MCG: 100 TABLET ORAL at 01:02

## 2022-09-14 RX ADMIN — OXYTOCIN 999 ML/HR: 10 INJECTION INTRAVENOUS at 20:28

## 2022-09-14 RX ADMIN — DOCUSATE SODIUM 100 MG: 100 CAPSULE, LIQUID FILLED ORAL at 23:19

## 2022-09-14 NOTE — ANESTHESIA PREPROCEDURE EVALUATION
Anesthesia Evaluation                  Airway   Mallampati: II  TM distance: >3 FB  Neck ROM: full  Dental - normal exam     Pulmonary - normal exam   Cardiovascular - normal exam        Neuro/Psych  GI/Hepatic/Renal/Endo    (+)   diabetes mellitus,     Musculoskeletal     Abdominal    Substance History      OB/GYN    (+) Pregnant,         Other                        Anesthesia Plan    ASA 2     epidural       Anesthetic plan, risks, benefits, and alternatives have been provided, discussed and informed consent has been obtained with: patient.        CODE STATUS:

## 2022-09-14 NOTE — ANESTHESIA PROCEDURE NOTES
Labor Epidural      Patient location during procedure: OB  Performed By  Anesthesiologist: Jorge Luis Mccoy MD  Preanesthetic Checklist  Completed: patient identified, risks and benefits discussed, pre-op evaluation and timeout performed  Additional Notes  38w4d gestation  Prep:  Pt Position:sitting  Sterile Tech:cap, gloves, mask and sterile barrier  Prep:chlorhexidine gluconate and isopropyl alcohol  Monitoring:blood pressure monitoring, continuous pulse oximetry and EKG  Epidural Block Procedure:  Approach:midline  Guidance:landmark technique  Location:L3-L4  Needle Type:Tuohy  Needle Gauge:17  Loss of Resistance Medium: air  Loss of Resistance: 6cm  Cath Depth at skin:11 cm  Paresthesia: none  Aspiration:negative  Test Dose:negative  Number of Attempts: 1  Post Assessment:  Dressing:occlusive dressing applied and secured with tape  Pt Tolerance:patient tolerated the procedure well with no apparent complications  Complications:no

## 2022-09-14 NOTE — H&P
H&P Note    Patient Identification:  Name: Neli Bravo  Age: 37 y.o.  Sex: female  :  1985  MRN: [id number]                       Chief Complaint: Induction of labor    History of Present Illness:   37-year-old  1 para 0 presents at 38-4/7 weeks for an induction of labor.  This is being performed due to insulin requiring diabetes and persistent low amniotic fluid.  Yesterday, the patient had a biophysical profile of 8 out of 8, but amniotic fluid index was 6.3.  It has been intermittently low for the last several assessments.  Group B strep status is negative.  Fetal heart tones are reactive.  Prenatal course is additionally complicated by history of genital herpes.  The patient reports that her last outbreak occurred long before her pregnancy.  She has been taking Valtrex since 36 weeks.    Problem List:  [unfilled]  Past Medical History:  Past Medical History:   Diagnosis Date   • Acne    • Allergic rhinitis    • Anxiety 2021   • Migraine headache    • Positive depression screening 2021     Past Surgical History:  Past Surgical History:   Procedure Laterality Date   • WISDOM TOOTH EXTRACTION        Home Meds:  Medications Prior to Admission   Medication Sig Dispense Refill Last Dose   • glucose blood test strip Take blood sugar fasting (AM) and 2 hours after start of breakfast, lunch and dinner 100 each 12 2022 at Unknown time   • Insulin NPH, Human,, Isophane, (humuLIN N,novoLIN N) 100 UNIT/ML injection Inject 29 units under the skin with breakfast. Inject 29 units under the skin with dinner (Patient taking differently: Inject 29 units under the skin with breakfast. Inject 35 units under the skin with dinner) 9 each 1 2022 at 0815   • Insulin Pen Needle (BD Pen Needle Cee 2nd Gen) 32G X 4 MM misc 1 each 2 (Two) Times a Day. 90 each 3 2022 at Unknown time   • Lancets misc 1 each 4 (Four) Times a Day. 100 each 1 2022 at Unknown time   • prenatal vitamin (prenatal,  CLASSIC, vitamin) tablet Take 1 tablet by mouth Daily.   2022 at 0900   • acyclovir (ZOVIRAX) 5 % cream Apply as needed for herpes outbreak. 2 g 3    • glucose (B-D GLUCOSE) 5 g chewable tablet Chew 3 tablets As Needed for Low Blood Sugar. 50 tablet 6    • metFORMIN ER (GLUCOPHAGE-XR) 500 MG 24 hr tablet Take 1 tablet by mouth Daily With Dinner. 90 tablet 1      Current Meds:   [unfilled]  Allergies:  No Known Allergies  Immunizations:  Immunization History   Administered Date(s) Administered   • DTaP 1985, 1985, 1985, 1989   • Hep B, Adolescent or Pediatric 2001, 2001, 2002   • Hib (PRP-OMP) 1989   • IPV 1987, 1987, 1989   • MMR 1988, 1998   • Td 1998   • Tdap 2017, 2018     Social History:   Social History     Tobacco Use   • Smoking status: Former Smoker     Packs/day: 0.50     Types: Cigarettes     Quit date: 2020     Years since quittin.6   • Smokeless tobacco: Never Used   • Tobacco comment: previously smoked less than 1/2 pack per day for 3 years   Substance Use Topics   • Alcohol use: Not Currently     Comment: occasional      Family History:  Family History   Problem Relation Age of Onset   • Heart disease Mother         has AICD due to ventricular arrhythmia   • Arrhythmia Mother    • Breast cancer Paternal Grandmother 70   • Diabetes Maternal Grandmother    • Heart attack Maternal Grandmother         in her 40s   • Hypertension Maternal Grandfather         Review of Systems  A comprehensive review of systems was negative.    Objective:  tMax 24 hrs: Temp (24hrs), Av.1 °F (36.7 °C), Min:97.9 °F (36.6 °C), Max:98.4 °F (36.9 °C)    Vitals Ranges:   Temp:  [97.9 °F (36.6 °C)-98.4 °F (36.9 °C)] 97.9 °F (36.6 °C)  Heart Rate:  [] 80  Resp:  [16-18] 18  BP: ()/(52-89) 121/77  Intake and Output Last 3 Shifts:   I/O last 3 completed shifts:  In:  [I.V.:]  Out: -     Exam:     General  Appearance:    Alert, cooperative, no distress, appears stated age   Head:    Normocephalic, without obvious abnormality, atraumatic   Back:     Symmetric, no curvature, ROM normal, no CVA tenderness   Lungs:     Clear to auscultation bilaterally, respirations unlabored   Chest Wall:    No tenderness or deformity    Heart:    Regular rate and rhythm, S1 and S2 normal, no murmur, rub   or gallop       Abdomen:     Soft, non-tender, bowel sounds active all four quadrants,     no masses, no organomegaly   Genitalia:   The vulva was examined under good lighting.  There are no herpetic lesions.  Cervix is 50% effaced and 1 cm dilated with the presenting part at -2 station   Rectal:   Not examined   Extremities:  Equal in size   Skin:   Skin color, texture, turgor normal, no rashes or lesions   Lymph nodes:   Cervical, supraclavicular, and axillary nodes normal       Data Review:    Lab Results (last 24 hours)     Procedure Component Value Units Date/Time    POC Glucose Once [[id number]]  (Normal) Collected: 09/14/22 0758    Specimen: Blood Updated: 09/14/22 0759     Glucose 71 mg/dL      Comment: Meter: [id number] : [id number] Jaden Bolanos RN       POC Glucose Once [[id number]]  (Normal) Collected: 09/14/22 0331    Specimen: Blood Updated: 09/14/22 0332     Glucose 76 mg/dL      Comment: Meter: [id number] : [id number] Nathan Marcelo RN       POC Glucose Once [[id number]]  (Normal) Collected: 09/13/22 2352    Specimen: Blood Updated: 09/13/22 2354     Glucose 78 mg/dL      Comment: Meter: [id number] : [id number] Nathan Marcelo RN       Glucose, Random [[id number]]  (Normal) Collected: 09/13/22 1940    Specimen: Blood from Arm, Left Updated: 09/13/22 2028     Glucose 82 mg/dL     CBC (No Diff) [[id number]]  (Abnormal) Collected: 09/13/22 1940    Specimen: Blood from Arm, Left Updated: 09/13/22 2021     WBC 13.04 10*3/mm3      RBC 3.62 10*6/mm3      Hemoglobin 11.1 g/dL      Hematocrit 31.7 %      MCV 87.6 fL      MCH  30.7 pg      MCHC 35.0 g/dL      RDW 12.4 %      RDW-SD 40.3 fl      MPV 9.5 fL      Platelets 277 10*3/mm3         Assessment:    Diabetes mellitus during pregnancy treated with insulin (HCC)      1.  Intrauterine pregnancy at 38-4/7-weeks  2.  Insulin requiring diabetes  3.  Decreased amniotic fluid index      Plan:  Induction of labor.  The patient has been counseled regarding the benefits, risks and alternatives to this and her questions have been answered.  She would like to proceed.      She received Cytotec overnight.  Cervix is now 50% effaced and 1 cm dilated.  Plan is to transition to Pitocin induction.  I answered the patient's questions and she agrees with these recommendations    Sean Schilling MD  9/14/2022

## 2022-09-14 NOTE — PLAN OF CARE
Goal Outcome Evaluation:    Patient received 3 doses of cytotec overnight. Last SVE was 1/50%/-1.

## 2022-09-15 LAB
BASOPHILS # BLD AUTO: 0.05 10*3/MM3 (ref 0–0.2)
BASOPHILS NFR BLD AUTO: 0.3 % (ref 0–1.5)
DEPRECATED RDW RBC AUTO: 41.6 FL (ref 37–54)
EOSINOPHIL # BLD AUTO: 0.03 10*3/MM3 (ref 0–0.4)
EOSINOPHIL NFR BLD AUTO: 0.2 % (ref 0.3–6.2)
ERYTHROCYTE [DISTWIDTH] IN BLOOD BY AUTOMATED COUNT: 12.7 % (ref 12.3–15.4)
GLUCOSE BLDC GLUCOMTR-MCNC: 142 MG/DL (ref 70–130)
GLUCOSE BLDC GLUCOMTR-MCNC: 147 MG/DL (ref 70–130)
HCT VFR BLD AUTO: 31.2 % (ref 34–46.6)
HGB BLD-MCNC: 10.8 G/DL (ref 12–15.9)
IMM GRANULOCYTES # BLD AUTO: 0.13 10*3/MM3 (ref 0–0.05)
IMM GRANULOCYTES NFR BLD AUTO: 0.8 % (ref 0–0.5)
LYMPHOCYTES # BLD AUTO: 2.32 10*3/MM3 (ref 0.7–3.1)
LYMPHOCYTES NFR BLD AUTO: 13.5 % (ref 19.6–45.3)
MCH RBC QN AUTO: 31.4 PG (ref 26.6–33)
MCHC RBC AUTO-ENTMCNC: 34.6 G/DL (ref 31.5–35.7)
MCV RBC AUTO: 90.7 FL (ref 79–97)
MONOCYTES # BLD AUTO: 1.13 10*3/MM3 (ref 0.1–0.9)
MONOCYTES NFR BLD AUTO: 6.6 % (ref 5–12)
NEUTROPHILS NFR BLD AUTO: 13.57 10*3/MM3 (ref 1.7–7)
NEUTROPHILS NFR BLD AUTO: 78.6 % (ref 42.7–76)
NRBC BLD AUTO-RTO: 0 /100 WBC (ref 0–0.2)
PLATELET # BLD AUTO: 250 10*3/MM3 (ref 140–450)
PMV BLD AUTO: 9.5 FL (ref 6–12)
RBC # BLD AUTO: 3.44 10*6/MM3 (ref 3.77–5.28)
WBC NRBC COR # BLD: 17.23 10*3/MM3 (ref 3.4–10.8)

## 2022-09-15 PROCEDURE — 85025 COMPLETE CBC W/AUTO DIFF WBC: CPT | Performed by: OBSTETRICS & GYNECOLOGY

## 2022-09-15 PROCEDURE — 0503F POSTPARTUM CARE VISIT: CPT | Performed by: OBSTETRICS & GYNECOLOGY

## 2022-09-15 PROCEDURE — 82962 GLUCOSE BLOOD TEST: CPT

## 2022-09-15 RX ADMIN — IBUPROFEN 600 MG: 600 TABLET ORAL at 18:01

## 2022-09-15 RX ADMIN — HYDROCODONE BITARTRATE AND ACETAMINOPHEN 1 TABLET: 5; 325 TABLET ORAL at 20:43

## 2022-09-15 RX ADMIN — IBUPROFEN 600 MG: 600 TABLET ORAL at 08:47

## 2022-09-15 RX ADMIN — METFORMIN HYDROCHLORIDE 500 MG: 500 TABLET ORAL at 18:00

## 2022-09-15 RX ADMIN — DOCUSATE SODIUM 100 MG: 100 CAPSULE, LIQUID FILLED ORAL at 08:47

## 2022-09-15 RX ADMIN — Medication 1 TABLET: at 08:47

## 2022-09-15 RX ADMIN — DOCUSATE SODIUM 100 MG: 100 CAPSULE, LIQUID FILLED ORAL at 20:30

## 2022-09-15 NOTE — ANESTHESIA POSTPROCEDURE EVALUATION
Patient: Neli Bravo    Procedure Summary     Date: 09/14/22 Room / Location:     Anesthesia Start: 1227 Anesthesia Stop: 2019    Procedure: LABOR ANALGESIA Diagnosis:     Scheduled Providers:  Provider: Reina Ordaz MD    Anesthesia Type: epidural ASA Status: 2          Anesthesia Type: epidural    Vitals  Vitals Value Taken Time   /85 09/14/22 2216   Temp 36.9 °C (98.5 °F) 09/14/22 2027   Pulse 89 09/14/22 2220   Resp 16 09/14/22 2200   SpO2 100 % 09/14/22 2220   Vitals shown include unvalidated device data.        Post Anesthesia Care and Evaluation      Comments: I or one of my partners was immediately available during labor and the post-partum period.

## 2022-09-15 NOTE — LACTATION NOTE
Mom wanting assistance with latching baby. Mom has flat nipples and baby unable to latch. Used 24 mm nipple shield with instructions on use and cleaning. Baby latching well to nipple shield. Parents giving supplemental formula. Baby took 10 cc's of formula per syringe while BF. Baby tolerated well. Encouraged mom to BF at least every 2-3 hours for 10-15 min, pump and supplement all colostrum to baby. Encouraged to call LC as needed    Lactation Consult Note    Evaluation Completed: 9/15/2022 11:16 EDT  Patient Name: Neli Bravo  :  1985  MRN:  [id number]     REFERRAL  INFORMATION:                                         DELIVERY HISTORY:        Skin to skin initiation date/time: 2022  8:20 PM   Skin to skin end date/time: 2022  9:15 PM        MATERNAL ASSESSMENT:                               INFANT ASSESSMENT:  Information for the patient's :  Anna Bravo [[id number]]   No past medical history on file.                                                                                                     MATERNAL INFANT FEEDING:                                                                       EQUIPMENT TYPE:                                 BREAST PUMPING:          LACTATION REFERRALS:

## 2022-09-15 NOTE — LACTATION NOTE
Baby not in room. Mom reports baby has attempted to latch and has tongue tie. She reports she has given formula for now. Encouraged mom to review provided booklet on BF and call LC as needed. Mom has personal pump and OPLC info    Lactation Consult Note    Evaluation Completed: 9/15/2022 09:01 EDT  Patient Name: Neli Bravo  :  1985  MRN:  [id number]     REFERRAL  INFORMATION:                                         DELIVERY HISTORY:        Skin to skin initiation date/time: 2022  8:20 PM   Skin to skin end date/time: 2022  9:15 PM        MATERNAL ASSESSMENT:                               INFANT ASSESSMENT:  Information for the patient's :  Anna Bravo [[id number]]   No past medical history on file.                                                                                                     MATERNAL INFANT FEEDING:                                                                       EQUIPMENT TYPE:                                 BREAST PUMPING:          LACTATION REFERRALS:

## 2022-09-15 NOTE — PROGRESS NOTES
Postpartum Progress Note      Status post Vaginal Delivery: Doing well postoperatively.     1) postpartum care immediately following delivery : doing well, routine care   2) DM - per patient on metformin with dinner prior to pregnancy. So return to prior treatment and have her check BS 4 x per day to see if type II actively now.     Rh status: B positive   Rubella: immune   Gender: Female     Subjective     Postpartum Day 1: Vaginal delivery    The patient feels well. The patient denies emotional concerns. Pain is well controlled with current medications. The baby is well. The patient is ambulating well. The patient is tolerating a normal diet.     Objective     Vital signs in last 24 hours:  Temp:  [97.3 °F (36.3 °C)-99.1 °F (37.3 °C)] 97.3 °F (36.3 °C)  Heart Rate:  [] 88  Resp:  [16-20] 18  BP: (103-138)/(59-90) 119/83      General:    alert, appears stated age and cooperative   Abdomen:  Soft, Non-tender    Lochia:  appropriate   Uterine Fundus:   firm   Ext    Edema 1+   DVT Evaluation:  No evidence of DVT seen on physical exam.     Lab Results   Component Value Date    WBC 17.23 (H) 09/15/2022    HGB 10.8 (L) 09/15/2022    HCT 31.2 (L) 09/15/2022    MCV 90.7 09/15/2022     09/15/2022       Jiaro Shabazz MD  9/15/2022  11:29 EDT

## 2022-09-15 NOTE — PLAN OF CARE
Goal Outcome Evaluation:  Plan of Care Reviewed With: patient, significant other        Progress: improving   Vital signs stable. Pain is controlled with po medication. Ambulating without difficulty. Voided post delivery. Tolerating regular diet.

## 2022-09-15 NOTE — L&D DELIVERY NOTE
Delivery Note    Obstetrician:   Sean Schilling MD      Pre-Delivery Diagnosis: 1.  Intrauterine pregnancy at 38-4/7 weeks  2.  Insulin requiring diabetes  3.  Decreased amniotic fluid    Post-Delivery Diagnosis: Same    Procedure: Spontaneous vaginal delivery     Episiotomy or Incision: none  37-year-old  1 para 0 presented at 38-4/7 weeks for an induction of labor.  Cervical ripening was undertaken with Cytotec, followed by induction with Pitocin.  Group B strep status was negative.  Fetal heart tones were reactive.  Careful perineal exam was negative for vesicular lesions.      Amniotomy was performed at 1 cm with return of clear fluid.  An intrauterine pressure catheter was placed to better monitor contractions.  Labor was induced with Pitocin.  An epidural catheter was placed for pain control.      The patient progressed along an adequate labor curve to complete effacement and dilatation as well as +2 station of the presenting part.  She then began to push.  She pushed to spontaneous vaginal delivery of the fetal head from direct occiput anterior presentation.  The mouth and naris were suctioned with a bulb while on the perineum.  Shoulders were delivered without difficulty, followed by the remainder of the infant.      After delay of 30 seconds, the cord was doubly clamped and divided.  The infant was then placed on the mother's chest for Kangaroo care.  The placenta  spontaneously.  It was intact and had a three-vessel cord.  The perineum was inspected.  There was a second-degree midline laceration.  This was repaired with 3-0 Monocryl in layers.  The cervix was examined.  It was intact.            Apgars: 1' 8  /  5' 9     Placenta and Cord:          Mechanism: spontaneous        Description:  complete    Estimated Blood Loss:  61cc                             Complications:  None                 2022  Sean Schilling MD

## 2022-09-16 VITALS
DIASTOLIC BLOOD PRESSURE: 80 MMHG | SYSTOLIC BLOOD PRESSURE: 122 MMHG | HEART RATE: 99 BPM | OXYGEN SATURATION: 100 % | BODY MASS INDEX: 31.64 KG/M2 | HEIGHT: 66 IN | RESPIRATION RATE: 18 BRPM | TEMPERATURE: 97.8 F

## 2022-09-16 PROCEDURE — 0503F POSTPARTUM CARE VISIT: CPT | Performed by: NURSE PRACTITIONER

## 2022-09-16 RX ORDER — HYDROCODONE BITARTRATE AND ACETAMINOPHEN 5; 325 MG/1; MG/1
TABLET ORAL
Qty: 12 TABLET | Refills: 0 | Status: SHIPPED | OUTPATIENT
Start: 2022-09-16 | End: 2022-12-06

## 2022-09-16 RX ORDER — IBUPROFEN 600 MG/1
600 TABLET ORAL EVERY 8 HOURS PRN
Qty: 90 TABLET | Refills: 1 | Status: SHIPPED | OUTPATIENT
Start: 2022-09-16 | End: 2022-12-06

## 2022-09-16 RX ORDER — PSEUDOEPHEDRINE HCL 30 MG
100 TABLET ORAL 2 TIMES DAILY
Qty: 60 CAPSULE | Refills: 1 | Status: SHIPPED | OUTPATIENT
Start: 2022-09-16 | End: 2022-12-06

## 2022-09-16 RX ADMIN — HYDROCODONE BITARTRATE AND ACETAMINOPHEN 1 TABLET: 5; 325 TABLET ORAL at 02:13

## 2022-09-16 RX ADMIN — Medication 1 TABLET: at 08:56

## 2022-09-16 RX ADMIN — DOCUSATE SODIUM 100 MG: 100 CAPSULE, LIQUID FILLED ORAL at 08:56

## 2022-09-16 RX ADMIN — IBUPROFEN 600 MG: 600 TABLET ORAL at 02:12

## 2022-09-16 NOTE — LACTATION NOTE
This note was copied from a baby's chart.  PT is going home today. Mom reports baby is latching some, but she is also formula feeding. Educated on the importance of stimulation for adequate milk supply and on baby's expected output and weight gain. Informed her about the OPLC info and and mommy and Me info on the back of the educational booklet. Discussed engorgement, mastitis, pumping, milk storage, colostrum expectations and when to expect mature milk supply. PT declines any questions and concerns at this time. Encouraged to call LC if needing further assistance.

## 2022-09-16 NOTE — PROGRESS NOTES
Postpartum Progress Note      Status post Vaginal Delivery: Doing well postoperatively.     1) Postpartum care immediately following delivery :    Routine care, discharge home today. Reviewed discharge instructions in detail    2) T2D, A2: Insulin has been discontinued and she was restarted on metformin yesterday which she was on prior to pregnancy. Discussed A1C vs GTT in several weeks. No FBS to review. PP running 140s. Recent A1C with PCP 5.1.    3) back pain: c/o mild, low back pain, managing with motrin. No evidence of hematoma, no edema or erythema.     Rh status: B+  Rubella: Immune  Gender: Female    Subjective     Postpartum Day 2: Vaginal delivery    The patient feels well. The patient denies emotional concerns. Pain is well controlled with current medications. The baby is well. The patient is ambulating well. The patient is tolerating a normal diet.     Objective     Vital signs in last 24 hours:  Temp:  [97.4 °F (36.3 °C)-98 °F (36.7 °C)] 97.8 °F (36.6 °C)  Heart Rate:  [] 99  Resp:  [16-18] 18  BP: (116-123)/(80) 122/80      General:    alert, appears stated age and cooperative   CV: RRR, no m/r/g   Lungs: CTAB   Abdomen:  Soft, Non-tender    Lochia:  appropriate   Uterine Fundus:   firm   Ext    Edema trace   DVT Evaluation:  No evidence of DVT seen on physical exam.     Lab Results   Component Value Date    WBC 17.23 (H) 09/15/2022    HGB 10.8 (L) 09/15/2022    HCT 31.2 (L) 09/15/2022    MCV 90.7 09/15/2022     09/15/2022       Corazon Hawley, APRN  9/16/2022  09:53 EDT

## 2022-10-27 ENCOUNTER — OFFICE VISIT (OUTPATIENT)
Dept: ORTHOPEDIC SURGERY | Facility: CLINIC | Age: 37
End: 2022-10-27

## 2022-10-27 VITALS — BODY MASS INDEX: 29.57 KG/M2 | RESPIRATION RATE: 16 BRPM | TEMPERATURE: 97.6 F | WEIGHT: 184 LBS | HEIGHT: 66 IN

## 2022-10-27 DIAGNOSIS — S76.112A STRAIN OF LEFT QUADRICEPS TENDON, INITIAL ENCOUNTER: ICD-10-CM

## 2022-10-27 DIAGNOSIS — M25.562 ACUTE PAIN OF LEFT KNEE: ICD-10-CM

## 2022-10-27 DIAGNOSIS — R52 PAIN: Primary | ICD-10-CM

## 2022-10-27 PROCEDURE — 99213 OFFICE O/P EST LOW 20 MIN: CPT | Performed by: NURSE PRACTITIONER

## 2022-10-27 PROCEDURE — 73562 X-RAY EXAM OF KNEE 3: CPT | Performed by: NURSE PRACTITIONER

## 2022-10-27 RX ORDER — VALACYCLOVIR HYDROCHLORIDE 500 MG/1
500 TABLET, FILM COATED ORAL DAILY PRN
COMMUNITY
Start: 2022-09-25

## 2022-10-27 NOTE — PROGRESS NOTES
Patient: Neli Bravo  YOB: 1985 37 y.o. female  Medical Record Number: [id number]    Chief Complaints:   Chief Complaint   Patient presents with   • Left Knee - Initial Evaluation     Patient is here today for left knee pain, patient states that she has been having left knee pain for 2 months       History of Present Illness:Neli Bravo is a 37 y.o. female who presents with complaints of having left knee pain that started approximately 2 months ago.  Patient states she was 8 months pregnant at the time and did not know if this has contributed to her problems.  She denies no known injury to her knee.  She states the pain is located just above her kneecap to the proximal end.  She describes the pain as a moderate ache and states that it is on a daily occurrence.  She states she has a dull ache constantly but the intensity can worsen.  She states the pain is worse with going down the stairs, she reports when she is sitting down she exhibits no pain.  She states that she has tried bracing as well as ibuprofen which is giving her minimal relief.  She denies any signs or symptoms of infection, and she is without any other significant complaints today.    Allergies: No Known Allergies    Medications:   Current Outpatient Medications   Medication Sig Dispense Refill   • docusate sodium 100 MG capsule Take 1 capsule by mouth 2 (Two) Times a Day. 60 capsule 1   • ibuprofen (ADVIL,MOTRIN) 600 MG tablet Take 1 tablet by mouth Every 8 (Eight) Hours As Needed for Mild Pain. 90 tablet 1   • metFORMIN (Glucophage) 500 MG tablet Take 1 tablet by mouth 2 (Two) Times a Day With Meals. 60 tablet 1   • prenatal vitamin (prenatal, CLASSIC, vitamin) tablet Take 1 tablet by mouth Daily.     • valACYclovir (VALTREX) 500 MG tablet Take 1 tablet by mouth Daily.     • acyclovir (ZOVIRAX) 5 % cream Apply as needed for herpes outbreak. 2 g 3   • HYDROcodone-acetaminophen (NORCO) 5-325 MG per tablet Take 1-2 tablets by mouth every  "4-6 hours as needed for moderate pain 12 tablet 0   • Insulin Pen Needle (BD Pen Needle Cee 2nd Gen) 32G X 4 MM misc 1 each 2 (Two) Times a Day. 90 each 3   • Lancets misc 1 each 4 (Four) Times a Day. 100 each 1     No current facility-administered medications for this visit.         The following portions of the patient's history were reviewed and updated as appropriate: allergies, current medications, past family history, past medical history, past social history, past surgical history and problem list.    Review of Systems:   A 14 point review of systems was performed. All systems negative except pertinent positives/negative listed in HPI above    Physical Exam:   Vitals:    10/27/22 0920   Resp: 16   Temp: 97.6 °F (36.4 °C)   TempSrc: Temporal   Weight: 83.5 kg (184 lb)   Height: 167.6 cm (65.98\")   PainSc: 0-No pain       General: A and O x 3, ASA, NAD    SCLERA:    Normal    DENTITION:   Normal    Knee:  left    ALIGNMENT:     Neutral  ,   Patella tracks   midline    GAIT:     Nonantalgic    SKIN:    No abnormality    RANGE OF MOTION: 0  -135; discomfort noted at full extension    STRENGTH:   5 / 5    LIGAMENTS:    No varus / valgus instability.   Negative  Lachman.    MENISCUS:     Negative   Bartolo       DISTAL PULSES:    Paplable    DISTAL SENSATION :   Intact    LYMPHATICS:     No   lymphadenopathy    OTHER:          - No  effusion      - No crepitance with ROM      - No Swelling /  tenderness to palpation pes anserine      bursa       -Tenderness noted to palpation over quad tendon         Radiology:  Xrays 3views (ap,lateral, sunrise) were ordered and reviewed for evaluation of knee pain demonstrating mild joint space narrowing to the medial compartment otherwise normal x-ray.  No other images available for comparison purposes.    Assessment/Plan: Quad tendon strain /left knee pain    Treatment options as well as imaging results were discussed in detail with the patient.  At this point in time we are " going to proceed forward with conservative options.  Patient exhibited pain with full extension on examination as well as tenderness over her quad tendon area.  Patient likely is exhibiting quad tendon strain/tendinitis.  We are going to give her a prescription for physical therapy to work on strengthening exercises.  Also educated her on use of NSAID topical gel.  She can apply this 3-4 times a day as needed.  She can also continue the ibuprofen as needed.  Should the patient not be significantly improved in 6 weeks then we would order an MRI for further evaluation.      Eliseo Isaac, CAMILLE  10/27/2022

## 2022-10-28 ENCOUNTER — POSTPARTUM VISIT (OUTPATIENT)
Dept: OBSTETRICS AND GYNECOLOGY | Facility: CLINIC | Age: 37
End: 2022-10-28

## 2022-10-28 VITALS
WEIGHT: 183.4 LBS | DIASTOLIC BLOOD PRESSURE: 67 MMHG | HEIGHT: 66 IN | SYSTOLIC BLOOD PRESSURE: 118 MMHG | BODY MASS INDEX: 29.47 KG/M2

## 2022-10-28 PROCEDURE — 0503F POSTPARTUM CARE VISIT: CPT | Performed by: OBSTETRICS & GYNECOLOGY

## 2022-10-28 NOTE — PROGRESS NOTES
HPI   Neli Bravo  is a 37 y.o. female who presents for a 6-week postpartum checkup.  Her baby is doing well.  The baby is both breast and bottlefeeding.  The patient is also doing well.  Lochia is minimal.  Pain has resolved.    Chief Complaint   Patient presents with   • Postpartum Care     Patient is here for       Past Medical History:   Diagnosis Date   • Acne    • Allergic rhinitis    • Anxiety 2021   • Migraine headache    • Positive depression screening 2021       Past Surgical History:   Procedure Laterality Date   • WISDOM TOOTH EXTRACTION         Social History     Socioeconomic History   • Marital status: Single   Tobacco Use   • Smoking status: Former     Packs/day: 0.50     Types: Cigarettes     Quit date: 2020     Years since quittin.7   • Smokeless tobacco: Never   • Tobacco comments:     previously smoked less than 1/2 pack per day for 3 years   Substance and Sexual Activity   • Alcohol use: Not Currently     Comment: occasional   • Drug use: No   • Sexual activity: Yes     Partners: Male     Birth control/protection: Pill     Comment: boyfriend        The following portions of the patient's history were reviewed and updated as appropriate: allergies, current medications, past family history, past medical history, past social history, past surgical history and problem list.    Review of Systems   Constitutional: Negative.    HENT: Negative.    Respiratory: Negative.    Cardiovascular: Negative.    Gastrointestinal: Negative.    Genitourinary: Negative.    Musculoskeletal: Negative.    Skin: Negative.    Allergic/Immunologic: Negative.    Psychiatric/Behavioral: Negative.              Physical Exam  Vitals and nursing note reviewed.   Constitutional:       Appearance: She is well-developed.   HENT:      Head: Normocephalic and atraumatic.   Cardiovascular:      Rate and Rhythm: Normal rate and regular rhythm.   Pulmonary:      Effort: Pulmonary effort is normal.      Breath  sounds: Normal breath sounds. No wheezing or rales.   Chest:      Comments: The breasts are homogeneous.  There are no palpable lumps.  Nipple discharge and axillary adenopathy are absent.  Abdominal:      General: There is no distension.      Palpations: Abdomen is soft.      Tenderness: There is no abdominal tenderness.   Genitourinary:     Labia:         Right: No lesion.         Left: No lesion.       Vagina: Normal. No vaginal discharge.      Cervix: No cervical motion tenderness.      Uterus: Normal. Not enlarged and not tender.       Adnexa:         Right: No mass or tenderness.          Left: No mass or tenderness.     Skin:     General: Skin is warm and dry.   Neurological:      Mental Status: She is alert and oriented to person, place, and time.         Assessment    Diagnoses and all orders for this visit:    1. Routine postpartum follow-up (Primary)        Plan  1. Appropriate progress, 6 weeks postpartum.  Okay to resume all normal activities of daily living.  2. Pap is due in November.  3. Contraceptive counseling.  The patient plans to use condoms for now.  When she weans her baby, she plans to change to oral contraceptive pills.    4. No follow-ups on file.    Social History     Tobacco Use   Smoking Status Former   • Packs/day: 0.50   • Types: Cigarettes   • Quit date: 2020   • Years since quittin.7   Smokeless Tobacco Never   Tobacco Comments    previously smoked less than 1/2 pack per day for 3 years   5.

## 2022-11-07 ENCOUNTER — OFFICE VISIT (OUTPATIENT)
Dept: OBSTETRICS AND GYNECOLOGY | Facility: CLINIC | Age: 37
End: 2022-11-07

## 2022-11-07 VITALS
SYSTOLIC BLOOD PRESSURE: 120 MMHG | DIASTOLIC BLOOD PRESSURE: 76 MMHG | BODY MASS INDEX: 29.54 KG/M2 | WEIGHT: 183.8 LBS | HEIGHT: 66 IN

## 2022-11-07 DIAGNOSIS — Z12.4 PAPANICOLAOU SMEAR: Primary | ICD-10-CM

## 2022-11-07 PROCEDURE — 99212 OFFICE O/P EST SF 10 MIN: CPT | Performed by: OBSTETRICS & GYNECOLOGY

## 2022-11-10 ENCOUNTER — TREATMENT (OUTPATIENT)
Dept: PHYSICAL THERAPY | Facility: CLINIC | Age: 37
End: 2022-11-10

## 2022-11-10 DIAGNOSIS — Z74.09 IMPAIRED FUNCTIONAL MOBILITY AND ACTIVITY TOLERANCE: ICD-10-CM

## 2022-11-10 DIAGNOSIS — M76.899 QUADRICEPS TENDONITIS: Primary | ICD-10-CM

## 2022-11-10 DIAGNOSIS — M25.562 CHRONIC PAIN OF LEFT KNEE: ICD-10-CM

## 2022-11-10 DIAGNOSIS — G89.29 CHRONIC PAIN OF LEFT KNEE: ICD-10-CM

## 2022-11-10 PROCEDURE — 97140 MANUAL THERAPY 1/> REGIONS: CPT | Performed by: PHYSICAL THERAPIST

## 2022-11-10 PROCEDURE — 97110 THERAPEUTIC EXERCISES: CPT | Performed by: PHYSICAL THERAPIST

## 2022-11-10 PROCEDURE — 97162 PT EVAL MOD COMPLEX 30 MIN: CPT | Performed by: PHYSICAL THERAPIST

## 2022-11-10 PROCEDURE — 97035 APP MDLTY 1+ULTRASOUND EA 15: CPT | Performed by: PHYSICAL THERAPIST

## 2022-11-10 PROCEDURE — 97112 NEUROMUSCULAR REEDUCATION: CPT | Performed by: PHYSICAL THERAPIST

## 2022-11-10 NOTE — PROGRESS NOTES
Physical Therapy Initial Evaluation and Plan of Care  [facility]  [address], [address]  [place] [address]    Patient: Neli Bravo   : 1985  Diagnosis/ICD-10 Code:  Quadriceps tendonitis [M76.899]  Referring practitioner: CAMILLE Lombardi  Today's Date: 11/10/2022    Subjective Evaluation    History of Present Illness  Mechanism of injury: Has 7 week old daughter - iss currently nursing her daughter  Left knee pain about 10 weeks ago.  Was trying to get out of her when her leg was twisted - had sharp pain whch persisted.  Did not seek treatment due to pregnancy.  Had to sleep with it elevated  Saw Eliseo Isaac 2 weeks ago - felt like it was quad tendonitis - referred to PT - use topical anti inflammatory gel  Does walk when able       Patient Occupation:  - [employer]  Current pain ratin  At best pain ratin  At worst pain rating: 3  Location: Left quad tendon tightness/pain, no knee popping - no give out or lock up  Quality: tight, dull ache and discomfort  Relieving factors: rest, support, medications and ice  Aggravating factors: stairs, squatting and ambulation (putting pressure on the knee, push up from the floor)  Progression: improved    Social Support  Lives in: multiple-level home (currently doing steps one at a time)    Diagnostic Tests  X-ray: normal    Treatments  Current treatment: physical therapy  Patient Goals  Patient goal: make the pain go away, do steps without pain       Objective          Observations     Additional Knee Observation Details  No signs of swelling or muscle defect    Palpation   Left   Tenderness of the rectus femoris, vastus lateralis and vastus medialis.     Tenderness   Left Knee   Tenderness in the quadriceps tendon and superior patella.     Neurological Testing     Sensation     Knee   Left Knee   Intact: light touch    Active Range of Motion   Left Knee   Flexion: 126 degrees    Extension: 0 degrees   Extensor la degrees     Patellar Mobility   Left Knee Hypermobile in the left medial and left lateral patellar tendon(s).     Patellar Static Positioning   Left Knee: WFL    Strength/Myotome Testing     Left Knee   Flexion: 4+  Extension: 4  Quadriceps contraction: good    Additional Strength Details  Slight pain inhibition with strength testing    Tests     Left Knee   Positive patella-femoral grind.   Negative anterior Lachman, posterior drawer, valgus stress test at 0 degrees and varus stress test at 0 degrees.     Left Knee Flexibility Comments:   Decreased flexibility in left hamstrings, rectus femoris and ITB - all minimal to moderate restrictions        Assessment & Plan     Assessment  Impairments: abnormal muscle tone, activity intolerance, impaired physical strength, lacks appropriate home exercise program and pain with function  Functional Limitations: pulling, pushing and stooping  Assessment details: 37 y.o. female 7 weeks post partum with 3 month history of left anterior knee pain presents with: 1. Intermittent left anterior knee pain, 2. Full knee AROM, 3. Tenderness to palpation left quad tendon and quad tendon junction, 4. Increased pain with resisted knee extension, 5. NO signs of ligamentous instability or internal derangement, 6. Increased pain when climbing up/down steps and when trying to get up off of the floor, 7. LEFS Score of 68    Barriers to therapy: Recently post partum  Prognosis: good    Goals  Plan Goals: Short Term Goals: 3 weeks  Patient will be able to tolerate initial exercises  Patient will have pain <3/10  Patient will be able to climb up steps without increased symptoms  Patient will be able to get up off of the floor with 50% less difficulty    Long Term Goals: 6 weeks  Patient will be independent in performing home exercise program.  Patient will have functional pain free knee AROM  Patient will be able to climb up and down steps without increased  symptoms   Patient will be able to get up/down off of the floor without her new born without increased symptoms       Plan  Therapy options: will be seen for skilled therapy services  Planned modality interventions: ultrasound  Planned therapy interventions: manual therapy, strengthening, stretching, therapeutic activities, home exercise program and neuromuscular re-education  Frequency: 1x week  Duration in visits: 6  Duration in weeks: 6  Treatment plan discussed with: patient  Plan details: Access Code: [email or web address]  URL: [email or web address]  Date: 11/10/2022  Prepared by: Gabbie Tompkins    Exercises  Hip Flexor Stretch at Edge of Bed - 1 x daily - 7 x weekly - 3 sets - 3 reps - 20 seconds hold  Supine Hamstring Stretch with Strap - 1 x daily - 7 x weekly - 3 sets - 3 reps - 20 seconds hold  Supine ITB Stretch with Strap - 1 x daily - 7 x weekly - 3 sets - 3 reps - 20 seconds hold  Small Range Straight Leg Raise - 1 x daily - 7 x weekly - 3 sets - 3 reps - 3 seconds hold  Full Leg Press - 1 x daily - 7 x weekly - 2 sets - 15 reps  Supine Hamstring Swiss Ball Curls/Dynamic Mobilization - 1 x daily - 7 x weekly - 2 sets - 15 reps  Standing Hip Extension with Anchored Resistance - 1 x daily - 7 x weekly - 2 sets - 10 reps  Standing Hip Abduction with Anchored Resistance - 1 x daily - 7 x weekly - 2 sets - 10 reps  Standing Hip Adduction with Anchored Resistance - 1 x daily - 7 x weekly - 2 sets - 10 reps  Standing Repeated Hip Flexion with Resistance - 1 x daily - 7 x weekly - 2 sets - 10 reps          Manual Therapy:    10     mins  10359;  Therapeutic Exercise:    15     mins  42946;     Neuromuscular Leland:    15    mins  70610;    Therapeutic Activity:     0     mins  26071;     Ultrasound                  __8_  mins  81033  Iontophoresis                 0    mins  87560    Electrical Stimulation     0    mins  93330 ([id number])  Traction                         _0  mins  22529     Evaluation Time:     20   mins  Timed Treatment:   48   mins   Total Treatment:     80   mins    PT: Gabbie Tompkins PT     License Number: [place] [id number]  Electronically signed by Gabbie Tompkins PT, 11/10/22, 11:01 AM EST    Certification Period: 11/10/2022 thru 2/7/2023  I certify that the therapy services are furnished while this patient is under my care.  The services outlined above are required by this patient, and will be reviewed every 90 days.         Physician Signature:__________________________________________________    PHYSICIAN: Eliseo Isaac APRN      DATE:     Please sign and return via fax to [email or web address] . Thank you, [facility].    PT SIGNATURE: Gabbie Tompkins PT   [place] LICENSE:  [id number]

## 2022-11-15 ENCOUNTER — TREATMENT (OUTPATIENT)
Dept: PHYSICAL THERAPY | Facility: CLINIC | Age: 37
End: 2022-11-15

## 2022-11-15 DIAGNOSIS — M76.899 QUADRICEPS TENDONITIS: Primary | ICD-10-CM

## 2022-11-15 DIAGNOSIS — M25.562 CHRONIC PAIN OF LEFT KNEE: ICD-10-CM

## 2022-11-15 DIAGNOSIS — Z74.09 IMPAIRED FUNCTIONAL MOBILITY AND ACTIVITY TOLERANCE: ICD-10-CM

## 2022-11-15 DIAGNOSIS — G89.29 CHRONIC PAIN OF LEFT KNEE: ICD-10-CM

## 2022-11-15 PROCEDURE — 97035 APP MDLTY 1+ULTRASOUND EA 15: CPT | Performed by: PHYSICAL THERAPIST

## 2022-11-15 PROCEDURE — 97530 THERAPEUTIC ACTIVITIES: CPT | Performed by: PHYSICAL THERAPIST

## 2022-11-15 PROCEDURE — 97140 MANUAL THERAPY 1/> REGIONS: CPT | Performed by: PHYSICAL THERAPIST

## 2022-11-15 PROCEDURE — 97110 THERAPEUTIC EXERCISES: CPT | Performed by: PHYSICAL THERAPIST

## 2022-11-15 NOTE — PROGRESS NOTES
Physical Therapy Daily Treatment Note  [facility]  [address], [address]  [place] [address]    Patient: Neli Bravo   : 1985  Referring practitioner: CAMILLE Lombardi  Today's Date: 11/15/2022  Patient seen for 2 sessions    Visit Diagnoses:    ICD-10-CM ICD-9-CM   1. Quadriceps tendonitis  M76.899 727.09   2. Chronic pain of left knee  M25.562 719.46    G89.29 338.29   3. Impaired functional mobility and activity tolerance  Z74.09 V49.89       Subjective   Neli Bravo reports: that her pain has decreased and her activity tolerances have increased.  Able to climb steps with less pain but it is still present.  Still does have discomfort getting up./down from the floor.      Objective   Slight tenderness in the distal left quad tendon    No palpable defect    See Exercise, Manual, and Modality Logs for complete treatment.     Patient Education: new step down exercises    Assessment/Plan  Much improved as pain and tenderness have decreased and activity tolerances have increased.     Progress strengthening /stabilization /functional activity           Timed:  Manual Therapy:    10     mins  99456;  Therapeutic Exercise:    15     mins  71954;     Neuromuscular Leland:    0    mins  69871;    Therapeutic Activity:     15     mins  44783;     Ultrasound:      8     mins  00183;    Iontophoresis              __0_   mins  Dry Needling               _____   mins        Untimed:  Electrical Stimulation:     0    mins  39475 ( );  Mechanical Traction:             mins  26075;   Paraffin                       _____  mins     Timed Treatment:   48   mins   Total Treatment:     60   mins    Gabbie Tompkins PT  [place] License # [id number]  Physical Therapist    Electronically signed by Gabbie Tompkins PT, 11/15/22, 10:38 AM EST

## 2022-11-21 ENCOUNTER — TREATMENT (OUTPATIENT)
Dept: PHYSICAL THERAPY | Facility: CLINIC | Age: 37
End: 2022-11-21

## 2022-11-21 DIAGNOSIS — Z74.09 IMPAIRED FUNCTIONAL MOBILITY AND ACTIVITY TOLERANCE: ICD-10-CM

## 2022-11-21 DIAGNOSIS — M25.562 CHRONIC PAIN OF LEFT KNEE: ICD-10-CM

## 2022-11-21 DIAGNOSIS — M76.899 QUADRICEPS TENDONITIS: Primary | ICD-10-CM

## 2022-11-21 DIAGNOSIS — G89.29 CHRONIC PAIN OF LEFT KNEE: ICD-10-CM

## 2022-11-21 PROCEDURE — 97140 MANUAL THERAPY 1/> REGIONS: CPT | Performed by: PHYSICAL THERAPIST

## 2022-11-21 PROCEDURE — 97035 APP MDLTY 1+ULTRASOUND EA 15: CPT | Performed by: PHYSICAL THERAPIST

## 2022-11-21 PROCEDURE — 97110 THERAPEUTIC EXERCISES: CPT | Performed by: PHYSICAL THERAPIST

## 2022-11-21 NOTE — PROGRESS NOTES
Physical Therapy Daily Treatment Note    Visit Diagnoses:    ICD-10-CM ICD-9-CM   1. Quadriceps tendonitis  M76.899 727.09   2. Chronic pain of left knee  M25.562 719.46    G89.29 338.29   3. Impaired functional mobility and activity tolerance  Z74.09 V49.89       VISIT#: 3      Neli Bravo reports: Her L knee is doing better. The pain is pretty much gone unless she bends down for a long time. Getting up from the floor is still a struggle.   Current Pain Level:    0/10; Worst:   2/10; Best:  0/10  Location Of Pain: Left quad tendon tightness/pain  Quality of Pain: tight, dull ache and discomfort  Response to Previous Session: good. No issues  Functional Deficits/Irritating Factors: squatting (putting pressure on the knee, push up from the floor)  Progression: improving  Compliance with HEP Reported: Yes    Objective  Presents: normal gait pattern  Increased sets/reps of:  4 way hip   Increased resistance on:  SLR  Added to Program: none    No swelling or tenderness in the affected quad tendon area.       See Exercise, Manual, and Modality Logs for complete treatment.     Patient Education: Pt was educated on exercise biomechanical correctness, intensity, and speed.     Assessment:  Pt progressing nicely. No tenderness or pain present today. Main functional difficulty is getting up from the floor and direct wt bearing on her knee.  Pt will continue to benefit from skilled PT interventions to address current functional deficits and impairments.       Plan: Progress to/Continue with current program.       Timed:  Manual Therapy:            8_    mins  45344;  Ultrasound:                     8      mins  19705;   Therapeutic Exercise:    30     mins  67160;     Neuromuscular Leland:   0     mins  68687;    Therapeutic Activity:      0     mins  03989;      Iontophoresis              _0__   mins  Dry Needling               _0____   mins         Untimed:  Work Conditioning: __0__ mins 10095  Electrical Stimulation:    0     mins  21685 ( );  Mechanical Traction:       0        mins  74151;   Paraffin                       __0___  mins   Ice/Heat: __0__ mins      Timed Treatment:   46   mins   Total Treatment:     49   mins          MITCHELL Ruiz License # [id number]  Physical Therapist Assistant

## 2022-11-30 ENCOUNTER — TREATMENT (OUTPATIENT)
Dept: PHYSICAL THERAPY | Facility: CLINIC | Age: 37
End: 2022-11-30

## 2022-11-30 DIAGNOSIS — M76.899 QUADRICEPS TENDONITIS: Primary | ICD-10-CM

## 2022-11-30 DIAGNOSIS — Z74.09 IMPAIRED FUNCTIONAL MOBILITY AND ACTIVITY TOLERANCE: ICD-10-CM

## 2022-11-30 DIAGNOSIS — G89.29 CHRONIC PAIN OF LEFT KNEE: ICD-10-CM

## 2022-11-30 DIAGNOSIS — M25.562 CHRONIC PAIN OF LEFT KNEE: ICD-10-CM

## 2022-11-30 PROCEDURE — 97035 APP MDLTY 1+ULTRASOUND EA 15: CPT | Performed by: PHYSICAL THERAPIST

## 2022-11-30 PROCEDURE — 97110 THERAPEUTIC EXERCISES: CPT | Performed by: PHYSICAL THERAPIST

## 2022-11-30 PROCEDURE — 97140 MANUAL THERAPY 1/> REGIONS: CPT | Performed by: PHYSICAL THERAPIST

## 2022-11-30 NOTE — PROGRESS NOTES
Physical Therapy Daily Treatment Note - Discharge Note  [facility]  [address], [address]  [place] [address]    Patient: Neli Bravo   : 1985  Referring practitioner: CAMILLE Lombardi  Today's Date: 2022  Patient seen for 4 sessions    Visit Diagnoses:    ICD-10-CM ICD-9-CM   1. Quadriceps tendonitis  M76.899 727.09   2. Chronic pain of left knee  M25.562 719.46    G89.29 338.29   3. Impaired functional mobility and activity tolerance  Z74.09 V49.89       Subjective   Neli Bravo reports: that she feels much better/.  Is now able to go up/down steps without pain.  Is able to carry light things without pain.  Still hager shave a bit of trouble getting up/down off of the floor but this is much better as well.      Objective   No tenderness in quad tendon today    Full knee AROM    No pain with resisted knee extension    No palpable tenderness in the quad tendon    See Exercise, Manual, and Modality Logs for complete treatment.     Patient Education: cont HEP  Assessment/Plan  Patient has demonstrated signficant  improvement since the initiation of therapy.  The pain has  Nearly resolved.  The motion has returned to normal.  The activity tolerances have increased to near normal although does still have some discomfort with getting up from the floor. I feel that the patient would benefit from continuing her HEP but stopping formal therapy.       DC PT           Timed:  Manual Therapy:    12     mins  65587;  Therapeutic Exercise:    25/35     mins  91177;     Neuromuscular eLland:    0    mins  20003;    Therapeutic Activity:     0     mins  46735;     Ultrasound:      8     mins  26749;    Iontophoresis              __0_   mins  Dry Needling               _____   mins        Untimed:  Electrical Stimulation:     0    mins  65170 (MC );  Mechanical Traction:             mins  58610;   Paraffin                       _____  mins     Timed Treatment:    45   mins   Total Treatment:     60   mins    Gabbie Tompkins, PT  [place] License # [id number]  Physical Therapist    Electronically signed by Gabbie Tompkins, PT, 11/30/22, 9:20 AM EST

## 2022-12-05 ENCOUNTER — TELEPHONE (OUTPATIENT)
Dept: OBSTETRICS AND GYNECOLOGY | Facility: CLINIC | Age: 37
End: 2022-12-05

## 2022-12-05 NOTE — TELEPHONE ENCOUNTER
DEENA PALOMO    [phone]    PT NEEDS A RETURN TO WORK LETTER     PLACE ON MY CHART    ALSO PLEASE -[phone]    WILL NOT BE RETURNING TO WORK UNTIL 12/12/22

## 2022-12-05 NOTE — TELEPHONE ENCOUNTER
Dr Schilling approved the return to work note with no restrictions so I wrote it. I then faxed it to the fax number and sent in My Chart under letters to her. I then called to let Neli know it has been successfully faxed and is in My Chart under letters. Thank you.

## 2022-12-05 NOTE — TELEPHONE ENCOUNTER
HUB call.  22, PP 10/28/22. Requesting return to work letter 22. Any restrictions and ok to give to her? Thank you.

## 2022-12-06 ENCOUNTER — OFFICE VISIT (OUTPATIENT)
Dept: FAMILY MEDICINE CLINIC | Facility: CLINIC | Age: 37
End: 2022-12-06

## 2022-12-06 VITALS
HEART RATE: 80 BPM | WEIGHT: 188.5 LBS | TEMPERATURE: 98.2 F | DIASTOLIC BLOOD PRESSURE: 82 MMHG | OXYGEN SATURATION: 98 % | SYSTOLIC BLOOD PRESSURE: 110 MMHG | HEIGHT: 66 IN | BODY MASS INDEX: 30.29 KG/M2

## 2022-12-06 DIAGNOSIS — R12 HEARTBURN: ICD-10-CM

## 2022-12-06 DIAGNOSIS — E11.9 TYPE 2 DIABETES MELLITUS WITHOUT COMPLICATION, WITHOUT LONG-TERM CURRENT USE OF INSULIN: Primary | ICD-10-CM

## 2022-12-06 DIAGNOSIS — J30.9 ALLERGIC RHINITIS, UNSPECIFIED SEASONALITY, UNSPECIFIED TRIGGER: ICD-10-CM

## 2022-12-06 DIAGNOSIS — Z13.220 ENCOUNTER FOR LIPID SCREENING FOR CARDIOVASCULAR DISEASE: ICD-10-CM

## 2022-12-06 DIAGNOSIS — Z13.6 ENCOUNTER FOR LIPID SCREENING FOR CARDIOVASCULAR DISEASE: ICD-10-CM

## 2022-12-06 DIAGNOSIS — D64.9 ANEMIA, UNSPECIFIED TYPE: ICD-10-CM

## 2022-12-06 PROCEDURE — 99214 OFFICE O/P EST MOD 30 MIN: CPT | Performed by: NURSE PRACTITIONER

## 2022-12-06 RX ORDER — METFORMIN HYDROCHLORIDE 500 MG/1
500 TABLET, EXTENDED RELEASE ORAL
COMMUNITY
End: 2022-12-08 | Stop reason: SDUPTHER

## 2022-12-06 NOTE — PROGRESS NOTES
Subjective   Neli Bravo is a 37 y.o. female.     Chief Complaint   Patient presents with   • Diabetes     Follow up       History of Present Illness   Patient presents for follow up DM2: has been taking Metformin daily; last A1c 5.2%; previously taking Humulin N Insulin twice daily with meals when pregnant; delivered 9/14/22; discharged home on Metformin; no longer taking insulin; has been taking Metformin extended release 500 mg once daily; has not been monitoring blood sugar; has felt good; has been watching intake of carbs/sugars; had been walking some, but not much since change in weather; no numbness or tingling; no recent eye exam.    Also, had injured knee during pregnancy; had strained knee; has been doing PT and has helped; knee will feel tight with change in weather; has been doing home exercises from PT.       The following portions of the patient's history were reviewed and updated as appropriate: allergies, current medications, past family history, past medical history, past social history, past surgical history and problem list.    Current Outpatient Medications on File Prior to Visit   Medication Sig   • Lancets misc 1 each 4 (Four) Times a Day.   • metFORMIN ER (GLUCOPHAGE-XR) 500 MG 24 hr tablet Take 500 mg by mouth Daily With Breakfast.   • prenatal vitamin (prenatal, CLASSIC, vitamin) tablet Take 1 tablet by mouth Daily.   • valACYclovir (VALTREX) 500 MG tablet Take 500 mg by mouth Daily As Needed.     No current facility-administered medications on file prior to visit.        Past Medical History:   Diagnosis Date   • Acne    • Allergic rhinitis    • Anxiety 01/12/2021   • Injury of back    • Migraine headache    • Positive depression screening 01/12/2021       Past Surgical History:   Procedure Laterality Date   • WISDOM TOOTH EXTRACTION         Family History   Problem Relation Age of Onset   • Heart disease Mother         has AICD due to ventricular arrhythmia   • Arrhythmia Mother    •  Breast cancer Paternal Grandmother 70   • Diabetes Maternal Grandmother    • Heart attack Maternal Grandmother         in her 40s   • Hypertension Maternal Grandfather        Social History     Socioeconomic History   • Marital status: Single   Tobacco Use   • Smoking status: Former     Packs/day: 0.50     Types: Cigarettes     Quit date: 2020     Years since quittin.9   • Smokeless tobacco: Never   • Tobacco comments:     previously smoked less than 1/2 pack per day for 3 years   Substance and Sexual Activity   • Alcohol use: Not Currently     Comment: occasional   • Drug use: No   • Sexual activity: Yes     Partners: Male     Birth control/protection: None     Comment: breastfeeding       Review of Systems   Constitutional: Negative for appetite change, chills, fatigue, fever, unexpected weight gain and unexpected weight loss.   HENT: Negative for ear pain and sore throat (some at times with drainage). Postnasal drip: some; takes Mucinex as needed and helps.    Eyes: Negative for blurred vision.   Respiratory: Negative for cough and shortness of breath.    Cardiovascular: Negative for chest pain, palpitations and leg swelling.   Gastrointestinal: Negative for abdominal pain, blood in stool, constipation, diarrhea, GERD (mild; takes TUMs as needed; has not taken daily med since breastfeeding) and indigestion.   Endocrine: Negative for cold intolerance, heat intolerance and polydipsia.   Genitourinary: Negative for dysuria and frequency.   Musculoskeletal: Negative for arthralgias (see HPI) and back pain.   Skin: Negative for rash.   Neurological: Negative for dizziness and syncope. Light-headedness: mild at times with position changes. Headache: some recently; no change in vision; no nausea/vomiting with headaches.   Hematological: Does not bruise/bleed easily.   Psychiatric/Behavioral: Negative for depressed mood. The patient is not nervous/anxious.        Objective   Vitals:    22 0907   BP: 110/82  "  BP Location: Right arm   Patient Position: Sitting   Cuff Size: Adult   Pulse: 80   Temp: 98.2 °F (36.8 °C)   SpO2: 98%   Weight: 85.5 kg (188 lb 8 oz)   Height: 167.6 cm (65.98\")     Body mass index is 30.44 kg/m².    Physical Exam  Vitals and nursing note reviewed.   Constitutional:       General: She is not in acute distress.     Appearance: She is well-developed and well-groomed. She is not diaphoretic.   HENT:      Head: Normocephalic.      Right Ear: External ear normal. No decreased hearing noted. Right ear middle ear effusion: mild. Tympanic membrane is not erythematous.      Left Ear: Tympanic membrane and external ear normal. No decreased hearing noted.      Nose: Nose normal.      Right Sinus: No maxillary sinus tenderness or frontal sinus tenderness.      Left Sinus: No maxillary sinus tenderness or frontal sinus tenderness.      Mouth/Throat:      Mouth: Mucous membranes are moist.      Pharynx: No oropharyngeal exudate or posterior oropharyngeal erythema.   Eyes:      Conjunctiva/sclera: Conjunctivae normal.   Neck:      Vascular: No carotid bruit.   Cardiovascular:      Rate and Rhythm: Normal rate and regular rhythm.      Pulses: Normal pulses.      Heart sounds: Normal heart sounds. No murmur heard.  Pulmonary:      Effort: Pulmonary effort is normal. No respiratory distress.      Breath sounds: Normal breath sounds.   Abdominal:      General: Bowel sounds are normal.      Palpations: Abdomen is soft. There is no hepatomegaly or splenomegaly.      Tenderness: There is no abdominal tenderness. There is no guarding.   Musculoskeletal:      Cervical back: Normal range of motion and neck supple.      Right lower leg: No edema.      Left lower leg: No edema.   Lymphadenopathy:      Cervical: No cervical adenopathy.   Skin:     General: Skin is warm and dry.      Findings: No rash.   Neurological:      Mental Status: She is alert and oriented to person, place, and time.      Gait: Gait is intact. "   Psychiatric:         Mood and Affect: Mood normal.         Behavior: Behavior normal.         Thought Content: Thought content normal.         Cognition and Memory: Cognition normal.         Judgment: Judgment normal.         Lab Results   Component Value Date    WBC 17.23 (H) 09/15/2022    RBC 3.44 (L) 09/15/2022    HGB 10.8 (L) 09/15/2022    HCT 31.2 (L) 09/15/2022    MCV 90.7 09/15/2022    MCH 31.4 09/15/2022    MCHC 34.6 09/15/2022    RDW 12.7 09/15/2022    RDWSD 41.6 09/15/2022    MPV 9.5 09/15/2022     09/15/2022    NEUTRORELPCT 78.6 (H) 09/15/2022    LYMPHORELPCT 13.5 (L) 09/15/2022    MONORELPCT 6.6 09/15/2022    EOSRELPCT 0.2 (L) 09/15/2022    BASORELPCT 0.3 09/15/2022    AUTOIGPER 0.8 (H) 09/15/2022    NEUTROABS 13.57 (H) 09/15/2022    LYMPHSABS 2.32 09/15/2022    MONOSABS 1.13 (H) 09/15/2022    EOSABS 0.03 09/15/2022    BASOSABS 0.05 09/15/2022    AUTOIGNUM 0.13 (H) 09/15/2022    NRBC 0.0 09/15/2022     Lab Results   Component Value Date    GLUCOSE 82 09/13/2022    BUN 9 09/06/2022    CREATININE 0.64 09/06/2022    EGFRIFNONA 101 11/10/2021    EGFRIFAFRI 117 11/10/2021    BCR 14 09/06/2022    K 4.2 09/06/2022    CO2 19 (L) 09/06/2022    CALCIUM 9.6 09/06/2022    PROTENTOTREF 5.7 (L) 09/06/2022    ALBUMIN 3.6 (L) 09/06/2022    LABIL2 1.7 09/06/2022    AST 27 09/06/2022    ALT 23 09/06/2022      Lab Results   Component Value Date    CHLPL 180 01/12/2021    TRIG 118 01/12/2021    HDL 71 (H) 01/12/2021    VLDL 21 01/12/2021    LDL 88 01/12/2021     Lab Results   Component Value Date    HGBA1C 5.2 09/06/2022         Assessment    Problem List Items Addressed This Visit     Heartburn    Current Assessment & Plan     Stable.  Continue TUMs as needed.         Relevant Orders    CBC & Differential    Allergic rhinitis    Current Assessment & Plan     May try nasal steroid, such as Flonase or Nasacort.         Type 2 diabetes mellitus without complication, without long-term current use of insulin (HCC) -  Primary    Current Assessment & Plan     Diabetes is pending lab results.   Continue current treatment regimen.  Reminded to get yearly retinal exam.  Diabetes will be reassessed in 6 months.  Continue Metformin daily.         Relevant Medications    metFORMIN ER (GLUCOPHAGE-XR) 500 MG 24 hr tablet    Other Relevant Orders    Comprehensive Metabolic Panel    Lipid Panel With LDL / HDL Ratio    Hemoglobin A1c    Microalbumin / Creatinine Urine Ratio - Urine, Clean Catch   Other Visit Diagnoses     Anemia, unspecified type        Relevant Orders    CBC & Differential    Encounter for lipid screening for cardiovascular disease        Relevant Orders    Lipid Panel With LDL / HDL Ratio             Return in about 6 months (around 6/6/2023) for Recheck.or sooner if problems or concerns.  Will send refill of Metformin to [facility] for 90 day supply pending lab results.     COVID-19 Precautions - Patient was compliant in wearing a mask. When I saw the patient, I used appropriate personal protective equipment (PPE) including mask, gloves, and eye shield (standard procedure).  Hand hygiene was completed before and after seeing the patient.

## 2022-12-06 NOTE — PATIENT INSTRUCTIONS
Monitor your blood sugar periodically before a meal and record results.  Continue to work on healthy diet and exercise.  May try nasal steroid, such as Flonase or Nasacort.  Follow up pending lab results.  Follow up in 6 months, or sooner if problems or concerns.

## 2022-12-07 LAB
ALBUMIN SERPL-MCNC: 5 G/DL (ref 3.5–5.2)
ALBUMIN/CREAT UR: 9 MG/G CREAT (ref 0–29)
ALBUMIN/GLOB SERPL: 2.2 G/DL
ALP SERPL-CCNC: 68 U/L (ref 39–117)
ALT SERPL-CCNC: 19 U/L (ref 1–33)
AST SERPL-CCNC: 15 U/L (ref 1–32)
BASOPHILS # BLD AUTO: 0.03 10*3/MM3 (ref 0–0.2)
BASOPHILS NFR BLD AUTO: 0.4 % (ref 0–1.5)
BILIRUB SERPL-MCNC: 1.1 MG/DL (ref 0–1.2)
BUN SERPL-MCNC: 12 MG/DL (ref 6–20)
BUN/CREAT SERPL: 15.6 (ref 7–25)
CALCIUM SERPL-MCNC: 9.6 MG/DL (ref 8.6–10.5)
CHLORIDE SERPL-SCNC: 100 MMOL/L (ref 98–107)
CHOLEST SERPL-MCNC: 201 MG/DL (ref 0–200)
CO2 SERPL-SCNC: 29.4 MMOL/L (ref 22–29)
CREAT SERPL-MCNC: 0.77 MG/DL (ref 0.57–1)
CREAT UR-MCNC: 87.7 MG/DL
EGFRCR SERPLBLD CKD-EPI 2021: 103.3 ML/MIN/1.73
EOSINOPHIL # BLD AUTO: 0.08 10*3/MM3 (ref 0–0.4)
EOSINOPHIL NFR BLD AUTO: 1.1 % (ref 0.3–6.2)
ERYTHROCYTE [DISTWIDTH] IN BLOOD BY AUTOMATED COUNT: 13 % (ref 12.3–15.4)
GLOBULIN SER CALC-MCNC: 2.3 GM/DL
GLUCOSE SERPL-MCNC: 140 MG/DL (ref 65–99)
HBA1C MFR BLD: 6.1 % (ref 4.8–5.6)
HCT VFR BLD AUTO: 37.1 % (ref 34–46.6)
HDLC SERPL-MCNC: 91 MG/DL (ref 40–60)
HGB BLD-MCNC: 12.7 G/DL (ref 12–15.9)
IMM GRANULOCYTES # BLD AUTO: 0.02 10*3/MM3 (ref 0–0.05)
IMM GRANULOCYTES NFR BLD AUTO: 0.3 % (ref 0–0.5)
LDLC SERPL CALC-MCNC: 99 MG/DL (ref 0–100)
LDLC/HDLC SERPL: 1.07 {RATIO}
LYMPHOCYTES # BLD AUTO: 2.35 10*3/MM3 (ref 0.7–3.1)
LYMPHOCYTES NFR BLD AUTO: 32.1 % (ref 19.6–45.3)
MCH RBC QN AUTO: 30.4 PG (ref 26.6–33)
MCHC RBC AUTO-ENTMCNC: 34.2 G/DL (ref 31.5–35.7)
MCV RBC AUTO: 88.8 FL (ref 79–97)
MICROALBUMIN UR-MCNC: 7.6 UG/ML
MONOCYTES # BLD AUTO: 0.45 10*3/MM3 (ref 0.1–0.9)
MONOCYTES NFR BLD AUTO: 6.1 % (ref 5–12)
NEUTROPHILS # BLD AUTO: 4.39 10*3/MM3 (ref 1.7–7)
NEUTROPHILS NFR BLD AUTO: 60 % (ref 42.7–76)
NRBC BLD AUTO-RTO: 0 /100 WBC (ref 0–0.2)
PLATELET # BLD AUTO: 327 10*3/MM3 (ref 140–450)
POTASSIUM SERPL-SCNC: 4.5 MMOL/L (ref 3.5–5.2)
PROT SERPL-MCNC: 7.3 G/DL (ref 6–8.5)
RBC # BLD AUTO: 4.18 10*6/MM3 (ref 3.77–5.28)
SODIUM SERPL-SCNC: 136 MMOL/L (ref 136–145)
TRIGL SERPL-MCNC: 63 MG/DL (ref 0–150)
VLDLC SERPL CALC-MCNC: 11 MG/DL (ref 5–40)
WBC # BLD AUTO: 7.32 10*3/MM3 (ref 3.4–10.8)

## 2022-12-07 NOTE — ASSESSMENT & PLAN NOTE
Diabetes is pending lab results.   Continue current treatment regimen.  Reminded to get yearly retinal exam.  Diabetes will be reassessed in 6 months.  Continue Metformin daily.

## 2022-12-08 DIAGNOSIS — E11.9 TYPE 2 DIABETES MELLITUS WITHOUT COMPLICATION, WITHOUT LONG-TERM CURRENT USE OF INSULIN: Primary | ICD-10-CM

## 2022-12-08 RX ORDER — METFORMIN HYDROCHLORIDE 500 MG/1
500 TABLET, EXTENDED RELEASE ORAL
Qty: 90 TABLET | Refills: 1 | Status: SHIPPED | OUTPATIENT
Start: 2022-12-08

## 2023-03-01 ENCOUNTER — TELEPHONE (OUTPATIENT)
Dept: OBSTETRICS AND GYNECOLOGY | Facility: CLINIC | Age: 38
End: 2023-03-01
Payer: COMMERCIAL

## 2023-03-01 RX ORDER — NORETHINDRONE ACETATE AND ETHINYL ESTRADIOL, ETHINYL ESTRADIOL AND FERROUS FUMARATE 1MG-10(24)
1 KIT ORAL DAILY
Qty: 28 TABLET | Refills: 11 | Status: SHIPPED | OUTPATIENT
Start: 2023-03-01

## 2023-03-01 NOTE — TELEPHONE ENCOUNTER
Patient would like to start birth control  Lo Loestrin Fe 1 MG-10 MCG / 10 MCG tablet .      Pharmacy:  [facility] #[id number] [facility] [address] [facility] [facility] [phone] [facility] [phone] FX      Please advise,  Thank you

## 2023-03-01 NOTE — TELEPHONE ENCOUNTER
This prescription has been sent.  Please confirm with the patient that she is no longer breast-feeding her baby.  This medication is not safe in breast-feeding.  Thank you.

## 2023-04-07 ENCOUNTER — TELEPHONE (OUTPATIENT)
Dept: OBSTETRICS AND GYNECOLOGY | Facility: CLINIC | Age: 38
End: 2023-04-07
Payer: COMMERCIAL

## 2023-04-07 RX ORDER — VALACYCLOVIR HYDROCHLORIDE 500 MG/1
500 TABLET, FILM COATED ORAL 2 TIMES DAILY
Qty: 18 TABLET | Refills: 3 | Status: SHIPPED | OUTPATIENT
Start: 2023-04-07 | End: 2023-04-10

## 2023-04-07 RX ORDER — FLUCONAZOLE 150 MG/1
150 TABLET ORAL ONCE
Qty: 1 TABLET | Refills: 0 | Status: SHIPPED | OUTPATIENT
Start: 2023-04-07 | End: 2023-04-07

## 2023-04-07 NOTE — TELEPHONE ENCOUNTER
Pt requesting Valtrex and oral medication for yeast infection. Pt's pharmacy:    [id number] [place], [place] - [address] AT [facility] & [facility] [phone] [facility] [phone] FX    Please advise.     Tim

## 2023-04-27 ENCOUNTER — OFFICE VISIT (OUTPATIENT)
Dept: FAMILY MEDICINE CLINIC | Facility: CLINIC | Age: 38
End: 2023-04-27
Payer: COMMERCIAL

## 2023-04-27 VITALS
TEMPERATURE: 97.7 F | HEART RATE: 101 BPM | OXYGEN SATURATION: 98 % | DIASTOLIC BLOOD PRESSURE: 80 MMHG | WEIGHT: 188 LBS | BODY MASS INDEX: 30.36 KG/M2 | SYSTOLIC BLOOD PRESSURE: 108 MMHG

## 2023-04-27 DIAGNOSIS — B08.4 HAND, FOOT AND MOUTH DISEASE: Primary | ICD-10-CM

## 2023-04-27 PROCEDURE — 99213 OFFICE O/P EST LOW 20 MIN: CPT | Performed by: INTERNAL MEDICINE

## 2023-04-27 NOTE — PROGRESS NOTES
Subjective   Neli Bravo is a 37 y.o. female.     Chief Complaint   Patient presents with   • hand, foot, mouth       Rash  This is a new problem. The current episode started yesterday. The problem has been rapidly worsening since onset. The rash is characterized by blistering, burning, dryness, pain, redness and itchiness. She was exposed to an ill contact. Associated symptoms include anorexia, congestion, fatigue, a fever, joint pain and a sore throat. Pertinent negatives include no cough, diarrhea, eye pain, facial edema, nail changes, rhinorrhea, shortness of breath or vomiting.      Answers for HPI/ROS submitted by the patient on 4/27/2023  What is the primary reason for your visit?: Rash    Daughter with hand, foot and mouth and now mother is now having sore throat, rash on palms and soles and a low grade fever.    The following portions of the patient's history were reviewed and updated as appropriate: allergies, current medications, past family history, past medical history, past social history, past surgical history and problem list.    Depression Screen:      2/8/2022    10:01 AM   PHQ-2/PHQ-9 Depression Screening   Retired PHQ-9 Total Score 0   Retired Total Score 0       Past Medical History:   Diagnosis Date   • Acne    • Allergic rhinitis    • Anxiety 01/12/2021   • Diabetes mellitus    • Injury of back    • Migraine headache    • Positive depression screening 01/12/2021       Past Surgical History:   Procedure Laterality Date   • WISDOM TOOTH EXTRACTION         Family History   Problem Relation Age of Onset   • Heart disease Mother         has AICD due to ventricular arrhythmia   • Arrhythmia Mother    • Breast cancer Paternal Grandmother 70   • Diabetes Maternal Grandmother    • Heart attack Maternal Grandmother         in her 40s   • Hypertension Maternal Grandfather        Social History     Socioeconomic History   • Marital status: Single   Tobacco Use   • Smoking status: Former     Packs/day:  0.50     Types: Cigarettes     Quit date: 1/12/2020     Years since quitting: 3.2   • Smokeless tobacco: Never   • Tobacco comments:     previously smoked less than 1/2 pack per day for 3 years   Substance and Sexual Activity   • Alcohol use: Not Currently     Comment: occasional   • Drug use: No   • Sexual activity: Yes     Partners: Male     Birth control/protection: Pill, None     Comment: breastfeeding       Current Outpatient Medications   Medication Sig Dispense Refill   • Lancets misc 1 each 4 (Four) Times a Day. 100 each 1   • metFORMIN ER (GLUCOPHAGE-XR) 500 MG 24 hr tablet Take 1 tablet by mouth Daily With Breakfast. 90 tablet 1   • Norethin-Eth Estrad-Fe Biphas (Lo Loestrin Fe) 1 MG-10 MCG / 10 MCG tablet Take 1 tablet by mouth Daily. 28 tablet 11     No current facility-administered medications for this visit.       Review of Systems   Constitutional: Positive for fatigue and fever.   HENT: Positive for congestion and sore throat. Negative for rhinorrhea.    Eyes: Negative for pain.   Respiratory: Negative for cough and shortness of breath.    Gastrointestinal: Positive for anorexia. Negative for diarrhea and vomiting.   Musculoskeletal: Positive for joint pain.   Skin: Positive for rash. Negative for nail changes.       Objective   /80 (BP Location: Left arm, Patient Position: Sitting, Cuff Size: Adult)   Pulse 101   Temp 97.7 °F (36.5 °C) (Temporal)   Wt 85.3 kg (188 lb)   SpO2 98%   BMI 30.36 kg/m²     Physical Exam  Vitals and nursing note reviewed.   Constitutional:       General: She is not in acute distress.     Appearance: She is well-developed. She is not diaphoretic.   HENT:      Head: Normocephalic and atraumatic.      Right Ear: External ear normal.      Left Ear: External ear normal.      Nose: Nose normal.      Mouth/Throat:      Comments: OP with wit pillar sores bilaterally and some small blisters on roof of mouth.  Eyes:      Conjunctiva/sclera: Conjunctivae normal.       Pupils: Pupils are equal, round, and reactive to light.   Neck:      Thyroid: No thyromegaly.      Trachea: No tracheal deviation.   Cardiovascular:      Rate and Rhythm: Normal rate and regular rhythm.      Heart sounds: Normal heart sounds. No murmur heard.    No friction rub. No gallop.   Pulmonary:      Effort: Pulmonary effort is normal. No respiratory distress.      Breath sounds: Normal breath sounds.   Abdominal:      General: Bowel sounds are normal.      Palpations: Abdomen is soft. There is no mass.      Tenderness: There is no abdominal tenderness. There is no guarding.   Musculoskeletal:         General: Normal range of motion.      Cervical back: Normal range of motion and neck supple.   Lymphadenopathy:      Cervical: No cervical adenopathy.   Skin:     General: Skin is warm and dry.      Capillary Refill: Capillary refill takes less than 2 seconds.      Findings: No rash.      Comments: Palms and right first toe with blister lesions but no discharge or drainage.   Neurological:      Mental Status: She is alert and oriented to person, place, and time.      Motor: No abnormal muscle tone.      Deep Tendon Reflexes: Reflexes normal.   Psychiatric:         Behavior: Behavior normal.         Thought Content: Thought content normal.         Judgment: Judgment normal.         No results found for this or any previous visit (from the past 2016 hour(s)).  Assessment & Plan   Diagnoses and all orders for this visit:    1. Hand, foot and mouth disease (Primary)    Hydrocortisone cream and oral benadryl PRN.  Ibuprofen PRN and cold compresses.  Off work till return to work on 5/1/23.       · COVID-19 Precautions - Patient was compliant in wearing a mask. When I saw the patient, I used appropriate personal protective equipment (PPE) including mask and eye shield (standard procedure).  Additionally, I used gown and gloves if indicated.  Hand hygiene was completed before and after seeing the patient.  · Dictated  utilizing Dragon Dictation

## 2023-04-27 NOTE — LETTER
April 27, 2023     Patient: Neli Bravo   YOB: 1985   Date of Visit: 4/27/2023       To Whom It May Concern:    It is my medical opinion that Neli Bravo may return to work on 5/1/23.         Sincerely,        Bartolo Oh MD    CC: No Recipients

## 2023-07-14 PROBLEM — R51.9 NONINTRACTABLE EPISODIC HEADACHE: Status: RESOLVED | Noted: 2021-02-15 | Resolved: 2023-07-14

## 2023-07-14 PROBLEM — Z34.90 PREGNANCY: Status: RESOLVED | Noted: 2022-07-19 | Resolved: 2023-07-14

## 2023-07-14 PROBLEM — O24.414 INSULIN CONTROLLED GESTATIONAL DIABETES MELLITUS (GDM) IN THIRD TRIMESTER: Status: RESOLVED | Noted: 2022-09-07 | Resolved: 2023-07-14

## 2023-07-14 PROBLEM — R23.3 EASY BRUISING: Status: RESOLVED | Noted: 2021-01-12 | Resolved: 2023-07-14

## 2023-07-14 PROBLEM — E66.811 CLASS 1 OBESITY WITH SERIOUS COMORBIDITY AND BODY MASS INDEX (BMI) OF 31.0 TO 31.9 IN ADULT: Status: ACTIVE | Noted: 2023-07-14

## 2023-07-14 PROBLEM — O24.919 DIABETES MELLITUS DURING PREGNANCY TREATED WITH INSULIN: Status: RESOLVED | Noted: 2022-09-13 | Resolved: 2023-07-14

## 2023-07-14 PROBLEM — Z20.822 EXPOSURE TO COVID-19 VIRUS: Status: RESOLVED | Noted: 2022-07-16 | Resolved: 2023-07-14

## 2023-07-14 PROBLEM — B37.31 VAGINAL CANDIDIASIS: Status: RESOLVED | Noted: 2021-11-10 | Resolved: 2023-07-14

## 2023-07-14 PROBLEM — Z79.4 DIABETES MELLITUS DURING PREGNANCY TREATED WITH INSULIN: Status: RESOLVED | Noted: 2022-09-13 | Resolved: 2023-07-14

## 2023-07-14 PROBLEM — J01.90 ACUTE NON-RECURRENT SINUSITIS: Status: RESOLVED | Noted: 2022-07-16 | Resolved: 2023-07-14

## 2023-07-14 PROBLEM — J02.9 SORE THROAT: Status: RESOLVED | Noted: 2022-07-16 | Resolved: 2023-07-14

## 2023-07-14 PROBLEM — U07.1 COVID-19 VIRUS INFECTION: Status: RESOLVED | Noted: 2022-07-19 | Resolved: 2023-07-14

## 2023-07-14 PROBLEM — E66.9 CLASS 1 OBESITY WITH SERIOUS COMORBIDITY AND BODY MASS INDEX (BMI) OF 31.0 TO 31.9 IN ADULT: Status: ACTIVE | Noted: 2023-07-14

## 2023-10-19 ENCOUNTER — TELEPHONE (OUTPATIENT)
Dept: OBSTETRICS AND GYNECOLOGY | Facility: CLINIC | Age: 38
End: 2023-10-19
Payer: COMMERCIAL

## 2023-10-19 NOTE — TELEPHONE ENCOUNTER
Patient is needing the generic of Lo Loestrin Fe if possible, she got new insurance and the name brand is costing her $200. Can this be sent to her pharmacy?    Please advise, thanks!    Pharmacy confirmed - [facility] #[id number] [place], [place] - [address] AT [facility] [facility] [phone] [facility] [phone] FX

## 2023-10-19 NOTE — TELEPHONE ENCOUNTER
Please contact the patient and let her know that there is not a direct generic for low Loestrin.  There are generic 20 mcg pills if the patient would like 1 of those.  Please let me know.  Thank you.

## 2023-10-20 RX ORDER — NORETHINDRONE ACETATE AND ETHINYL ESTRADIOL 1MG-20(21)
1 KIT ORAL DAILY
Qty: 28 TABLET | Refills: 12 | Status: SHIPPED | OUTPATIENT
Start: 2023-10-20 | End: 2024-10-19

## 2023-12-19 ENCOUNTER — OFFICE VISIT (OUTPATIENT)
Dept: OBSTETRICS AND GYNECOLOGY | Facility: CLINIC | Age: 38
End: 2023-12-19
Payer: COMMERCIAL

## 2023-12-19 VITALS
HEIGHT: 66 IN | BODY MASS INDEX: 30.63 KG/M2 | WEIGHT: 190.6 LBS | DIASTOLIC BLOOD PRESSURE: 64 MMHG | SYSTOLIC BLOOD PRESSURE: 116 MMHG

## 2023-12-19 DIAGNOSIS — N39.3 SUI (STRESS URINARY INCONTINENCE, FEMALE): ICD-10-CM

## 2023-12-19 DIAGNOSIS — Z00.00 ANNUAL PHYSICAL EXAM: Primary | ICD-10-CM

## 2023-12-19 RX ORDER — NORETHINDRONE ACETATE AND ETHINYL ESTRADIOL AND FERROUS FUMARATE 1MG-20(24)
1 KIT ORAL DAILY
Qty: 90 TABLET | Refills: 3 | Status: SHIPPED | OUTPATIENT
Start: 2023-12-19 | End: 2024-12-18

## 2023-12-19 NOTE — PROGRESS NOTES
CARMEN Bravo  is a 38 y.o. female who presents for 2 reasons.  First, she would like to have a routine gynecologic exam.  Overall, she is feeling well.  Bowels and bladder are functioning normally.  Cycles are regular and predictable.  The patient is using oral contraceptive pills and she is very satisfied with these.  Next, she continues to leak urine with laughing, coughing, sneezing and lifting.  This has not improved since her baby was born.    Chief Complaint   Patient presents with    Gynecologic Exam     Patient is here for annual pap         Past Medical History:   Diagnosis Date    Acne     Allergic rhinitis     Anxiety 01/12/2021    COVID-19 virus infection 07/19/2022    Diabetes mellitus     Diabetes mellitus during pregnancy treated with insulin 09/13/2022    Injury of back     Insulin controlled gestational diabetes mellitus (GDM) in third trimester 09/07/2022    Migraine headache     Positive depression screening 01/12/2021       Past Surgical History:   Procedure Laterality Date    WISDOM TOOTH EXTRACTION         Social History     Socioeconomic History    Marital status: Single   Tobacco Use    Smoking status: Former     Packs/day: .5     Types: Cigarettes     Quit date: 1/12/2020     Years since quitting: 3.9    Smokeless tobacco: Never    Tobacco comments:     previously smoked less than 1/2 pack per day for 3 years   Substance and Sexual Activity    Alcohol use: Not Currently     Comment: occasional    Drug use: No    Sexual activity: Yes     Partners: Male     Birth control/protection: Pill       The following portions of the patient's history were reviewed and updated as appropriate: allergies, current medications, past family history, past medical history, past social history, past surgical history and problem list.    Review of Systems     This is positive for urinary incontinence.  It is negative for fever or chills.  Negative for nausea or vomiting.  Negative for unexplained weight  change.  All other systems are reviewed and are negative.    Physical Exam  Vitals and nursing note reviewed.   Constitutional:       Appearance: She is well-developed.   HENT:      Head: Normocephalic and atraumatic.   Cardiovascular:      Rate and Rhythm: Normal rate and regular rhythm.   Pulmonary:      Effort: Pulmonary effort is normal.      Breath sounds: Normal breath sounds. No wheezing or rales.   Chest:      Comments: The breasts are homogeneous.  There are no palpable lumps.  Nipple discharge and axillary adenopathy are absent.  Abdominal:      General: There is no distension.      Palpations: Abdomen is soft.      Tenderness: There is no abdominal tenderness.   Genitourinary:     Labia:         Right: No lesion.         Left: No lesion.       Vagina: Normal. No vaginal discharge.      Cervix: No cervical motion tenderness.      Adnexa:         Right: No mass or tenderness.          Left: No mass or tenderness.        Comments: Vaginal support is adequate    The uterus is normal in size.  It is mobile within the pelvis.  It is not tender.  Skin:     General: Skin is warm and dry.   Neurological:      Mental Status: She is alert and oriented to person, place, and time.         Assessment    Diagnoses and all orders for this visit:    1. Annual physical exam (Primary)    2. FREDERICK (stress urinary incontinence, female)    Other orders  -     norethindrone-ethinyl estradiol-ferrous fumarate (LOESTIN 24 FE) 1-20 MG-MCG(24) per tablet; Take 1 tablet by mouth Daily.  Dispense: 90 tablet; Refill: 3        Plan  Annual examination performed  Contraceptive counseling.  The patient is using a 20 mcg pill and is very satisfied with this.  She would like to continue.  Refills have been sent.  Stress urinary incontinence.  Counseled regarding the pathophysiology of this condition and options for its management.  I recommend pelvic floor muscle physical therapy.  We discussed this and the patient is considering.  She will  contact me if she chooses to proceed.    No follow-ups on file.    Social History     Tobacco Use   Smoking Status Former    Packs/day: .5    Types: Cigarettes    Quit date: 1/12/2020    Years since quitting: 3.9   Smokeless Tobacco Never   Tobacco Comments    previously smoked less than 1/2 pack per day for 3 years

## 2024-01-16 ENCOUNTER — OFFICE VISIT (OUTPATIENT)
Dept: FAMILY MEDICINE CLINIC | Facility: CLINIC | Age: 39
End: 2024-01-16
Payer: COMMERCIAL

## 2024-01-16 VITALS
HEART RATE: 72 BPM | DIASTOLIC BLOOD PRESSURE: 78 MMHG | SYSTOLIC BLOOD PRESSURE: 122 MMHG | HEIGHT: 66 IN | BODY MASS INDEX: 30.86 KG/M2 | WEIGHT: 192 LBS | OXYGEN SATURATION: 95 %

## 2024-01-16 DIAGNOSIS — E11.9 TYPE 2 DIABETES MELLITUS WITHOUT COMPLICATION, WITHOUT LONG-TERM CURRENT USE OF INSULIN: ICD-10-CM

## 2024-01-16 DIAGNOSIS — Z13.220 ENCOUNTER FOR LIPID SCREENING FOR CARDIOVASCULAR DISEASE: ICD-10-CM

## 2024-01-16 DIAGNOSIS — Z00.00 ENCOUNTER FOR ANNUAL PHYSICAL EXAM: Primary | ICD-10-CM

## 2024-01-16 DIAGNOSIS — R12 HEARTBURN: ICD-10-CM

## 2024-01-16 DIAGNOSIS — R51.9 NONINTRACTABLE HEADACHE, UNSPECIFIED CHRONICITY PATTERN, UNSPECIFIED HEADACHE TYPE: ICD-10-CM

## 2024-01-16 DIAGNOSIS — E66.9 CLASS 1 OBESITY WITH SERIOUS COMORBIDITY AND BODY MASS INDEX (BMI) OF 31.0 TO 31.9 IN ADULT, UNSPECIFIED OBESITY TYPE: ICD-10-CM

## 2024-01-16 DIAGNOSIS — Z13.6 ENCOUNTER FOR LIPID SCREENING FOR CARDIOVASCULAR DISEASE: ICD-10-CM

## 2024-01-16 PROCEDURE — 99395 PREV VISIT EST AGE 18-39: CPT | Performed by: NURSE PRACTITIONER

## 2024-01-16 RX ORDER — VALACYCLOVIR HYDROCHLORIDE 500 MG/1
1 TABLET, FILM COATED ORAL EVERY 12 HOURS SCHEDULED
COMMUNITY
Start: 2023-09-19

## 2024-01-16 NOTE — ASSESSMENT & PLAN NOTE
Patient's (Body mass index is 30.99 kg/m².) indicates that they are obese (BMI >30) with health conditions that include diabetes mellitus and GERD . Weight is unchanged. BMI  is above average; BMI management plan is completed. We discussed low calorie, low carb based diet program, portion control, and increasing exercise.

## 2024-01-16 NOTE — ASSESSMENT & PLAN NOTE
Diabetes is  pending lab results .   Continue current treatment regimen.  Regular aerobic exercise.  Reminded to get yearly retinal exam.  Diabetes will be reassessed  4 months .  Continue Metformin daily.

## 2024-01-16 NOTE — PROGRESS NOTES
Subjective   Neli Bravo is a 38 y.o. female.     Chief Complaint   Patient presents with    Annual Exam       History of Present Illness   Patient presents for CPE with fasting labs; pretty healthy diet; not much exercise; regular dental visits; no recent eye exam, no vision correction; no problems hearing; immunizations: declines flu, COVID-19 and PCV20 at this time; sees Dr. Schilling for female care; last PAP 12/19/23; mammo never performed; paternal GM with family history of breast cancer, diagnosed in her 70s; colonoscopy never performed; no family history of colon cancer.     F/U DM2: takes Metformin daily; last A1c 7.1% and increased Metformin to 1000 mg daily; no problems with higher dose; has been monitoring blood sugar when feels like sugar is running higher after has eaten and blood sugar will be 180s; will also get headache when has too many carbs; watches carbs/sugars in diet; not much exercise, busy with 1 year old; plans to start walking nightly when weather is warmer; occasional tingling in toes at night; no recent eye exam.    F/U heartburn: takes OTC Omeprazole daily as needed and works well; typically takes Omeprazole 4-5 times per week; will have some heartburn and reflux symptoms at times.    Also, c/o more headaches; has noted when drinks lemon water will get a migraine; has been avoiding lemon water and still getting headaches; will have change in vision if headache gets bad; tries to take Excedrin tension with onset and will typically help and prevent bad headache; no nausea/vomiting with headaches; some light sensitivity with headaches; drinks plenty of liquids; gets about 6-7 hours sleep per night; does a lot of work on the computer; symptoms started in September; has been getting headache about once per week.    Takes Valtrex as needed for genital herpes.      The following portions of the patient's history were reviewed and updated as appropriate: allergies, current medications, past family  history, past medical history, past social history, past surgical history and problem list.    Current Outpatient Medications on File Prior to Visit   Medication Sig    metFORMIN ER (GLUCOPHAGE-XR) 500 MG 24 hr tablet Take 2 tablets by mouth Daily With Breakfast.    norethindrone-ethinyl estradiol-ferrous fumarate (LOESTIN 24 FE) 1-20 MG-MCG(24) per tablet Take 1 tablet by mouth Daily.    omeprazole (priLOSEC) 20 MG capsule Take 1 capsule by mouth Daily.    valACYclovir (VALTREX) 500 MG tablet Take 1 tablet by mouth Every 12 (Twelve) Hours.     No current facility-administered medications on file prior to visit.        Past Medical History:   Diagnosis Date    Acne     Allergic rhinitis     Anxiety 2021    COVID-19 virus infection 2022    Diabetes mellitus     Diabetes mellitus during pregnancy treated with insulin 2022    Injury of back     Insulin controlled gestational diabetes mellitus (GDM) in third trimester 2022    Migraine headache     Positive depression screening 2021       Past Surgical History:   Procedure Laterality Date    WISDOM TOOTH EXTRACTION         Family History   Problem Relation Age of Onset    Heart disease Mother         has AICD due to ventricular arrhythmia    Arrhythmia Mother     Breast cancer Paternal Grandmother 70    Diabetes Maternal Grandmother     Heart attack Maternal Grandmother         in her 40s    Hypertension Maternal Grandfather        Social History     Socioeconomic History    Marital status: Single   Tobacco Use    Smoking status: Former     Packs/day: .5     Types: Cigarettes     Quit date: 2020     Years since quittin.0    Smokeless tobacco: Never    Tobacco comments:     previously smoked less than 1/2 pack per day for 3 years   Substance and Sexual Activity    Alcohol use: Yes     Alcohol/week: 3.0 standard drinks of alcohol     Types: 3 Glasses of wine per week     Comment: occasional    Drug use: No    Sexual activity: Yes      "Partners: Male     Birth control/protection: Birth control pill, Pill       Review of Systems   Constitutional:  Positive for fatigue (no unexplained fatigue). Negative for appetite change, chills, fever, unexpected weight gain (some attributes to changed jobs and not as active) and unexpected weight loss.   HENT:  Negative for ear pain, sinus pressure, sore throat and trouble swallowing.    Eyes:  Negative for blurred vision and discharge.   Respiratory:  Negative for cough, chest tightness (only with congestion during illness) and shortness of breath. Apnea: no consistent snoring.   Cardiovascular:  Negative for chest pain, palpitations and leg swelling.   Gastrointestinal:  Negative for abdominal pain, blood in stool, constipation and diarrhea.   Endocrine: Positive for polydipsia (no change). Negative for cold intolerance.   Genitourinary:  Negative for dysuria and frequency.   Musculoskeletal:  Negative for back pain. Arthralgias: some in left knee, worse going up and down stairs; has seen PT in past; will do stretches as needed and helps; no weakness or locking of knee; no known injury.  Skin:  Negative for rash and skin lesions.   Neurological:  Negative for dizziness, syncope and light-headedness. Headache: see HPI.  Hematological:  Bruises/bleeds easily: no change in easy bruising.   Psychiatric/Behavioral:  Negative for depressed mood. The patient is not nervous/anxious.        Objective   Vitals:    01/16/24 0807   BP: 122/78   BP Location: Left arm   Patient Position: Sitting   Cuff Size: Adult   Pulse: 72   SpO2: 95%   Weight: 87.1 kg (192 lb)   Height: 167.6 cm (66\")     Body mass index is 30.99 kg/m².    Physical Exam  Vitals and nursing note reviewed.   Constitutional:       General: She is not in acute distress.     Appearance: She is well-developed and well-groomed. She is not diaphoretic.   HENT:      Head: Normocephalic and atraumatic.      Jaw: No tenderness or pain on movement.      Right Ear: " Tympanic membrane and external ear normal. No decreased hearing noted.      Left Ear: External ear normal. No decreased hearing noted. Left ear middle ear effusion: mild. Tympanic membrane is not erythematous.      Nose: Nose normal.      Right Sinus: No maxillary sinus tenderness or frontal sinus tenderness.      Left Sinus: No maxillary sinus tenderness or frontal sinus tenderness.      Mouth/Throat:      Mouth: Mucous membranes are moist.      Pharynx: No oropharyngeal exudate or posterior oropharyngeal erythema.   Eyes:      Extraocular Movements: Extraocular movements intact.      Conjunctiva/sclera: Conjunctivae normal.      Pupils: Pupils are equal, round, and reactive to light.   Neck:      Thyroid: No thyromegaly.      Vascular: No carotid bruit.      Trachea: No tracheal deviation.   Cardiovascular:      Rate and Rhythm: Normal rate and regular rhythm.      Pulses: Normal pulses.           Dorsalis pedis pulses are 2+ on the right side and 2+ on the left side.        Posterior tibial pulses are 2+ on the right side and 2+ on the left side.      Heart sounds: Normal heart sounds. No murmur heard.  Pulmonary:      Effort: Pulmonary effort is normal. No respiratory distress.      Breath sounds: Normal breath sounds.   Abdominal:      General: Bowel sounds are normal.      Palpations: Abdomen is soft. There is no hepatomegaly or splenomegaly.      Tenderness: There is no abdominal tenderness. There is no guarding.   Musculoskeletal:         General: Normal range of motion.      Cervical back: Normal range of motion and neck supple. No bony tenderness.      Thoracic back: No bony tenderness.      Lumbar back: No bony tenderness.      Right lower leg: No edema.      Left lower leg: No edema.   Feet:      Right foot:      Protective Sensation: 10 sites tested.  10 sites sensed.      Skin integrity: Skin integrity normal.      Left foot:      Protective Sensation: 10 sites tested.  10 sites sensed.      Skin  integrity: Skin integrity normal.      Comments: Diabetic Foot Exam Performed and Monofilament Test Performed    Lymphadenopathy:      Cervical: No cervical adenopathy.   Skin:     General: Skin is warm and dry.      Findings: No rash.   Neurological:      Mental Status: She is alert and oriented to person, place, and time.      Cranial Nerves: No cranial nerve deficit.      Motor: Motor function is intact.      Coordination: Coordination normal.      Gait: Gait normal.      Deep Tendon Reflexes: Reflexes are normal and symmetric.   Psychiatric:         Mood and Affect: Mood normal.         Behavior: Behavior normal.         Thought Content: Thought content normal.         Cognition and Memory: Cognition normal.         Judgment: Judgment normal.         Lab Results   Component Value Date    WBC 7.32 12/06/2022    RBC 4.18 12/06/2022    HGB 12.7 12/06/2022    HCT 37.1 12/06/2022    MCV 88.8 12/06/2022    MCH 30.4 12/06/2022    MCHC 34.2 12/06/2022    RDW 13.0 12/06/2022    RDWSD 41.6 09/15/2022    MPV 9.5 09/15/2022     12/06/2022    NEUTRORELPCT 60.0 12/06/2022    LYMPHORELPCT 32.1 12/06/2022    MONORELPCT 6.1 12/06/2022    EOSRELPCT 1.1 12/06/2022    BASORELPCT 0.4 12/06/2022    AUTOIGPER 0.8 (H) 09/15/2022    NEUTROABS 4.39 12/06/2022    LYMPHSABS 2.35 12/06/2022    MONOSABS 0.45 12/06/2022    EOSABS 0.08 12/06/2022    BASOSABS 0.03 12/06/2022    AUTOIGNUM 0.13 (H) 09/15/2022    NRBC 0.0 12/06/2022     Lab Results   Component Value Date    GLUCOSE 158 (H) 07/14/2023    BUN 11 07/14/2023    CREATININE 0.78 07/14/2023    EGFRIFNONA 101 11/10/2021    EGFRIFAFRI 117 11/10/2021    BCR 14.1 07/14/2023    K 4.5 07/14/2023    CO2 24.5 07/14/2023    CALCIUM 9.6 07/14/2023    PROTENTOTREF 6.8 07/14/2023    ALBUMIN 4.4 07/14/2023    LABIL2 1.8 07/14/2023    AST 12 07/14/2023    ALT 17 07/14/2023      Lab Results   Component Value Date    CHLPL 194 07/14/2023    TRIG 92 07/14/2023    HDL 59 07/14/2023    VLDL 17  07/14/2023     (H) 07/14/2023     Lab Results   Component Value Date    HGBA1C 7.10 (H) 07/14/2023       Assessment    Problem List Items Addressed This Visit       Heartburn    Current Assessment & Plan     Continue Omeprazole daily as needed.         Relevant Orders    CBC & Differential    Nonintractable headache    Current Assessment & Plan     Continue increased intake of clear liquids.  Keep headache diary.  Schedule eye exam.  Continue Excedrin as needed.         Relevant Orders    Vitamin B12 & Folate    Comprehensive Metabolic Panel    CBC & Differential    Type 2 diabetes mellitus without complication, without long-term current use of insulin    Current Assessment & Plan     Diabetes is  pending lab results .   Continue current treatment regimen.  Regular aerobic exercise.  Reminded to get yearly retinal exam.  Diabetes will be reassessed  4 months .  Continue Metformin daily.         Relevant Orders    Lipid Panel With LDL / HDL Ratio    Comprehensive Metabolic Panel    Hemoglobin A1c    Microalbumin / Creatinine Urine Ratio - Urine, Clean Catch    Class 1 obesity with serious comorbidity and body mass index (BMI) of 31.0 to 31.9 in adult    Current Assessment & Plan     Patient's (Body mass index is 30.99 kg/m².) indicates that they are obese (BMI >30) with health conditions that include diabetes mellitus and GERD . Weight is unchanged. BMI  is above average; BMI management plan is completed. We discussed low calorie, low carb based diet program, portion control, and increasing exercise.           Other Visit Diagnoses       Encounter for annual physical exam    -  Primary    Relevant Orders    Vitamin B12 & Folate    Lipid Panel With LDL / HDL Ratio    Comprehensive Metabolic Panel    Hemoglobin A1c    CBC & Differential    Microalbumin / Creatinine Urine Ratio - Urine, Clean Catch    Encounter for lipid screening for cardiovascular disease        Relevant Orders    Lipid Panel With LDL / HDL  Ratio             Return in about 4 months (around 5/16/2024) for Recheck.or sooner if symptoms persist or worsen.  Impression: Health maintenance visit.  Currently, eats a pretty healthy diet and has an inadequate exercise routine.  Cervical cancer screening: UTD per GYN.  Breast cancer screening: not indicated.  Colorectal cancer screening: not indicated.  Screening lab work includes: CMP, lipid.  Immunizations: declines flu, COVID-19 and PCV20 at this time; risks and benefits of immunizations were discussed with patient.  Patient was advised to be evaluated by ophthalmology.  Advice and education were given regarding nutrition and aerobic exercise.  Will send refill of Metformin to [facility] for 90 day supply pending lab results.

## 2024-01-16 NOTE — ASSESSMENT & PLAN NOTE
Continue increased intake of clear liquids.  Keep headache diary.  Schedule eye exam.  Continue Excedrin as needed.

## 2024-01-16 NOTE — PATIENT INSTRUCTIONS
Continue increased intake of clear liquids.  Keep headache diary.  Schedule eye exam.  Continue Excedrin as needed.  Continue to monitor your blood sugar once daily before a meal and record results.  Continue to work on healthy diet and exercise.  Follow up pending lab results.  Follow up in 4 months, or sooner if problems or concerns.

## 2024-01-17 DIAGNOSIS — E53.8 VITAMIN B12 DEFICIENCY: Primary | ICD-10-CM

## 2024-01-17 DIAGNOSIS — E11.9 TYPE 2 DIABETES MELLITUS WITHOUT COMPLICATION, WITHOUT LONG-TERM CURRENT USE OF INSULIN: ICD-10-CM

## 2024-01-17 LAB
ALBUMIN SERPL-MCNC: 4.5 G/DL (ref 3.5–5.2)
ALBUMIN/CREAT UR: 26 MG/G CREAT (ref 0–29)
ALBUMIN/GLOB SERPL: 1.7 G/DL
ALP SERPL-CCNC: 52 U/L (ref 39–117)
ALT SERPL-CCNC: 15 U/L (ref 1–33)
AST SERPL-CCNC: 15 U/L (ref 1–32)
BASOPHILS # BLD AUTO: 0.04 10*3/MM3 (ref 0–0.2)
BASOPHILS NFR BLD AUTO: 0.5 % (ref 0–1.5)
BILIRUB SERPL-MCNC: 0.5 MG/DL (ref 0–1.2)
BUN SERPL-MCNC: 9 MG/DL (ref 6–20)
BUN/CREAT SERPL: 12.3 (ref 7–25)
CALCIUM SERPL-MCNC: 9.7 MG/DL (ref 8.6–10.5)
CHLORIDE SERPL-SCNC: 98 MMOL/L (ref 98–107)
CHOLEST SERPL-MCNC: 210 MG/DL (ref 0–200)
CO2 SERPL-SCNC: 25.7 MMOL/L (ref 22–29)
CREAT SERPL-MCNC: 0.73 MG/DL (ref 0.57–1)
CREAT UR-MCNC: 43.4 MG/DL
EGFRCR SERPLBLD CKD-EPI 2021: 109.5 ML/MIN/1.73
EOSINOPHIL # BLD AUTO: 0.06 10*3/MM3 (ref 0–0.4)
EOSINOPHIL NFR BLD AUTO: 0.7 % (ref 0.3–6.2)
ERYTHROCYTE [DISTWIDTH] IN BLOOD BY AUTOMATED COUNT: 11.9 % (ref 12.3–15.4)
FOLATE SERPL-MCNC: 15.8 NG/ML (ref 4.78–24.2)
GLOBULIN SER CALC-MCNC: 2.7 GM/DL
GLUCOSE SERPL-MCNC: 150 MG/DL (ref 65–99)
HBA1C MFR BLD: 7.1 % (ref 4.8–5.6)
HCT VFR BLD AUTO: 37.7 % (ref 34–46.6)
HDLC SERPL-MCNC: 57 MG/DL (ref 40–60)
HGB BLD-MCNC: 12.8 G/DL (ref 12–15.9)
IMM GRANULOCYTES # BLD AUTO: 0.03 10*3/MM3 (ref 0–0.05)
IMM GRANULOCYTES NFR BLD AUTO: 0.3 % (ref 0–0.5)
LDLC SERPL CALC-MCNC: 130 MG/DL (ref 0–100)
LDLC/HDLC SERPL: 2.23 {RATIO}
LYMPHOCYTES # BLD AUTO: 2.51 10*3/MM3 (ref 0.7–3.1)
LYMPHOCYTES NFR BLD AUTO: 28.3 % (ref 19.6–45.3)
MCH RBC QN AUTO: 31.6 PG (ref 26.6–33)
MCHC RBC AUTO-ENTMCNC: 34 G/DL (ref 31.5–35.7)
MCV RBC AUTO: 93.1 FL (ref 79–97)
MICROALBUMIN UR-MCNC: 11.5 UG/ML
MONOCYTES # BLD AUTO: 0.44 10*3/MM3 (ref 0.1–0.9)
MONOCYTES NFR BLD AUTO: 5 % (ref 5–12)
NEUTROPHILS # BLD AUTO: 5.8 10*3/MM3 (ref 1.7–7)
NEUTROPHILS NFR BLD AUTO: 65.2 % (ref 42.7–76)
NRBC BLD AUTO-RTO: 0 /100 WBC (ref 0–0.2)
PLATELET # BLD AUTO: 354 10*3/MM3 (ref 140–450)
POTASSIUM SERPL-SCNC: 4.2 MMOL/L (ref 3.5–5.2)
PROT SERPL-MCNC: 7.2 G/DL (ref 6–8.5)
RBC # BLD AUTO: 4.05 10*6/MM3 (ref 3.77–5.28)
SODIUM SERPL-SCNC: 137 MMOL/L (ref 136–145)
TRIGL SERPL-MCNC: 130 MG/DL (ref 0–150)
VIT B12 SERPL-MCNC: 317 PG/ML (ref 211–946)
VLDLC SERPL CALC-MCNC: 23 MG/DL (ref 5–40)
WBC # BLD AUTO: 8.88 10*3/MM3 (ref 3.4–10.8)

## 2024-01-17 RX ORDER — METFORMIN HYDROCHLORIDE 500 MG/1
1000 TABLET, EXTENDED RELEASE ORAL 2 TIMES DAILY WITH MEALS
Qty: 360 TABLET | Refills: 1 | Status: SHIPPED | OUTPATIENT
Start: 2024-01-17

## 2024-01-17 RX ORDER — LANOLIN ALCOHOL/MO/W.PET/CERES
1000 CREAM (GRAM) TOPICAL DAILY
Start: 2024-01-17

## 2024-05-31 ENCOUNTER — OFFICE VISIT (OUTPATIENT)
Dept: FAMILY MEDICINE CLINIC | Facility: CLINIC | Age: 39
End: 2024-05-31
Payer: COMMERCIAL

## 2024-05-31 VITALS
TEMPERATURE: 97.5 F | WEIGHT: 190.5 LBS | SYSTOLIC BLOOD PRESSURE: 120 MMHG | BODY MASS INDEX: 30.62 KG/M2 | OXYGEN SATURATION: 100 % | HEART RATE: 94 BPM | HEIGHT: 66 IN | DIASTOLIC BLOOD PRESSURE: 70 MMHG

## 2024-05-31 DIAGNOSIS — E11.9 TYPE 2 DIABETES MELLITUS WITHOUT COMPLICATION, WITHOUT LONG-TERM CURRENT USE OF INSULIN: Primary | ICD-10-CM

## 2024-05-31 DIAGNOSIS — R51.9 NONINTRACTABLE EPISODIC HEADACHE, UNSPECIFIED HEADACHE TYPE: ICD-10-CM

## 2024-05-31 DIAGNOSIS — R12 HEARTBURN: ICD-10-CM

## 2024-05-31 DIAGNOSIS — E53.8 VITAMIN B12 DEFICIENCY: ICD-10-CM

## 2024-05-31 DIAGNOSIS — J30.9 ALLERGIC RHINITIS, UNSPECIFIED SEASONALITY, UNSPECIFIED TRIGGER: ICD-10-CM

## 2024-05-31 PROCEDURE — 99214 OFFICE O/P EST MOD 30 MIN: CPT | Performed by: NURSE PRACTITIONER

## 2024-05-31 NOTE — ASSESSMENT & PLAN NOTE
Continue increased intake of clear liquids.  Try alternative antihistamine, such as Allegra or Zyrtec.  Consider nasal steroid, such as Flonase or Nasacort.  Schedule eye exam.

## 2024-05-31 NOTE — PATIENT INSTRUCTIONS
Continue increased intake of clear liquids and rest.  Try alternative antihistamine, such as Allegra or Zyrtec.  Consider nasal steroid, such as Flonase or Nasacort.  Schedule eye exam.  Continue to work on healthy diet and exercise.  Follow up pending lab results.  Follow up in 6 months, or sooner if symptoms persist or worsen.

## 2024-05-31 NOTE — ASSESSMENT & PLAN NOTE
Diabetes is stable per home blood sugar readings.   Continue current treatment regimen.  Regular aerobic exercise.  Reminded to get yearly retinal exam.  Diabetes will be reassessed in 6 months  Continue Metformin twice daily.

## 2024-05-31 NOTE — ASSESSMENT & PLAN NOTE
Try alternative antihistamine, such as Allegra or Zyrtec.  Consider nasal steroid, such as Flonase or Nasacort.

## 2024-05-31 NOTE — PROGRESS NOTES
Subjective   Neli Bravo is a 38 y.o. female.     Chief Complaint   Patient presents with    Headache    Diabetes     Follow up        History of Present Illness   Patient presents for follow up DM2: takes Metformin twice daily; last A1c 7.1% and increased Metformin to 1000 mg twice daily; no problems with medication after first week; monitors blood sugar, typically runs 120s; watches carbs/sugars in diet; not much exercise; occasional tingling in 2nd and 3rd right toes; occasionally symptoms will wake her up during the night; had some trouble with back last week after bent over to pick something up and back locked up; had trouble walking for few days, but has improved with heat and rest, back still feels tight; staying active has also helped; tried muscle relaxer couple of times at night to help sleep; discomfort mostly in right lower back; symptoms radiate to hips, both hips sore; has had trouble with sciatica in past, but not this time; no weakness; no bladder or bowel dysfunction; needs to schedule eye exam.     F/U heartburn: takes OTC Omeprazole daily and works well; will have symptoms if misses a dose.    F/U headaches: had headache daily last week; Advil would help some short term; no longer drinking lemon water since was trigger; no other identified triggers; has had trouble with allergies; has had nasal congestion worse with weather changes; takes Claritin daily; no ear pain or sore throat; will have some blurry vision with bad headaches; no no nausea, vomiting or light sensitivity; has not taken Excedrin recently, has just been taking Advil as needed; prior to last week had not needed to take Advil for long time.    F/U Vitamin B12 deficiency: started Vitamin B12 daily; may have helped headaches since have not been as intense.     Takes Valtrex as needed for genital herpes.       The following portions of the patient's history were reviewed and updated as appropriate: allergies, current medications, past  family history, past medical history, past social history, past surgical history and problem list.    Current Outpatient Medications on File Prior to Visit   Medication Sig    metFORMIN ER (GLUCOPHAGE-XR) 500 MG 24 hr tablet Take 2 tablets by mouth 2 (Two) Times a Day With Meals.    norethindrone-ethinyl estradiol-ferrous fumarate (LOESTIN 24 FE) 1-20 MG-MCG(24) per tablet Take 1 tablet by mouth Daily.    omeprazole (priLOSEC) 20 MG capsule Take 1 capsule by mouth Daily.    valACYclovir (VALTREX) 500 MG tablet Take 1 tablet by mouth Every 12 (Twelve) Hours.    vitamin B-12 (CYANOCOBALAMIN) 1000 MCG tablet Take 1 tablet by mouth Daily.     No current facility-administered medications on file prior to visit.        Past Medical History:   Diagnosis Date    Acne     Allergic rhinitis     Anxiety 2021    COVID-19 virus infection 2022    Diabetes mellitus     Diabetes mellitus during pregnancy treated with insulin 2022    Injury of back     Insulin controlled gestational diabetes mellitus (GDM) in third trimester 2022    Migraine headache     Positive depression screening 2021       Past Surgical History:   Procedure Laterality Date    WISDOM TOOTH EXTRACTION         Family History   Problem Relation Age of Onset    Heart disease Mother         has AICD due to ventricular arrhythmia    Arrhythmia Mother     Breast cancer Paternal Grandmother 70    Diabetes Maternal Grandmother     Heart attack Maternal Grandmother         in her 40s    Hypertension Maternal Grandfather        Social History     Socioeconomic History    Marital status: Single   Tobacco Use    Smoking status: Former     Current packs/day: 0.00     Types: Cigarettes     Quit date: 2020     Years since quittin.3    Smokeless tobacco: Never    Tobacco comments:     previously smoked less than 1/2 pack per day for 3 years   Substance and Sexual Activity    Alcohol use: Yes     Alcohol/week: 3.0 standard drinks of alcohol  "    Types: 3 Glasses of wine per week     Comment: occasional    Drug use: No    Sexual activity: Yes     Partners: Male     Birth control/protection: Birth control pill, Pill       Review of Systems   Constitutional:  Positive for fatigue. Negative for appetite change, chills, fever, unexpected weight gain (seems to have trouble losing weight) and unexpected weight loss.   HENT:  Negative for trouble swallowing. Rhinorrhea: has had stuffy nose. Sinus pressure: some at times.   Respiratory:  Negative for cough, chest tightness and shortness of breath.    Cardiovascular:  Negative for chest pain, palpitations and leg swelling.   Gastrointestinal:  Negative for abdominal pain, blood in stool, constipation and diarrhea.   Endocrine: Negative for cold intolerance and polydipsia.   Genitourinary:  Negative for dysuria and frequency (some, but drinks a lot of water).   Musculoskeletal:  Back pain: see HPI.   Skin:  Negative for rash.   Neurological:  Negative for dizziness, syncope and light-headedness. Headache: see HPI.  Hematological:  Bruises/bleeds easily: no change in easy bruising.       Objective   Vitals:    05/31/24 0755   BP: 120/70   BP Location: Left arm   Patient Position: Sitting   Cuff Size: Adult   Pulse: 94   Temp: 97.5 °F (36.4 °C)   SpO2: 100%   Weight: 86.4 kg (190 lb 8 oz)   Height: 167.6 cm (66\")     Body mass index is 30.75 kg/m².    Physical Exam  Vitals and nursing note reviewed.   Constitutional:       General: She is not in acute distress.     Appearance: She is well-developed and well-groomed. She is not diaphoretic.   HENT:      Head: Normocephalic.      Jaw: No tenderness or pain on movement.      Right Ear: Tympanic membrane and external ear normal. No decreased hearing noted.      Left Ear: External ear normal. No decreased hearing noted. Left ear middle ear effusion: mild. Tympanic membrane is not erythematous.      Nose: Nose normal.      Right Sinus: No maxillary sinus tenderness or " frontal sinus tenderness.      Left Sinus: No maxillary sinus tenderness or frontal sinus tenderness.      Mouth/Throat:      Mouth: Mucous membranes are moist.      Pharynx: No oropharyngeal exudate or posterior oropharyngeal erythema.   Eyes:      Conjunctiva/sclera: Conjunctivae normal.   Neck:      Vascular: No carotid bruit.   Cardiovascular:      Rate and Rhythm: Normal rate and regular rhythm.      Pulses: Normal pulses.      Heart sounds: Normal heart sounds. No murmur heard.  Pulmonary:      Effort: Pulmonary effort is normal. No respiratory distress.      Breath sounds: Normal breath sounds.   Abdominal:      General: Bowel sounds are normal.      Palpations: Abdomen is soft. There is no hepatomegaly or splenomegaly.      Tenderness: There is no abdominal tenderness. There is no guarding.   Musculoskeletal:      Cervical back: Normal range of motion and neck supple.      Right lower leg: No edema.      Left lower leg: No edema.   Lymphadenopathy:      Cervical: No cervical adenopathy.   Skin:     General: Skin is warm and dry.      Findings: No rash.   Neurological:      Mental Status: She is alert and oriented to person, place, and time.      Gait: Gait normal.   Psychiatric:         Mood and Affect: Mood normal.         Behavior: Behavior normal.         Thought Content: Thought content normal.         Cognition and Memory: Cognition normal.         Judgment: Judgment normal.         Lab Results   Component Value Date    WBC 8.88 01/16/2024    RBC 4.05 01/16/2024    HGB 12.8 01/16/2024    HCT 37.7 01/16/2024    MCV 93.1 01/16/2024    MCH 31.6 01/16/2024    MCHC 34.0 01/16/2024    RDW 11.9 (L) 01/16/2024    RDWSD 41.6 09/15/2022    MPV 9.5 09/15/2022     01/16/2024    NEUTRORELPCT 65.2 01/16/2024    LYMPHORELPCT 28.3 01/16/2024    MONORELPCT 5.0 01/16/2024    EOSRELPCT 0.7 01/16/2024    BASORELPCT 0.5 01/16/2024    AUTOIGPER 0.8 (H) 09/15/2022    NEUTROABS 5.80 01/16/2024    LYMPHSABS 2.51  01/16/2024    MONOSABS 0.44 01/16/2024    EOSABS 0.06 01/16/2024    BASOSABS 0.04 01/16/2024    AUTOIGNUM 0.13 (H) 09/15/2022    NRBC 0.0 01/16/2024     Lab Results   Component Value Date    GLUCOSE 150 (H) 01/16/2024    BUN 9 01/16/2024    CREATININE 0.73 01/16/2024    EGFRIFNONA 101 11/10/2021    EGFRIFAFRI 117 11/10/2021    BCR 12.3 01/16/2024    K 4.2 01/16/2024    CO2 25.7 01/16/2024    CALCIUM 9.7 01/16/2024    PROTENTOTREF 7.2 01/16/2024    ALBUMIN 4.5 01/16/2024    LABIL2 1.7 01/16/2024    AST 15 01/16/2024    ALT 15 01/16/2024      Lab Results   Component Value Date    CHLPL 210 (H) 01/16/2024    TRIG 130 01/16/2024    HDL 57 01/16/2024    VLDL 23 01/16/2024     (H) 01/16/2024     Lab Results   Component Value Date    HGBA1C 7.10 (H) 01/16/2024         Assessment    Problem List Items Addressed This Visit       Heartburn    Current Assessment & Plan     Stable.  Continue Omeprazole daily.         Relevant Orders    CBC & Differential    Nonintractable headache    Current Assessment & Plan     Continue increased intake of clear liquids.  Try alternative antihistamine, such as Allegra or Zyrtec.  Consider nasal steroid, such as Flonase or Nasacort.  Schedule eye exam.         Allergic rhinitis    Current Assessment & Plan     Try alternative antihistamine, such as Allegra or Zyrtec.  Consider nasal steroid, such as Flonase or Nasacort.         Type 2 diabetes mellitus without complication, without long-term current use of insulin - Primary    Current Assessment & Plan     Diabetes is stable per home blood sugar readings.   Continue current treatment regimen.  Regular aerobic exercise.  Reminded to get yearly retinal exam.  Diabetes will be reassessed in 6 months  Continue Metformin twice daily.         Relevant Orders    Hemoglobin A1c    Comprehensive Metabolic Panel    Lipid Panel With LDL / HDL Ratio    Vitamin B12 deficiency    Relevant Orders    Vitamin B12 & Folate    CBC & Differential         Return in about 6 months (around 11/30/2024) for Recheck. or sooner if symptoms persist or worsen.  Discussed headaches may have been worse last week due to changes in weather; will try to get better control of allergies and see if helps headaches.

## 2024-06-01 LAB
ALBUMIN SERPL-MCNC: 4.5 G/DL (ref 3.5–5.2)
ALBUMIN/GLOB SERPL: 1.9 G/DL
ALP SERPL-CCNC: 49 U/L (ref 39–117)
ALT SERPL-CCNC: 17 U/L (ref 1–33)
AST SERPL-CCNC: 15 U/L (ref 1–32)
BASOPHILS # BLD AUTO: 0.04 10*3/MM3 (ref 0–0.2)
BASOPHILS NFR BLD AUTO: 0.5 % (ref 0–1.5)
BILIRUB SERPL-MCNC: 0.4 MG/DL (ref 0–1.2)
BUN SERPL-MCNC: 8 MG/DL (ref 6–20)
BUN/CREAT SERPL: 11.6 (ref 7–25)
CALCIUM SERPL-MCNC: 9.5 MG/DL (ref 8.6–10.5)
CHLORIDE SERPL-SCNC: 100 MMOL/L (ref 98–107)
CHOLEST SERPL-MCNC: 188 MG/DL (ref 0–200)
CO2 SERPL-SCNC: 26.1 MMOL/L (ref 22–29)
CREAT SERPL-MCNC: 0.69 MG/DL (ref 0.57–1)
EGFRCR SERPLBLD CKD-EPI 2021: 115.5 ML/MIN/1.73
EOSINOPHIL # BLD AUTO: 0.17 10*3/MM3 (ref 0–0.4)
EOSINOPHIL NFR BLD AUTO: 2 % (ref 0.3–6.2)
ERYTHROCYTE [DISTWIDTH] IN BLOOD BY AUTOMATED COUNT: 12 % (ref 12.3–15.4)
FOLATE SERPL-MCNC: 12 NG/ML (ref 4.78–24.2)
GLOBULIN SER CALC-MCNC: 2.4 GM/DL
GLUCOSE SERPL-MCNC: 118 MG/DL (ref 65–99)
HBA1C MFR BLD: 6.4 % (ref 4.8–5.6)
HCT VFR BLD AUTO: 36.4 % (ref 34–46.6)
HDLC SERPL-MCNC: 59 MG/DL (ref 40–60)
HGB BLD-MCNC: 12.3 G/DL (ref 12–15.9)
IMM GRANULOCYTES # BLD AUTO: 0.03 10*3/MM3 (ref 0–0.05)
IMM GRANULOCYTES NFR BLD AUTO: 0.4 % (ref 0–0.5)
LDLC SERPL CALC-MCNC: 110 MG/DL (ref 0–100)
LDLC/HDLC SERPL: 1.83 {RATIO}
LYMPHOCYTES # BLD AUTO: 2.26 10*3/MM3 (ref 0.7–3.1)
LYMPHOCYTES NFR BLD AUTO: 27.2 % (ref 19.6–45.3)
MCH RBC QN AUTO: 31.5 PG (ref 26.6–33)
MCHC RBC AUTO-ENTMCNC: 33.8 G/DL (ref 31.5–35.7)
MCV RBC AUTO: 93.1 FL (ref 79–97)
MONOCYTES # BLD AUTO: 0.39 10*3/MM3 (ref 0.1–0.9)
MONOCYTES NFR BLD AUTO: 4.7 % (ref 5–12)
NEUTROPHILS # BLD AUTO: 5.41 10*3/MM3 (ref 1.7–7)
NEUTROPHILS NFR BLD AUTO: 65.2 % (ref 42.7–76)
NRBC BLD AUTO-RTO: 0 /100 WBC (ref 0–0.2)
PLATELET # BLD AUTO: 324 10*3/MM3 (ref 140–450)
POTASSIUM SERPL-SCNC: 4.8 MMOL/L (ref 3.5–5.2)
PROT SERPL-MCNC: 6.9 G/DL (ref 6–8.5)
RBC # BLD AUTO: 3.91 10*6/MM3 (ref 3.77–5.28)
SODIUM SERPL-SCNC: 137 MMOL/L (ref 136–145)
TRIGL SERPL-MCNC: 105 MG/DL (ref 0–150)
VIT B12 SERPL-MCNC: 939 PG/ML (ref 211–946)
VLDLC SERPL CALC-MCNC: 19 MG/DL (ref 5–40)
WBC # BLD AUTO: 8.3 10*3/MM3 (ref 3.4–10.8)

## 2024-06-03 DIAGNOSIS — E11.9 TYPE 2 DIABETES MELLITUS WITHOUT COMPLICATION, WITHOUT LONG-TERM CURRENT USE OF INSULIN: ICD-10-CM

## 2024-06-03 RX ORDER — METFORMIN HYDROCHLORIDE 500 MG/1
1000 TABLET, EXTENDED RELEASE ORAL 2 TIMES DAILY WITH MEALS
Qty: 360 TABLET | Refills: 1 | Status: SHIPPED | OUTPATIENT
Start: 2024-06-03

## 2024-09-16 RX ORDER — VALACYCLOVIR HYDROCHLORIDE 500 MG/1
500 TABLET, FILM COATED ORAL EVERY 12 HOURS SCHEDULED
Qty: 18 TABLET | Refills: 0 | Status: SHIPPED | OUTPATIENT
Start: 2024-09-16

## 2024-11-05 RX ORDER — NORETHINDRONE ACETATE AND ETHINYL ESTRADIOL, ETHINYL ESTRADIOL AND FERROUS FUMARATE 1MG-10(24)
1 KIT ORAL DAILY
Qty: 28 TABLET | Refills: 11 | OUTPATIENT
Start: 2024-11-05

## 2024-11-20 RX ORDER — VALACYCLOVIR HYDROCHLORIDE 500 MG/1
500 TABLET, FILM COATED ORAL 2 TIMES DAILY
Qty: 18 TABLET | OUTPATIENT
Start: 2024-11-20

## 2024-11-27 ENCOUNTER — OFFICE VISIT (OUTPATIENT)
Dept: FAMILY MEDICINE CLINIC | Facility: CLINIC | Age: 39
End: 2024-11-27
Payer: COMMERCIAL

## 2024-11-27 VITALS
TEMPERATURE: 97.5 F | DIASTOLIC BLOOD PRESSURE: 84 MMHG | BODY MASS INDEX: 29.83 KG/M2 | SYSTOLIC BLOOD PRESSURE: 130 MMHG | HEIGHT: 66 IN | OXYGEN SATURATION: 98 % | WEIGHT: 185.6 LBS | HEART RATE: 86 BPM

## 2024-11-27 DIAGNOSIS — R12 HEARTBURN: ICD-10-CM

## 2024-11-27 DIAGNOSIS — E53.8 VITAMIN B12 DEFICIENCY: ICD-10-CM

## 2024-11-27 DIAGNOSIS — R51.9 NONINTRACTABLE EPISODIC HEADACHE, UNSPECIFIED HEADACHE TYPE: ICD-10-CM

## 2024-11-27 DIAGNOSIS — E11.9 TYPE 2 DIABETES MELLITUS WITHOUT COMPLICATION, WITHOUT LONG-TERM CURRENT USE OF INSULIN: Primary | ICD-10-CM

## 2024-11-27 DIAGNOSIS — R68.81 EARLY SATIETY: ICD-10-CM

## 2024-11-27 PROCEDURE — 99214 OFFICE O/P EST MOD 30 MIN: CPT | Performed by: NURSE PRACTITIONER

## 2024-11-27 RX ORDER — OMEPRAZOLE 40 MG/1
40 CAPSULE, DELAYED RELEASE ORAL DAILY
Qty: 30 CAPSULE | Refills: 2 | Status: SHIPPED | OUTPATIENT
Start: 2024-11-27

## 2024-11-27 RX ORDER — METFORMIN HYDROCHLORIDE 500 MG/1
1000 TABLET, EXTENDED RELEASE ORAL 2 TIMES DAILY WITH MEALS
Qty: 360 TABLET | Refills: 1 | Status: SHIPPED | OUTPATIENT
Start: 2024-11-27

## 2024-11-27 RX ORDER — LORATADINE 10 MG/1
10 TABLET ORAL DAILY
COMMUNITY

## 2024-11-27 RX ORDER — VALACYCLOVIR HYDROCHLORIDE 500 MG/1
500 TABLET, FILM COATED ORAL EVERY 12 HOURS SCHEDULED
Qty: 18 TABLET | Refills: 1 | Status: SHIPPED | OUTPATIENT
Start: 2024-11-27

## 2024-11-27 NOTE — PROGRESS NOTES
Subjective   Neli Bravo is a 39 y.o. female.     Chief Complaint   Patient presents with    Diabetes    Follow-up     Answers submitted by the patient for this visit:  Primary Reason for Visit (Submitted on 11/20/2024)  What is the primary reason for your visit?: Diabetes  Diabetes Questionnaire (Submitted on 11/20/2024)  Chief Complaint: Diabetes problem  Below 70: never    History of Present Illness   Patient presents for follow up DM2: takes Metformin twice daily; last A1c 6.4%; monitors blood sugar, typically runs 150-170s in mornings and in afternoons; occasionally lower if has gone long period of time without eating; has not been hungry and has only been eating one large meal daily; needs to decrease carbs; walks some, not much exercise; occasional tingling in 2nd and 3rd right toes, but has improved; last eye exam at [facility] few months ago.     F/U heartburn: takes OTC Omeprazole daily and helps; will have symptoms if misses a dose; has noted some symptoms if eats late at night when eats certain foods; tries to avoid eating after 7:00.     F/U headaches: some dull headaches at times recently, but no migraines for long time; may be related to changes in weather; has been taking daily allergy med and helps some; has been taking Ibuprofen as needed and helps; no change in vision or nausea/vomiting with headaches.     F/U Vitamin B12 deficiency: takes Vitamin B12 daily.     Takes Valtrex as needed for genital herpes.       The following portions of the patient's history were reviewed and updated as appropriate: allergies, current medications, past family history, past medical history, past social history, past surgical history and problem list.    Current Outpatient Medications on File Prior to Visit   Medication Sig    loratadine (Claritin) 10 MG tablet Take 1 tablet by mouth Daily.    norethindrone-ethinyl estradiol-ferrous fumarate (LOESTIN 24 FE) 1-20 MG-MCG(24) per tablet Take 1 tablet by  mouth Daily.    vitamin B-12 (CYANOCOBALAMIN) 1000 MCG tablet Take 1 tablet by mouth Daily.    [DISCONTINUED] metFORMIN ER (GLUCOPHAGE-XR) 500 MG 24 hr tablet Take 2 tablets by mouth 2 (Two) Times a Day With Meals.    [DISCONTINUED] omeprazole (priLOSEC) 20 MG capsule Take 1 capsule by mouth Daily.    [DISCONTINUED] valACYclovir (VALTREX) 500 MG tablet Take 1 tablet by mouth Every 12 (Twelve) Hours. For 3 days as needed for flares.     No current facility-administered medications on file prior to visit.        Past Medical History:   Diagnosis Date    Acne     Allergic rhinitis     Anxiety 2021    COVID-19 virus infection 2022    Diabetes mellitus     Diabetes mellitus during pregnancy treated with insulin 2022    Injury of back     Insulin controlled gestational diabetes mellitus (GDM) in third trimester 2022    Migraine headache     Positive depression screening 2021       Past Surgical History:   Procedure Laterality Date    WISDOM TOOTH EXTRACTION         Family History   Problem Relation Age of Onset    Heart disease Mother         has AICD due to ventricular arrhythmia    Arrhythmia Mother     Breast cancer Paternal Grandmother 70    Diabetes Maternal Grandmother     Heart attack Maternal Grandmother         in her 40s    Hypertension Maternal Grandfather        Social History     Socioeconomic History    Marital status: Single   Tobacco Use    Smoking status: Former     Current packs/day: 0.00     Types: Cigarettes     Quit date: 2020     Years since quittin.8    Smokeless tobacco: Never    Tobacco comments:     previously smoked less than 1/2 pack per day for 3 years   Vaping Use    Vaping status: Never Used   Substance and Sexual Activity    Alcohol use: Yes     Alcohol/week: 3.0 standard drinks of alcohol     Types: 3 Glasses of wine per week     Comment: occasional    Drug use: No    Sexual activity: Yes     Partners: Male     Birth control/protection: Birth control  "pill, Pill       Review of Systems   Constitutional:  Negative for chills, fever, unexpected weight gain and unexpected weight loss. Appetite change: has had decreased appetite; \"i am bored with food\"; has had some early satiety. Fatigue: some.  HENT:  Negative for ear pain (always has sensation of fluid in left ear), sore throat and trouble swallowing.    Eyes:  Negative for blurred vision.   Respiratory:  Negative for cough, chest tightness and shortness of breath.    Cardiovascular:  Negative for chest pain, palpitations and leg swelling.   Gastrointestinal:  Negative for abdominal pain, blood in stool, constipation, diarrhea and GERD (see HPI).   Endocrine: Positive for polydipsia.   Genitourinary:  Positive for frequency (no change). Negative for dysuria.   Musculoskeletal:  Negative for back pain.   Skin:  Negative for rash.   Neurological:  Negative for dizziness, tremors, speech difficulty, light-headedness and confusion. Headache: see HPI.      Objective   Vitals:    11/27/24 0801   BP: 130/84   BP Location: Left arm   Patient Position: Sitting   Cuff Size: Adult   Pulse: 86   Temp: 97.5 °F (36.4 °C)   TempSrc: Temporal   SpO2: 98%   Weight: 84.2 kg (185 lb 9.6 oz)   Height: 167.6 cm (65.98\")     Body mass index is 29.97 kg/m².    Physical Exam  Vitals and nursing note reviewed.   Constitutional:       General: She is not in acute distress.     Appearance: She is well-developed and well-groomed. She is not diaphoretic.   HENT:      Head: Normocephalic.      Right Ear: External ear normal.      Left Ear: External ear normal.   Eyes:      Conjunctiva/sclera: Conjunctivae normal.   Neck:      Vascular: No carotid bruit.   Cardiovascular:      Rate and Rhythm: Normal rate and regular rhythm.      Pulses: Normal pulses.      Heart sounds: Normal heart sounds. No murmur heard.  Pulmonary:      Effort: Pulmonary effort is normal. No respiratory distress.      Breath sounds: Normal breath sounds.   Abdominal:      " General: Bowel sounds are normal.      Palpations: Abdomen is soft. There is no hepatomegaly or splenomegaly.      Tenderness: There is no abdominal tenderness. There is no guarding.   Musculoskeletal:      Cervical back: Normal range of motion and neck supple.      Right lower leg: No edema.      Left lower leg: No edema.   Skin:     General: Skin is warm and dry.      Findings: No rash.   Neurological:      Mental Status: She is alert and oriented to person, place, and time.      Gait: Gait normal.   Psychiatric:         Mood and Affect: Mood normal.         Behavior: Behavior normal.         Thought Content: Thought content normal.         Cognition and Memory: Cognition normal.         Judgment: Judgment normal.         Lab Results   Component Value Date    WBC 8.30 05/31/2024    RBC 3.91 05/31/2024    HGB 12.3 05/31/2024    HCT 36.4 05/31/2024    MCV 93.1 05/31/2024    MCH 31.5 05/31/2024    MCHC 33.8 05/31/2024    RDW 12.0 (L) 05/31/2024    RDWSD 41.6 09/15/2022    MPV 9.5 09/15/2022     05/31/2024    NEUTRORELPCT 65.2 05/31/2024    LYMPHORELPCT 27.2 05/31/2024    MONORELPCT 4.7 (L) 05/31/2024    EOSRELPCT 2.0 05/31/2024    BASORELPCT 0.5 05/31/2024    AUTOIGPER 0.8 (H) 09/15/2022    NEUTROABS 5.41 05/31/2024    LYMPHSABS 2.26 05/31/2024    MONOSABS 0.39 05/31/2024    EOSABS 0.17 05/31/2024    BASOSABS 0.04 05/31/2024    AUTOIGNUM 0.13 (H) 09/15/2022    NRBC 0.0 05/31/2024     Lab Results   Component Value Date    GLUCOSE 118 (H) 05/31/2024    BUN 8 05/31/2024    CREATININE 0.69 05/31/2024    EGFRIFNONA 101 11/10/2021    EGFRIFAFRI 117 11/10/2021    BCR 11.6 05/31/2024    K 4.8 05/31/2024    CO2 26.1 05/31/2024    CALCIUM 9.5 05/31/2024    PROTENTOTREF 6.9 05/31/2024    ALBUMIN 4.5 05/31/2024    LABIL2 1.9 05/31/2024    AST 15 05/31/2024    ALT 17 05/31/2024      Lab Results   Component Value Date    CHLPL 188 05/31/2024    TRIG 105 05/31/2024    HDL 59 05/31/2024    VLDL 19 05/31/2024     (H)  05/31/2024     Lab Results   Component Value Date    HGBA1C 6.40 (H) 05/31/2024         Assessment    Problem List Items Addressed This Visit       Heartburn    Current Assessment & Plan     Not well-controlled.  Increase Omeprazole to 40 mg daily.         Relevant Medications    omeprazole (priLOSEC) 40 MG capsule    Other Relevant Orders    Comprehensive Metabolic Panel    CBC & Differential    Nonintractable headache    Current Assessment & Plan     Increase intake of clear liquids and rest.  Continue daily allergy medicine.         Type 2 diabetes mellitus without complication, without long-term current use of insulin - Primary    Current Assessment & Plan     Diabetes is  pending lab results .   Continue current treatment regimen.  Regular aerobic exercise.  Reminded to get yearly retinal exam.  Diabetes will be reassessed in 3 months  Continue Metformin twice daily.         Relevant Medications    metFORMIN ER (GLUCOPHAGE-XR) 500 MG 24 hr tablet    Other Relevant Orders    Hemoglobin A1c    Comprehensive Metabolic Panel    Lipid Panel With LDL / HDL Ratio    Vitamin B12 deficiency    Relevant Orders    Vitamin B12 & Folate    Early satiety    Current Assessment & Plan     Increase Omeprazole to 40 mg daily and see if helps appetite.         Relevant Orders    Comprehensive Metabolic Panel    CBC & Differential       Return in about 3 months (around 2/27/2025) for Annual physical, Recheck.or sooner if symptoms persist or worsen.  Will plan to add Januvia if A1c increased on labs.

## 2024-11-27 NOTE — PATIENT INSTRUCTIONS
Increase Omeprazole to 40 mg daily and see if helps appetite.  Continue to monitor your blood sugar once daily before a meal and record results.  Continue to monitor your blood pressure periodically and record results.  Continue to work on healthy diet and exercise.  Follow up pending lab results.  Follow up in 3 months for physical with fasting labs, or sooner if problems or concerns.

## 2024-11-28 LAB
ALBUMIN SERPL-MCNC: 4.4 G/DL (ref 3.5–5.2)
ALBUMIN/GLOB SERPL: 1.8 G/DL
ALP SERPL-CCNC: 63 U/L (ref 39–117)
ALT SERPL-CCNC: 46 U/L (ref 1–33)
AST SERPL-CCNC: 31 U/L (ref 1–32)
BASOPHILS # BLD AUTO: 0.04 10*3/MM3 (ref 0–0.2)
BASOPHILS NFR BLD AUTO: 0.5 % (ref 0–1.5)
BILIRUB SERPL-MCNC: 0.7 MG/DL (ref 0–1.2)
BUN SERPL-MCNC: 8 MG/DL (ref 6–20)
BUN/CREAT SERPL: 11.4 (ref 7–25)
CALCIUM SERPL-MCNC: 9.5 MG/DL (ref 8.6–10.5)
CHLORIDE SERPL-SCNC: 101 MMOL/L (ref 98–107)
CHOLEST SERPL-MCNC: 195 MG/DL (ref 0–200)
CO2 SERPL-SCNC: 26 MMOL/L (ref 22–29)
CREAT SERPL-MCNC: 0.7 MG/DL (ref 0.57–1)
EGFRCR SERPLBLD CKD-EPI 2021: 114.4 ML/MIN/1.73
EOSINOPHIL # BLD AUTO: 0.15 10*3/MM3 (ref 0–0.4)
EOSINOPHIL NFR BLD AUTO: 2 % (ref 0.3–6.2)
ERYTHROCYTE [DISTWIDTH] IN BLOOD BY AUTOMATED COUNT: 11.7 % (ref 12.3–15.4)
FOLATE SERPL-MCNC: 12.7 NG/ML (ref 4.78–24.2)
GLOBULIN SER CALC-MCNC: 2.5 GM/DL
GLUCOSE SERPL-MCNC: 137 MG/DL (ref 65–99)
HBA1C MFR BLD: 6.7 % (ref 4.8–5.6)
HCT VFR BLD AUTO: 38.6 % (ref 34–46.6)
HDLC SERPL-MCNC: 56 MG/DL (ref 40–60)
HGB BLD-MCNC: 13.1 G/DL (ref 12–15.9)
IMM GRANULOCYTES # BLD AUTO: 0.03 10*3/MM3 (ref 0–0.05)
IMM GRANULOCYTES NFR BLD AUTO: 0.4 % (ref 0–0.5)
LDLC SERPL CALC-MCNC: 121 MG/DL (ref 0–100)
LDLC/HDLC SERPL: 2.11 {RATIO}
LYMPHOCYTES # BLD AUTO: 2.9 10*3/MM3 (ref 0.7–3.1)
LYMPHOCYTES NFR BLD AUTO: 38 % (ref 19.6–45.3)
MCH RBC QN AUTO: 31.1 PG (ref 26.6–33)
MCHC RBC AUTO-ENTMCNC: 33.9 G/DL (ref 31.5–35.7)
MCV RBC AUTO: 91.7 FL (ref 79–97)
MONOCYTES # BLD AUTO: 0.47 10*3/MM3 (ref 0.1–0.9)
MONOCYTES NFR BLD AUTO: 6.2 % (ref 5–12)
NEUTROPHILS # BLD AUTO: 4.05 10*3/MM3 (ref 1.7–7)
NEUTROPHILS NFR BLD AUTO: 52.9 % (ref 42.7–76)
NRBC BLD AUTO-RTO: 0 /100 WBC (ref 0–0.2)
PLATELET # BLD AUTO: 402 10*3/MM3 (ref 140–450)
POTASSIUM SERPL-SCNC: 5 MMOL/L (ref 3.5–5.2)
PROT SERPL-MCNC: 6.9 G/DL (ref 6–8.5)
RBC # BLD AUTO: 4.21 10*6/MM3 (ref 3.77–5.28)
SODIUM SERPL-SCNC: 138 MMOL/L (ref 136–145)
TRIGL SERPL-MCNC: 103 MG/DL (ref 0–150)
VIT B12 SERPL-MCNC: >2000 PG/ML (ref 211–946)
VLDLC SERPL CALC-MCNC: 18 MG/DL (ref 5–40)
WBC # BLD AUTO: 7.64 10*3/MM3 (ref 3.4–10.8)

## 2024-11-28 NOTE — ASSESSMENT & PLAN NOTE
Diabetes is  pending lab results .   Continue current treatment regimen.  Regular aerobic exercise.  Reminded to get yearly retinal exam.  Diabetes will be reassessed in 3 months  Continue Metformin twice daily.

## 2024-11-29 DIAGNOSIS — E11.9 TYPE 2 DIABETES MELLITUS WITHOUT COMPLICATION, WITHOUT LONG-TERM CURRENT USE OF INSULIN: Primary | ICD-10-CM

## 2024-11-29 DIAGNOSIS — E53.8 VITAMIN B12 DEFICIENCY: ICD-10-CM

## 2024-11-29 RX ORDER — LANOLIN ALCOHOL/MO/W.PET/CERES
1000 CREAM (GRAM) TOPICAL EVERY OTHER DAY
Start: 2024-11-29

## 2024-12-02 DIAGNOSIS — E11.9 TYPE 2 DIABETES MELLITUS WITHOUT COMPLICATION, WITHOUT LONG-TERM CURRENT USE OF INSULIN: Primary | ICD-10-CM

## 2024-12-02 RX ORDER — BLOOD SUGAR DIAGNOSTIC
STRIP MISCELLANEOUS
Qty: 100 EACH | Refills: 2 | Status: SHIPPED | OUTPATIENT
Start: 2024-12-02

## 2024-12-06 DIAGNOSIS — E11.9 TYPE 2 DIABETES MELLITUS WITHOUT COMPLICATION, WITHOUT LONG-TERM CURRENT USE OF INSULIN: Primary | ICD-10-CM

## 2024-12-06 RX ORDER — LANCETS
EACH MISCELLANEOUS
Qty: 100 EACH | Refills: 2 | Status: SHIPPED | OUTPATIENT
Start: 2024-12-06 | End: 2025-12-06

## 2024-12-16 RX ORDER — FLUCONAZOLE 150 MG/1
150 TABLET ORAL ONCE
Qty: 1 TABLET | Refills: 0 | Status: SHIPPED | OUTPATIENT
Start: 2024-12-16 | End: 2024-12-16

## 2024-12-23 ENCOUNTER — OFFICE VISIT (OUTPATIENT)
Dept: OBSTETRICS AND GYNECOLOGY | Facility: CLINIC | Age: 39
End: 2024-12-23
Payer: COMMERCIAL

## 2024-12-23 VITALS — BODY MASS INDEX: 30.2 KG/M2 | WEIGHT: 187 LBS | DIASTOLIC BLOOD PRESSURE: 84 MMHG | SYSTOLIC BLOOD PRESSURE: 122 MMHG

## 2024-12-23 DIAGNOSIS — N39.3 SUI (STRESS URINARY INCONTINENCE, FEMALE): Primary | ICD-10-CM

## 2024-12-23 NOTE — PROGRESS NOTES
HPI   Neli Bravo  is a 39 y.o. female who presents for 2 reasons.  First, she would like to have a routine gynecologic exam.  Overall, she is feeling well.  She is using low Loestrin for contraception and is satisfied with this.  Next, she continues to have a loss of urine with laughing, coughing and sneezing.    Chief Complaint   Patient presents with    Annual Exam     History of abnormal paps         Past Medical History:   Diagnosis Date    Acne     Allergic rhinitis     Anxiety 2021    COVID-19 virus infection 2022    Diabetes mellitus     Diabetes mellitus during pregnancy treated with insulin 2022    Herpes     Injury of back     Insulin controlled gestational diabetes mellitus (GDM) in third trimester 2022    Migraine headache     Positive depression screening 2021    Varicella        Past Surgical History:   Procedure Laterality Date    WISDOM TOOTH EXTRACTION         Social History     Socioeconomic History    Marital status: Single   Tobacco Use    Smoking status: Former     Current packs/day: 0.00     Types: Cigarettes     Quit date: 2020     Years since quittin.9    Smokeless tobacco: Never    Tobacco comments:     previously smoked less than 1/2 pack per day for 3 years   Vaping Use    Vaping status: Never Used   Substance and Sexual Activity    Alcohol use: Yes     Alcohol/week: 3.0 standard drinks of alcohol     Types: 3 Cans of beer per week     Comment: occasional    Drug use: No    Sexual activity: Yes     Partners: Male     Birth control/protection: Birth control pill       The following portions of the patient's history were reviewed and updated as appropriate: allergies, current medications, past family history, past medical history, past social history, past surgical history and problem list.    Review of Systems     This is positive for urinary incontinence.  Negative for fever or chills.  Negative for nausea or vomiting.  All other systems are  reviewed and are negative.    Physical Exam  Vitals and nursing note reviewed.   Constitutional:       Appearance: She is well-developed.   HENT:      Head: Normocephalic and atraumatic.   Cardiovascular:      Rate and Rhythm: Normal rate and regular rhythm.   Pulmonary:      Effort: Pulmonary effort is normal.      Breath sounds: Normal breath sounds. No wheezing or rales.   Chest:      Comments: The breasts are homogeneous.  There are no palpable lumps.  Nipple discharge and axillary adenopathy are absent.  Abdominal:      General: There is no distension.      Palpations: Abdomen is soft.      Tenderness: There is no abdominal tenderness.   Genitourinary:     Labia:         Right: No lesion.         Left: No lesion.       Vagina: Normal. No vaginal discharge.      Cervix: No cervical motion tenderness.      Uterus: Normal. Not enlarged and not tender.       Adnexa:         Right: No mass or tenderness.          Left: No mass or tenderness.     Skin:     General: Skin is warm and dry.   Neurological:      Mental Status: She is alert and oriented to person, place, and time.         Assessment    Diagnoses and all orders for this visit:    1. FREDERICK (stress urinary incontinence, female) (Primary)  -     Ambulatory Referral to Physical Therapy for Evaluation & Treatment    Other orders  -     norethindrone-ethinyl estradiol-ferrous fumarate (LOESTIN 24 FE) 1-20 MG-MCG(24) per tablet; Take 1 tablet by mouth Daily.  Dispense: 90 tablet; Refill: 3        Plan  Annual examination performed  Contraceptive counseling.  Counseled regarding the benefits and risks of continuing estrogen containing birth control.  The patient is diabetic, but not hypertensive.  She would like to continue low Loestrin.  Her prescription has been refilled.  Stress urinary incontinence.  Counseled regarding the pathophysiology of this condition and options for its management.  The benefits and risks of continued expectant management versus pelvic  floor muscle physical therapy versus referral to urological gynecologist for surgical correction were discussed.  The patient would like to try pelvic floor muscle physical therapy.  A referral has been sent.    Return in about 1 year (around 2025).    Social History     Tobacco Use   Smoking Status Former    Current packs/day: 0.00    Types: Cigarettes    Quit date: 2020    Years since quittin.9   Smokeless Tobacco Never   Tobacco Comments    previously smoked less than 1/2 pack per day for 3 years

## 2024-12-30 LAB
CONV .: NORMAL
CYTOLOGIST CVX/VAG CYTO: NORMAL
CYTOLOGY CVX/VAG DOC CYTO: NORMAL
CYTOLOGY CVX/VAG DOC THIN PREP: NORMAL
DX ICD CODE: NORMAL
Lab: NORMAL
OTHER STN SPEC: NORMAL
STAT OF ADQ CVX/VAG CYTO-IMP: NORMAL

## 2025-03-03 ENCOUNTER — OFFICE VISIT (OUTPATIENT)
Dept: FAMILY MEDICINE CLINIC | Facility: CLINIC | Age: 40
End: 2025-03-03
Payer: COMMERCIAL

## 2025-03-03 VITALS
TEMPERATURE: 97.7 F | HEIGHT: 66 IN | WEIGHT: 188.6 LBS | SYSTOLIC BLOOD PRESSURE: 128 MMHG | BODY MASS INDEX: 30.31 KG/M2 | HEART RATE: 79 BPM | DIASTOLIC BLOOD PRESSURE: 80 MMHG | OXYGEN SATURATION: 100 %

## 2025-03-03 DIAGNOSIS — R51.9 NONINTRACTABLE EPISODIC HEADACHE, UNSPECIFIED HEADACHE TYPE: ICD-10-CM

## 2025-03-03 DIAGNOSIS — Z00.00 ENCOUNTER FOR ANNUAL PHYSICAL EXAM: Primary | ICD-10-CM

## 2025-03-03 DIAGNOSIS — E66.811 CLASS 1 OBESITY WITH SERIOUS COMORBIDITY AND BODY MASS INDEX (BMI) OF 30.0 TO 30.9 IN ADULT, UNSPECIFIED OBESITY TYPE: ICD-10-CM

## 2025-03-03 DIAGNOSIS — R12 HEARTBURN: ICD-10-CM

## 2025-03-03 DIAGNOSIS — E11.9 TYPE 2 DIABETES MELLITUS WITHOUT COMPLICATION, WITHOUT LONG-TERM CURRENT USE OF INSULIN: ICD-10-CM

## 2025-03-03 DIAGNOSIS — J30.9 ALLERGIC RHINITIS, UNSPECIFIED SEASONALITY, UNSPECIFIED TRIGGER: ICD-10-CM

## 2025-03-03 DIAGNOSIS — E53.8 VITAMIN B12 DEFICIENCY: ICD-10-CM

## 2025-03-03 PROCEDURE — 99395 PREV VISIT EST AGE 18-39: CPT | Performed by: NURSE PRACTITIONER

## 2025-03-03 NOTE — PROGRESS NOTES
Subjective   Neli Bravo is a 39 y.o. female.     Chief Complaint   Patient presents with    Annual Exam       History of Present Illness   Patient presents for CPE with fasting labs; no new problems or concerns today.  Nutrition:  pretty healthy diet; has been doing carnivore diet for last 2 weeks  Exercise:  not much exercise  Dental:  regular dental visits, three times per year  Vision:  last eye exam 8/2024, no vision correction  Hearing:  no problems hearing   Immunizations:  declines flu, COVID-19, and PCV20 vaccines  PAP:  sees Dr. Schilling for female care; last PAP 12/23/24  Had recent PT for pelvic floor therapy and helped stress incontinence  Mammo:  mammo never performed; paternal GM with family history of breast cancer, diagnosed in late 70s  Colonoscopy/Cologuard:  never performed; no family history of colon cancer     F/U DM2: takes Metformin twice daily; last A1c 6.7% and tried to add Januvia daily; but insurance did not cover; monitors blood sugar, typically runs 100-140s; has been eating smaller meals TID and has helped; watches carbs/sugars in diet; not much exercise; no numbness/tingling; last eye exam at [facility] in August/September.     F/U heartburn: takes Omeprazole and works well; increased dose and helped nausea; has decreased to every other day and working well; tries to avoid eating after 7:00 p.m. and helps.     F/U headaches: some dull headaches at times recently, but no migraines for long time; will have ongoing ache, sometimes resolves with Ibuprofen, sometimes comes back after Ibuprofen wears off; drinks a lot of water and takes daily allergy med and helps some; has been taking Ibuprofen as needed and helps; no change in vision or nausea/vomiting with headaches.     F/U Vitamin B12 deficiency: takes Vitamin B12 every other day.         The following portions of the patient's history were reviewed and updated as appropriate: allergies, current medications, past family  history, past medical history, past social history, past surgical history and problem list.    Current Outpatient Medications on File Prior to Visit   Medication Sig    Accu-Chek Softclix Lancets lancets Use as instructed to check blood sugar daily    glucose blood (Accu-Chek Myriam Plus) test strip Test blood sugar once daily and as needed.    loratadine (Claritin) 10 MG tablet Take 1 tablet by mouth Daily.    metFORMIN ER (GLUCOPHAGE-XR) 500 MG 24 hr tablet Take 2 tablets by mouth 2 (Two) Times a Day With Meals.    norethindrone-ethinyl estradiol-ferrous fumarate (LOESTIN 24 FE) 1-20 MG-MCG(24) per tablet Take 1 tablet by mouth Daily.    omeprazole (priLOSEC) 40 MG capsule Take 1 capsule by mouth Daily.    valACYclovir (VALTREX) 500 MG tablet Take 1 tablet by mouth Every 12 (Twelve) Hours. For 3 days as needed for flares.    vitamin B-12 (CYANOCOBALAMIN) 1000 MCG tablet Take 1 tablet by mouth Every Other Day.     No current facility-administered medications on file prior to visit.        Past Medical History:   Diagnosis Date    Acne     Allergic rhinitis     Anxiety 01/12/2021    COVID-19 virus infection 07/19/2022    Diabetes mellitus     Diabetes mellitus during pregnancy treated with insulin 09/13/2022    Herpes     Injury of back     Insulin controlled gestational diabetes mellitus (GDM) in third trimester 09/07/2022    Migraine headache     Positive depression screening 01/12/2021    Varicella        Past Surgical History:   Procedure Laterality Date    WISDOM TOOTH EXTRACTION         Family History   Problem Relation Age of Onset    Heart disease Mother         has AICD due to ventricular arrhythmia    Arrhythmia Mother     Breast cancer Paternal Grandmother 70    Diabetes Maternal Grandmother     Heart attack Maternal Grandmother         in her 40s    Hypertension Maternal Grandfather        Social History     Socioeconomic History    Marital status: Single   Tobacco Use    Smoking status: Former      Current packs/day: 0.00     Types: Cigarettes     Quit date: 2020     Years since quittin.1    Smokeless tobacco: Never    Tobacco comments:     previously smoked less than 1/2 pack per day for 3 years   Vaping Use    Vaping status: Never Used   Substance and Sexual Activity    Alcohol use: Yes     Alcohol/week: 3.0 standard drinks of alcohol     Types: 3 Cans of beer per week     Comment: occasional    Drug use: No    Sexual activity: Yes     Partners: Male     Birth control/protection: Birth control pill       Review of Systems   Constitutional:  Negative for appetite change, chills, fatigue, fever, unexpected weight gain and unexpected weight loss.   HENT:  Negative for sinus pressure, sore throat and trouble swallowing. Ear pain: has had some sensation of fluid in left ear recently; tried Benadryl for few days and helped.   Eyes:  Negative for blurred vision and discharge.   Respiratory:  Negative for cough, chest tightness and shortness of breath.    Cardiovascular:  Negative for chest pain, palpitations and leg swelling.   Gastrointestinal:  Negative for abdominal pain, blood in stool and diarrhea. Constipation: mild with recent change in diet, has added fiber and has helped.  Endocrine: Negative for cold intolerance. Heat intolerance: some. Polydipsia: no change.  Genitourinary:  Negative for dysuria and frequency.   Musculoskeletal:  Negative for arthralgias and back pain.   Skin:  Negative for rash and skin lesions.   Neurological:  Negative for dizziness (one episode recently, attributed to not eating and changes in diet), syncope and weakness. Headache: see HPI.  Hematological:  Does not bruise/bleed easily.   Psychiatric/Behavioral:  Negative for sleep disturbance and depressed mood. The patient is not nervous/anxious.        Objective   Vitals:    25 0817   BP: 128/80   BP Location: Left arm   Patient Position: Sitting   Cuff Size: Adult   Pulse: 79   Temp: 97.7 °F (36.5 °C)   TempSrc:  "Temporal   SpO2: 100%   Weight: 85.5 kg (188 lb 9.6 oz)   Height: 167.6 cm (65.98\")     Body mass index is 30.46 kg/m².    Physical Exam  Vitals and nursing note reviewed.   Constitutional:       General: She is not in acute distress.     Appearance: She is well-developed and well-groomed. She is not diaphoretic.   HENT:      Head: Normocephalic and atraumatic.      Jaw: No tenderness or pain on movement.      Right Ear: Tympanic membrane and external ear normal. No decreased hearing noted.      Left Ear: External ear normal. No decreased hearing noted. Left ear middle ear effusion: mild. Tympanic membrane is not erythematous.      Nose: Nose normal.      Right Sinus: No maxillary sinus tenderness or frontal sinus tenderness.      Left Sinus: No maxillary sinus tenderness or frontal sinus tenderness.      Mouth/Throat:      Mouth: Mucous membranes are moist.      Pharynx: No oropharyngeal exudate or posterior oropharyngeal erythema.   Eyes:      Extraocular Movements: Extraocular movements intact.      Conjunctiva/sclera: Conjunctivae normal.      Pupils: Pupils are equal, round, and reactive to light.   Neck:      Thyroid: No thyromegaly.      Vascular: No carotid bruit.      Trachea: No tracheal deviation.   Cardiovascular:      Rate and Rhythm: Normal rate and regular rhythm.      Pulses: Normal pulses.           Dorsalis pedis pulses are 2+ on the right side and 2+ on the left side.        Posterior tibial pulses are 1+ on the right side and 1+ on the left side.      Heart sounds: Normal heart sounds. No murmur heard.  Pulmonary:      Effort: Pulmonary effort is normal. No respiratory distress.      Breath sounds: Normal breath sounds.   Abdominal:      General: Bowel sounds are normal.      Palpations: Abdomen is soft. There is no hepatomegaly or splenomegaly.      Tenderness: There is no abdominal tenderness. There is no guarding.   Musculoskeletal:         General: Normal range of motion.      Cervical back: " Normal range of motion and neck supple. No bony tenderness.      Thoracic back: No bony tenderness.      Lumbar back: No bony tenderness.      Right lower leg: No edema.      Left lower leg: No edema.   Feet:      Right foot:      Protective Sensation: 10 sites tested.  10 sites sensed.      Skin integrity: Skin integrity normal.      Left foot:      Protective Sensation: 10 sites tested.  10 sites sensed.      Skin integrity: Skin integrity normal.      Comments: Diabetic Foot Exam Performed and Monofilament Test Performed    Lymphadenopathy:      Cervical: No cervical adenopathy.   Skin:     General: Skin is warm and dry.      Findings: No rash.   Neurological:      Mental Status: She is alert and oriented to person, place, and time.      Cranial Nerves: No cranial nerve deficit.      Motor: Motor function is intact.      Coordination: Coordination normal.      Gait: Gait normal.      Deep Tendon Reflexes: Reflexes are normal and symmetric.   Psychiatric:         Mood and Affect: Mood normal.         Behavior: Behavior normal.         Thought Content: Thought content normal.         Cognition and Memory: Cognition normal.         Judgment: Judgment normal.         Lab Results   Component Value Date    WBC 7.64 11/27/2024    RBC 4.21 11/27/2024    HGB 13.1 11/27/2024    HCT 38.6 11/27/2024    MCV 91.7 11/27/2024    MCH 31.1 11/27/2024    MCHC 33.9 11/27/2024    RDW 11.7 (L) 11/27/2024    RDWSD 41.6 09/15/2022    MPV 9.5 09/15/2022     11/27/2024    NEUTRORELPCT 52.9 11/27/2024    LYMPHORELPCT 38.0 11/27/2024    MONORELPCT 6.2 11/27/2024    EOSRELPCT 2.0 11/27/2024    BASORELPCT 0.5 11/27/2024    AUTOIGPER 0.8 (H) 09/15/2022    NEUTROABS 4.05 11/27/2024    LYMPHSABS 2.90 11/27/2024    MONOSABS 0.47 11/27/2024    EOSABS 0.15 11/27/2024    BASOSABS 0.04 11/27/2024    AUTOIGNUM 0.13 (H) 09/15/2022    NRBC 0.0 11/27/2024     Lab Results   Component Value Date    GLUCOSE 137 (H) 11/27/2024    BUN 8 11/27/2024     CREATININE 0.70 11/27/2024    EGFRIFNONA 101 11/10/2021    EGFRIFAFRI 117 11/10/2021    BCR 11.4 11/27/2024    K 5.0 11/27/2024    CO2 26.0 11/27/2024    CALCIUM 9.5 11/27/2024    ALBUMIN 4.4 11/27/2024    AST 31 11/27/2024    ALT 46 (H) 11/27/2024      Lab Results   Component Value Date    CHLPL 195 11/27/2024    TRIG 103 11/27/2024    HDL 56 11/27/2024    VLDL 18 11/27/2024     (H) 11/27/2024     Lab Results   Component Value Date    HGBA1C 6.70 (H) 11/27/2024       Assessment    Problem List Items Addressed This Visit       Heartburn    Current Assessment & Plan     Stable.  Continue Omeprazole every other day.         Nonintractable headache    Current Assessment & Plan     Try nasal steroid, such as Flonase or Nasacort.         Allergic rhinitis    Current Assessment & Plan     Continue Claritin daily.  Try over the counter nasal steroid, such as Flonase or Nasacort.         Type 2 diabetes mellitus without complication, without long-term current use of insulin    Current Assessment & Plan     Diabetes is  pending lab results .   Continue current treatment regimen.  Regular aerobic exercise.  Reminded to get yearly retinal exam.  Diabetes will be reassessed  4 months  Continue Metformin twice daily.  Check with insurance to see if would cover Janumet.         Relevant Orders    Microalbumin / Creatinine Urine Ratio - Urine, Clean Catch    Comprehensive Metabolic Panel    Hemoglobin A1c    Lipid Panel With LDL / HDL Ratio    Class 1 obesity with serious comorbidity and body mass index (BMI) of 30.0 to 30.9 in adult    Current Assessment & Plan     Patient's (Body mass index is 30.46 kg/m².) indicates that they are obese (BMI >30) with health conditions that include diabetes mellitus . Weight is unchanged. BMI  is above average; BMI management plan is completed. We discussed low calorie, low carb based diet program, portion control, and increasing exercise.          Vitamin B12 deficiency    Relevant  Orders    Vitamin B12 & Folate     Other Visit Diagnoses       Encounter for annual physical exam    -  Primary    Relevant Orders    Microalbumin / Creatinine Urine Ratio - Urine, Clean Catch    Comprehensive Metabolic Panel    Hemoglobin A1c    Lipid Panel With LDL / HDL Ratio    Vitamin B12 & Folate             Return in about 4 months (around 7/3/2025) for Recheck.or sooner if symptoms persist or worsen.  Impression: Health maintenance visit.  Currently, eats a pretty healthy diet and has an inadequate exercise routine.  Cervical cancer screening: UTD per GYN.  Breast cancer screening: not indicated.  Colorectal cancer screening: not indicated.  Screening lab work includes: CMP, lipid.  Immunizations: declines flu, COVID-19, and PCV20 vaccines; risks and benefits of immunizations were discussed with patient.  Advice and education were given regarding nutrition and aerobic exercise.

## 2025-03-03 NOTE — PATIENT INSTRUCTIONS
Check with insurance to see if would cover Janumet.  Try over the counter nasal steroid, such as Flonase or Nasacort.  Continue to monitor your blood sugar once daily before a meal and record results.  Continue to monitor your blood pressure periodically and record results.  Continue to work on healthy diet and exercise.  Follow up pending lab results.  Follow up in 4 months, or sooner if problems or concerns.

## 2025-03-04 LAB
ALBUMIN SERPL-MCNC: 4.2 G/DL (ref 3.5–5.2)
ALBUMIN/CREAT UR: 32 MG/G CREAT (ref 0–29)
ALBUMIN/GLOB SERPL: 1.5 G/DL
ALP SERPL-CCNC: 45 U/L (ref 39–117)
ALT SERPL-CCNC: 44 U/L (ref 1–33)
AST SERPL-CCNC: 37 U/L (ref 1–32)
BILIRUB SERPL-MCNC: 0.5 MG/DL (ref 0–1.2)
BUN SERPL-MCNC: 10 MG/DL (ref 6–20)
BUN/CREAT SERPL: 14.3 (ref 7–25)
CALCIUM SERPL-MCNC: 9.4 MG/DL (ref 8.6–10.5)
CHLORIDE SERPL-SCNC: 96 MMOL/L (ref 98–107)
CHOLEST SERPL-MCNC: 223 MG/DL (ref 0–200)
CO2 SERPL-SCNC: 23.5 MMOL/L (ref 22–29)
CREAT SERPL-MCNC: 0.7 MG/DL (ref 0.57–1)
CREAT UR-MCNC: 56.2 MG/DL
EGFRCR SERPLBLD CKD-EPI 2021: 114.4 ML/MIN/1.73
FOLATE SERPL-MCNC: >20 NG/ML (ref 4.78–24.2)
GLOBULIN SER CALC-MCNC: 2.8 GM/DL
GLUCOSE SERPL-MCNC: 114 MG/DL (ref 65–99)
HBA1C MFR BLD: 7 % (ref 4.8–5.6)
HDLC SERPL-MCNC: 64 MG/DL (ref 40–60)
LDLC SERPL CALC-MCNC: 138 MG/DL (ref 0–100)
LDLC/HDLC SERPL: 2.12 {RATIO}
MICROALBUMIN UR-MCNC: 17.9 UG/ML
POTASSIUM SERPL-SCNC: 4.3 MMOL/L (ref 3.5–5.2)
PROT SERPL-MCNC: 7 G/DL (ref 6–8.5)
SODIUM SERPL-SCNC: 134 MMOL/L (ref 136–145)
TRIGL SERPL-MCNC: 118 MG/DL (ref 0–150)
VIT B12 SERPL-MCNC: 1175 PG/ML (ref 211–946)
VLDLC SERPL CALC-MCNC: 21 MG/DL (ref 5–40)

## 2025-03-04 NOTE — ASSESSMENT & PLAN NOTE
Patient's (Body mass index is 30.46 kg/m².) indicates that they are obese (BMI >30) with health conditions that include diabetes mellitus . Weight is unchanged. BMI  is above average; BMI management plan is completed. We discussed low calorie, low carb based diet program, portion control, and increasing exercise.

## 2025-03-04 NOTE — ASSESSMENT & PLAN NOTE
Diabetes is  pending lab results .   Continue current treatment regimen.  Regular aerobic exercise.  Reminded to get yearly retinal exam.  Diabetes will be reassessed  4 months  Continue Metformin twice daily.  Check with insurance to see if would cover Janumet.

## 2025-03-05 DIAGNOSIS — E11.9 TYPE 2 DIABETES MELLITUS WITHOUT COMPLICATION, WITHOUT LONG-TERM CURRENT USE OF INSULIN: Primary | ICD-10-CM

## 2025-03-05 RX ORDER — SITAGLIPTIN AND METFORMIN HYDROCHLORIDE 1000; 50 MG/1; MG/1
1 TABLET, FILM COATED, EXTENDED RELEASE ORAL 2 TIMES DAILY WITH MEALS
Qty: 60 TABLET | Refills: 2 | Status: SHIPPED | OUTPATIENT
Start: 2025-03-05

## 2025-03-06 ENCOUNTER — TELEPHONE (OUTPATIENT)
Dept: FAMILY MEDICINE CLINIC | Facility: CLINIC | Age: 40
End: 2025-03-06
Payer: COMMERCIAL

## 2025-03-06 NOTE — TELEPHONE ENCOUNTER
Please call patient to see if she was able to get the Janumet.  Also, please have patient schedule an appointment for labs to recheck sodium, chloride, and liver enzymes in 3-4 weeks.  Thank you.

## 2025-03-07 ENCOUNTER — TELEPHONE (OUTPATIENT)
Dept: FAMILY MEDICINE CLINIC | Facility: CLINIC | Age: 40
End: 2025-03-07
Payer: COMMERCIAL

## 2025-03-07 NOTE — TELEPHONE ENCOUNTER
The patient called back and is scheduled for labs in April. She also said that the pharmacy doesn't have the Janumet ready yet. She is hoping it will be ready today.

## 2025-03-07 NOTE — TELEPHONE ENCOUNTER
Called patient left  for patient to call back and get on the lab schedule in 3-4 weeks. And also let us know if she is taking the janumet medication

## 2025-04-01 DIAGNOSIS — R12 HEARTBURN: ICD-10-CM

## 2025-04-01 RX ORDER — OMEPRAZOLE 40 MG/1
40 CAPSULE, DELAYED RELEASE ORAL DAILY
Qty: 30 CAPSULE | Refills: 2 | Status: SHIPPED | OUTPATIENT
Start: 2025-04-01

## 2025-06-07 DIAGNOSIS — E11.9 TYPE 2 DIABETES MELLITUS WITHOUT COMPLICATION, WITHOUT LONG-TERM CURRENT USE OF INSULIN: ICD-10-CM

## 2025-06-09 RX ORDER — SITAGLIPTIN AND METFORMIN HYDROCHLORIDE 1000; 50 MG/1; MG/1
1 TABLET, FILM COATED, EXTENDED RELEASE ORAL 2 TIMES DAILY WITH MEALS
Qty: 60 TABLET | Refills: 0 | Status: SHIPPED | OUTPATIENT
Start: 2025-06-09

## 2025-07-07 DIAGNOSIS — E11.9 TYPE 2 DIABETES MELLITUS WITHOUT COMPLICATION, WITHOUT LONG-TERM CURRENT USE OF INSULIN: ICD-10-CM

## 2025-07-07 RX ORDER — SITAGLIPTIN AND METFORMIN HYDROCHLORIDE 1000; 50 MG/1; MG/1
1 TABLET, FILM COATED, EXTENDED RELEASE ORAL 2 TIMES DAILY WITH MEALS
Qty: 60 TABLET | Refills: 3 | Status: SHIPPED | OUTPATIENT
Start: 2025-07-07

## 2025-07-07 NOTE — TELEPHONE ENCOUNTER
Rx Refill Note  Requested Prescriptions     Pending Prescriptions Disp Refills    Janumet XR  MG tablet [Pharmacy Med Name: JANUMET XR 50MG/1000MG TABLETS] 60 tablet 0     Sig: TAKE 1 TABLET BY MOUTH TWICE DAILY WITH MEALS      Last office visit with prescribing clinician: 3/3/2025   Last telemedicine visit with prescribing clinician: Visit date not found   Next office visit with prescribing clinician: 7/15/2025                         Would you like a call back once the refill request has been completed: [] Yes [] No    If the office needs to give you a call back, can they leave a voicemail: [] Yes [] No    Lizette Sánchez MA  07/07/25, 13:37 EDT

## 2025-07-15 ENCOUNTER — OFFICE VISIT (OUTPATIENT)
Dept: FAMILY MEDICINE CLINIC | Facility: CLINIC | Age: 40
End: 2025-07-15
Payer: COMMERCIAL

## 2025-07-15 VITALS
HEIGHT: 66 IN | HEART RATE: 87 BPM | SYSTOLIC BLOOD PRESSURE: 130 MMHG | WEIGHT: 176.8 LBS | OXYGEN SATURATION: 98 % | BODY MASS INDEX: 28.42 KG/M2 | DIASTOLIC BLOOD PRESSURE: 84 MMHG | TEMPERATURE: 97.7 F

## 2025-07-15 DIAGNOSIS — R12 HEARTBURN: ICD-10-CM

## 2025-07-15 DIAGNOSIS — R51.9 NONINTRACTABLE EPISODIC HEADACHE, UNSPECIFIED HEADACHE TYPE: ICD-10-CM

## 2025-07-15 DIAGNOSIS — E11.9 TYPE 2 DIABETES MELLITUS WITHOUT COMPLICATION, WITHOUT LONG-TERM CURRENT USE OF INSULIN: Primary | ICD-10-CM

## 2025-07-15 PROBLEM — R68.81 EARLY SATIETY: Status: RESOLVED | Noted: 2024-11-27 | Resolved: 2025-07-15

## 2025-07-15 PROCEDURE — 99214 OFFICE O/P EST MOD 30 MIN: CPT | Performed by: NURSE PRACTITIONER

## 2025-07-15 RX ORDER — LISINOPRIL 5 MG/1
5 TABLET ORAL DAILY
Qty: 30 TABLET | Refills: 2 | Status: SHIPPED | OUTPATIENT
Start: 2025-07-15

## 2025-07-15 RX ORDER — OMEPRAZOLE 20 MG/1
20 CAPSULE, DELAYED RELEASE ORAL DAILY PRN
Qty: 90 CAPSULE | Refills: 1 | Status: SHIPPED | OUTPATIENT
Start: 2025-07-15

## 2025-07-15 NOTE — PROGRESS NOTES
Subjective   Neli Bravo is a 40 y.o. female.     Chief Complaint   Patient presents with    Diabetes       History of Present Illness   Patient presents for follow up DM2: takes Janumet twice daily; last A1c 7.0% and changed to Janumet instead of Metformin; no problems with medication; monitors blood sugar, typically runs 130s in mornings;  watches carbs/sugars in diet; not much exercise; occasional tingling of right middle toes; [facility] in August/September 2024.     F/U heartburn: takes Omeprazole as needed and works well; avoids eating avoid 7 p.m. and has helped.     F/U headaches: dull headaches have been intermittent at this point; attributes to change in weather; takes Claritin daily.     F/U Vitamin B12 deficiency: takes Vitamin B12 every other day.       The following portions of the patient's history were reviewed and updated as appropriate: allergies, current medications, past family history, past medical history, past social history, past surgical history and problem list.    Current Outpatient Medications on File Prior to Visit   Medication Sig    Accu-Chek Softclix Lancets lancets Use as instructed to check blood sugar daily    glucose blood (Accu-Chek Myriam Plus) test strip Test blood sugar once daily and as needed.    loratadine (Claritin) 10 MG tablet Take 1 tablet by mouth Daily.    norethindrone-ethinyl estradiol-ferrous fumarate (LOESTIN 24 FE) 1-20 MG-MCG(24) per tablet Take 1 tablet by mouth Daily.    SITaglip Phos-metFORMIN HCl ER (Janumet XR)  MG tablet TAKE 1 TABLET BY MOUTH TWICE DAILY WITH MEALS    valACYclovir (VALTREX) 500 MG tablet Take 1 tablet by mouth Every 12 (Twelve) Hours. For 3 days as needed for flares.    vitamin B-12 (CYANOCOBALAMIN) 1000 MCG tablet Take 1 tablet by mouth Every Other Day.    [DISCONTINUED] omeprazole (priLOSEC) 40 MG capsule TAKE 1 CAPSULE BY MOUTH DAILY     No current facility-administered medications on file prior to  visit.        Past Medical History:   Diagnosis Date    Acne     Allergic rhinitis     Anxiety 2021    COVID-19 virus infection 2022    Diabetes mellitus     Diabetes mellitus during pregnancy treated with insulin 2022    Herpes     Injury of back     Insulin controlled gestational diabetes mellitus (GDM) in third trimester 2022    Migraine headache     Positive depression screening 2021    Varicella        Past Surgical History:   Procedure Laterality Date    WISDOM TOOTH EXTRACTION         Family History   Problem Relation Age of Onset    Heart disease Mother         has AICD due to ventricular arrhythmia    Arrhythmia Mother     Breast cancer Paternal Grandmother 70    Cancer Paternal Grandmother     Diabetes Maternal Grandmother     Heart attack Maternal Grandmother         in her 40s    Hypertension Maternal Grandfather        Social History     Socioeconomic History    Marital status: Single   Tobacco Use    Smoking status: Former     Current packs/day: 0.00     Types: Cigarettes     Quit date: 2020     Years since quittin.5     Passive exposure: Past    Smokeless tobacco: Never    Tobacco comments:     previously smoked less than 1/2 pack per day for 3 years   Vaping Use    Vaping status: Never Used   Substance and Sexual Activity    Alcohol use: Yes     Alcohol/week: 3.0 standard drinks of alcohol     Types: 3 Cans of beer per week     Comment: occasional    Drug use: No    Sexual activity: Yes     Partners: Male     Birth control/protection: Birth control pill       Review of Systems   Constitutional:  Positive for fatigue. Negative for appetite change, chills, fever, unexpected weight gain and unexpected weight loss (no unexpeceted weight loss, has lost weight with healthy diet).   HENT:  Negative for ear pain, sore throat and trouble swallowing.    Eyes:  Negative for blurred vision.   Respiratory:  Negative for cough, chest tightness and shortness of breath.   "  Cardiovascular:  Negative for chest pain, palpitations and leg swelling.   Gastrointestinal:  Negative for abdominal pain, blood in stool, constipation and diarrhea.   Endocrine: Positive for polydipsia.   Genitourinary:  Negative for dysuria. Frequency: no change, drinks a lot of water.  Skin:  Negative for rash.   Neurological:  Negative for dizziness, tremors, speech difficulty, light-headedness and confusion.       Objective   Vitals:    07/15/25 0801   BP: 130/84   BP Location: Left arm   Patient Position: Sitting   Cuff Size: Adult   Pulse: 87   Temp: 97.7 °F (36.5 °C)   TempSrc: Temporal   SpO2: 98%   Weight: 80.2 kg (176 lb 12.8 oz)   Height: 167.6 cm (65.98\")     Body mass index is 28.55 kg/m².    Physical Exam  Vitals and nursing note reviewed.   Constitutional:       General: She is not in acute distress.     Appearance: She is well-developed and well-groomed. She is not diaphoretic.   HENT:      Head: Normocephalic.      Right Ear: External ear normal.      Left Ear: External ear normal.   Eyes:      Conjunctiva/sclera: Conjunctivae normal.   Neck:      Vascular: No carotid bruit.   Cardiovascular:      Rate and Rhythm: Normal rate and regular rhythm.      Pulses: Normal pulses.      Heart sounds: Normal heart sounds. No murmur heard.  Pulmonary:      Effort: Pulmonary effort is normal. No respiratory distress.      Breath sounds: Normal breath sounds.   Abdominal:      General: Bowel sounds are normal.      Palpations: Abdomen is soft. There is no hepatomegaly or splenomegaly.      Tenderness: There is no abdominal tenderness. There is no guarding.   Musculoskeletal:      Cervical back: Normal range of motion and neck supple.      Right lower leg: No edema.      Left lower leg: No edema.   Skin:     General: Skin is warm and dry.      Findings: No rash.   Neurological:      Mental Status: She is alert and oriented to person, place, and time.      Gait: Gait normal.   Psychiatric:         Mood and " Affect: Mood normal.         Behavior: Behavior normal.         Thought Content: Thought content normal.         Cognition and Memory: Cognition normal.         Judgment: Judgment normal.         Lab Results   Component Value Date    WBC 7.64 11/27/2024    RBC 4.21 11/27/2024    HGB 13.1 11/27/2024    HCT 38.6 11/27/2024    MCV 91.7 11/27/2024    MCH 31.1 11/27/2024    MCHC 33.9 11/27/2024    RDW 11.7 (L) 11/27/2024    RDWSD 41.6 09/15/2022    MPV 9.5 09/15/2022     11/27/2024    NEUTRORELPCT 52.9 11/27/2024    LYMPHORELPCT 38.0 11/27/2024    MONORELPCT 6.2 11/27/2024    EOSRELPCT 2.0 11/27/2024    BASORELPCT 0.5 11/27/2024    AUTOIGPER 0.8 (H) 09/15/2022    NEUTROABS 4.05 11/27/2024    LYMPHSABS 2.90 11/27/2024    MONOSABS 0.47 11/27/2024    EOSABS 0.15 11/27/2024    BASOSABS 0.04 11/27/2024    AUTOIGNUM 0.13 (H) 09/15/2022    NRBC 0.0 11/27/2024     Lab Results   Component Value Date    GLUCOSE 122 (H) 04/07/2025    BUN 11 04/07/2025    CREATININE 0.73 04/07/2025    EGFRIFNONA 101 11/10/2021    EGFRIFAFRI 117 11/10/2021    BCR 15.1 04/07/2025    K 4.3 04/07/2025    CO2 25.3 04/07/2025    CALCIUM 9.6 04/07/2025    ALBUMIN 4.5 04/07/2025    AST 19 04/07/2025    ALT 26 04/07/2025      Lab Results   Component Value Date    CHLPL 223 (H) 03/03/2025    TRIG 118 03/03/2025    HDL 64 (H) 03/03/2025    VLDL 21 03/03/2025     (H) 03/03/2025     Lab Results   Component Value Date    HGBA1C 7.00 (H) 03/03/2025           Assessment    Problem List Items Addressed This Visit       Heartburn    Current Assessment & Plan   Stable.  Continue Omeprazole daily as needed.  Will try lower dose Omeprazole and see if tolerated.         Relevant Medications    omeprazole (priLOSEC) 20 MG capsule    Other Relevant Orders    CBC & Differential    Nonintractable headache    Current Assessment & Plan   Stable.  Continue Claritin daily.         Type 2 diabetes mellitus without complication, without long-term current use of  insulin - Primary    Current Assessment & Plan   Diabetes is improving with treatment.   Continue current treatment regimen.  Regular aerobic exercise.  Reminded to get yearly retinal exam.  Diabetes will be reassessed 4 months  Continue Janumet twice daily.         Relevant Medications    lisinopril (PRINIVIL,ZESTRIL) 5 MG tablet    Other Relevant Orders    Lipid Panel With LDL / HDL Ratio    Comprehensive Metabolic Panel    Hemoglobin A1c        Return in about 4 months (around 11/15/2025) for Recheck.or sooner if problems or concerns.  No plans for pregnancy; will start Lisinopril daily for kidney protection; discussed possible SE.  Will send refill of Janumet to mail order pharmacy pending lab results.

## 2025-07-15 NOTE — PATIENT INSTRUCTIONS
Continue to monitor your blood sugar once daily before a meal and record results.  Continue to work on healthy diet and exercise.  Follow up pending lab results.  Follow up in 4 months, or sooner if problems or concerns.

## 2025-07-16 DIAGNOSIS — E11.9 TYPE 2 DIABETES MELLITUS WITHOUT COMPLICATION, WITHOUT LONG-TERM CURRENT USE OF INSULIN: ICD-10-CM

## 2025-07-16 LAB
ALBUMIN SERPL-MCNC: 4 G/DL (ref 3.5–5.2)
ALBUMIN/GLOB SERPL: 1.3 G/DL
ALP SERPL-CCNC: 51 U/L (ref 39–117)
ALT SERPL-CCNC: 16 U/L (ref 1–33)
AST SERPL-CCNC: 16 U/L (ref 1–32)
BASOPHILS # BLD AUTO: 0.03 10*3/MM3 (ref 0–0.2)
BASOPHILS NFR BLD AUTO: 0.4 % (ref 0–1.5)
BILIRUB SERPL-MCNC: 0.6 MG/DL (ref 0–1.2)
BUN SERPL-MCNC: 8 MG/DL (ref 6–20)
BUN/CREAT SERPL: 9.8 (ref 7–25)
CALCIUM SERPL-MCNC: 9.6 MG/DL (ref 8.6–10.5)
CHLORIDE SERPL-SCNC: 100 MMOL/L (ref 98–107)
CHOLEST SERPL-MCNC: 225 MG/DL (ref 0–200)
CO2 SERPL-SCNC: 27.6 MMOL/L (ref 22–29)
CREAT SERPL-MCNC: 0.82 MG/DL (ref 0.57–1)
EGFRCR SERPLBLD CKD-EPI 2021: 94 ML/MIN/1.73
EOSINOPHIL # BLD AUTO: 0.07 10*3/MM3 (ref 0–0.4)
EOSINOPHIL NFR BLD AUTO: 0.9 % (ref 0.3–6.2)
ERYTHROCYTE [DISTWIDTH] IN BLOOD BY AUTOMATED COUNT: 12.1 % (ref 12.3–15.4)
GLOBULIN SER CALC-MCNC: 3.2 GM/DL
GLUCOSE SERPL-MCNC: 119 MG/DL (ref 65–99)
HBA1C MFR BLD: 5.9 % (ref 4.8–5.6)
HCT VFR BLD AUTO: 36.9 % (ref 34–46.6)
HDLC SERPL-MCNC: 74 MG/DL (ref 40–60)
HGB BLD-MCNC: 12.4 G/DL (ref 12–15.9)
IMM GRANULOCYTES # BLD AUTO: 0.03 10*3/MM3 (ref 0–0.05)
IMM GRANULOCYTES NFR BLD AUTO: 0.4 % (ref 0–0.5)
LDLC SERPL CALC-MCNC: 136 MG/DL (ref 0–100)
LDLC/HDLC SERPL: 1.81 {RATIO}
LYMPHOCYTES # BLD AUTO: 2.71 10*3/MM3 (ref 0.7–3.1)
LYMPHOCYTES NFR BLD AUTO: 34.3 % (ref 19.6–45.3)
MCH RBC QN AUTO: 31.1 PG (ref 26.6–33)
MCHC RBC AUTO-ENTMCNC: 33.6 G/DL (ref 31.5–35.7)
MCV RBC AUTO: 92.5 FL (ref 79–97)
MONOCYTES # BLD AUTO: 0.42 10*3/MM3 (ref 0.1–0.9)
MONOCYTES NFR BLD AUTO: 5.3 % (ref 5–12)
NEUTROPHILS # BLD AUTO: 4.63 10*3/MM3 (ref 1.7–7)
NEUTROPHILS NFR BLD AUTO: 58.7 % (ref 42.7–76)
NRBC BLD AUTO-RTO: 0 /100 WBC (ref 0–0.2)
PLATELET # BLD AUTO: 370 10*3/MM3 (ref 140–450)
POTASSIUM SERPL-SCNC: 4.8 MMOL/L (ref 3.5–5.2)
PROT SERPL-MCNC: 7.2 G/DL (ref 6–8.5)
RBC # BLD AUTO: 3.99 10*6/MM3 (ref 3.77–5.28)
SODIUM SERPL-SCNC: 137 MMOL/L (ref 136–145)
TRIGL SERPL-MCNC: 86 MG/DL (ref 0–150)
VLDLC SERPL CALC-MCNC: 15 MG/DL (ref 5–40)
WBC # BLD AUTO: 7.89 10*3/MM3 (ref 3.4–10.8)

## 2025-07-16 RX ORDER — SITAGLIPTIN AND METFORMIN HYDROCHLORIDE 1000; 50 MG/1; MG/1
1 TABLET, FILM COATED, EXTENDED RELEASE ORAL 2 TIMES DAILY WITH MEALS
Qty: 180 TABLET | Refills: 1 | Status: SHIPPED | OUTPATIENT
Start: 2025-07-16

## 2025-07-16 NOTE — ASSESSMENT & PLAN NOTE
Stable.  Continue Omeprazole daily as needed.  Will try lower dose Omeprazole and see if tolerated.

## 2025-07-16 NOTE — ASSESSMENT & PLAN NOTE
Diabetes is improving with treatment.   Continue current treatment regimen.  Regular aerobic exercise.  Reminded to get yearly retinal exam.  Diabetes will be reassessed 4 months  Continue Janumet twice daily.